# Patient Record
Sex: FEMALE | Race: WHITE
[De-identification: names, ages, dates, MRNs, and addresses within clinical notes are randomized per-mention and may not be internally consistent; named-entity substitution may affect disease eponyms.]

---

## 2017-04-21 ENCOUNTER — HOSPITAL ENCOUNTER (INPATIENT)
Dept: HOSPITAL 68 - ERH | Age: 67
LOS: 3 days | DRG: 812 | End: 2017-04-24
Attending: HOSPITALIST | Admitting: INTERNAL MEDICINE
Payer: COMMERCIAL

## 2017-04-21 VITALS — WEIGHT: 108 LBS | BODY MASS INDEX: 21.2 KG/M2 | HEIGHT: 60 IN

## 2017-04-21 VITALS — DIASTOLIC BLOOD PRESSURE: 62 MMHG | SYSTOLIC BLOOD PRESSURE: 112 MMHG

## 2017-04-21 DIAGNOSIS — Z87.891: ICD-10-CM

## 2017-04-21 DIAGNOSIS — F41.9: ICD-10-CM

## 2017-04-21 DIAGNOSIS — I48.0: ICD-10-CM

## 2017-04-21 DIAGNOSIS — Z85.118: ICD-10-CM

## 2017-04-21 DIAGNOSIS — D64.9: Primary | ICD-10-CM

## 2017-04-21 DIAGNOSIS — E87.5: ICD-10-CM

## 2017-04-21 DIAGNOSIS — I24.8: ICD-10-CM

## 2017-04-21 DIAGNOSIS — E78.5: ICD-10-CM

## 2017-04-21 DIAGNOSIS — N18.4: ICD-10-CM

## 2017-04-21 DIAGNOSIS — K92.2: ICD-10-CM

## 2017-04-21 DIAGNOSIS — I25.10: ICD-10-CM

## 2017-04-21 DIAGNOSIS — I12.9: ICD-10-CM

## 2017-04-21 DIAGNOSIS — I25.2: ICD-10-CM

## 2017-04-21 DIAGNOSIS — J44.9: ICD-10-CM

## 2017-04-21 LAB
ABSOLUTE GRANULOCYTE CT: 6.2 /CUMM (ref 1.4–6.5)
ABSOLUTE GRANULOCYTE CT: 7.3 /CUMM (ref 1.4–6.5)
BASOPHILS # BLD: 0 /CUMM (ref 0–0.2)
BASOPHILS # BLD: 0 /CUMM (ref 0–0.2)
BASOPHILS NFR BLD: 0 % (ref 0–2)
BASOPHILS NFR BLD: 0 % (ref 0–2)
EOSINOPHIL # BLD: 0.2 /CUMM (ref 0–0.7)
EOSINOPHIL # BLD: 0.3 /CUMM (ref 0–0.7)
EOSINOPHIL NFR BLD: 2.4 % (ref 0–5)
EOSINOPHIL NFR BLD: 3.9 % (ref 0–5)
ERYTHROCYTE [DISTWIDTH] IN BLOOD BY AUTOMATED COUNT: 21.5 % (ref 11.5–14.5)
ERYTHROCYTE [DISTWIDTH] IN BLOOD BY AUTOMATED COUNT: 26.4 % (ref 11.5–14.5)
GRANULOCYTES NFR BLD: 81.1 % (ref 42.2–75.2)
GRANULOCYTES NFR BLD: 86.7 % (ref 42.2–75.2)
HCT VFR BLD CALC: 22 % (ref 37–47)
HCT VFR BLD CALC: 32.5 % (ref 37–47)
LYMPHOCYTES # BLD: 0.4 /CUMM (ref 1.2–3.4)
LYMPHOCYTES # BLD: 0.4 /CUMM (ref 1.2–3.4)
MCH RBC QN AUTO: 29.8 PG (ref 27–31)
MCH RBC QN AUTO: 30.4 PG (ref 27–31)
MCHC RBC AUTO-ENTMCNC: 30.7 G/DL (ref 33–37)
MCHC RBC AUTO-ENTMCNC: 32.5 G/DL (ref 33–37)
MCV RBC AUTO: 93.4 FL (ref 81–99)
MCV RBC AUTO: 97 FL (ref 81–99)
MONOCYTES # BLD: 0.5 /CUMM (ref 0.1–0.6)
MONOCYTES # BLD: 0.7 /CUMM (ref 0.1–0.6)
PLATELET # BLD: 217 /CUMM (ref 130–400)
PLATELET # BLD: 242 /CUMM (ref 130–400)
PMV BLD AUTO: 7.7 FL (ref 7.4–10.4)
PMV BLD AUTO: 8.5 FL (ref 7.4–10.4)
RED BLOOD CELL CT: 2.27 /CUMM (ref 4.2–5.4)
RED BLOOD CELL CT: 3.48 /CUMM (ref 4.2–5.4)
WBC # BLD AUTO: 7.6 /CUMM (ref 4.8–10.8)
WBC # BLD AUTO: 8.5 /CUMM (ref 4.8–10.8)

## 2017-04-21 PROCEDURE — P9016 RBC LEUKOCYTES REDUCED: HCPCS

## 2017-04-21 PROCEDURE — 30233N1 TRANSFUSION OF NONAUTOLOGOUS RED BLOOD CELLS INTO PERIPHERAL VEIN, PERCUTANEOUS APPROACH: ICD-10-PCS | Performed by: INTERNAL MEDICINE

## 2017-04-21 NOTE — CONS- CARDIOLOGY
General Information and HPI
 
Consulting Request
Date of Consult: 04/21/17
Requested By:
YAKELIN MARC MD
 
Reason for Consult:
Coronary artery disease
Source of Information: patient, old records
History of Present Illness:
 
This is a pleasant 67 y/o female with a past medical history of CAD with prior 
PCI (2001/2014) and STEMI tx with BEN to the RCA 4/2016, Hx COPD/ILD/lung cancer
s/p gamma knife radiation, PAF not on AC due to hx of GI bleed and anemia, CKD, 
and hypertension who presents to  with a chief complaint of 
progressive shortness of breath, mostly on exertion over the last week.  She 
also developed an episode of midsternal chest pain yesterday with some radiation
to her left arm.  No obvious reproducible component. Denies any orthopnea, 
paroxysmal nocturnal dyspnea, dizziness, or palpitations. She had been having 
some lower extremity edema previously but resolves when her Norvasc was 
discontinued.  She has not noticed any obvious bleeding.  She was found to be 
severely anemic on presentation and was receiving a transfusion during my 
interview with her.  She is currently without continuing chest pain.
 
 
 
Allergies/Medications
Allergies:
Coded Allergies:
No Known Allergies (05/25/16)
 
Home Med List:
Albuterol Sulfate (Proair Respiclick) 90 MCG AER.POW.BA   2 PUFF INH Q8P PRN 
COPD  (Reported)
Alprazolam 0.5 MG TABLET   1 TAB PO BID ANXIETY  (Reported)
Amiodarone HCl 200 MG TABLET   1 TAB PO DAILY AFIB  (Reported)
Amiodarone HCl (Pacerone) 100 MG TABLET   100 MG PO EOD AFIB  (Reported)
Aspirin (Children's Aspirin) 81 MG TAB.CHEW   1 TAB PO DAILY HEART HEALTH  (
Reported)
Cholecalciferol (Vitamin D3) (Vitamin D) 1,000 UNIT TABLET   3 TAB PO DAILY 
SUPPLEMENT  (Reported)
Cholecalciferol (Vitamin D3) (Vitamin D) 1,000 UNIT TABLET   3,000 UNITS PO 
DAILY SUPPLEMENT  (Reported)
Clopidogrel Bisulfate (Plavix) 75 MG TABLET   1 TAB PO DAILY BLOOD THINNER  (
Reported)
Diphenhydramine HCl (Benadryl) 25 MG CAPSULE   1 CAP PO DAILY AS NEEDED ALLERGY 
(Reported)
Ezetimibe (Zetia) 10 MG TAB   1 TAB PO DAILY CHOL  (Reported)
Ezetimibe (Zetia) 10 MG TABLET   1 TAB PO DAILY CHOLESTEROL  (Reported)
Fluticasone/Vilanterol (Breo Ellipta 100-25 Mcg INH) 100 MCG-25 MCG/DOSE 
BLST.W.DEV   1 PUFF INH DAILY COPD  (Reported)
Levothyroxine Sodium (Levo-T) 25 MCG TABLET   25 MCG PO DAILY THYROID  (Reported
)
Megestrol Acetate (Megace) 400 MG/10 ML (40 MG/ML) ORAL.SUSP   20 ML PO DAILY 
apetite
Metoprolol Succ XL (Toprol Xl) 50 MG TAB.ER.24H   1 TAB PO DAILY AFIB  (Reported
)
Mirtazapine 15 MG TABLET   0.5 TAB PO QPM SLEEP  (Reported)
Mometasone/Formoterol (Dulera 200 Mcg/5 Mcg Inhaler) 13 GM HFA.AER.AD   2 PUF 
INH BID COPD  (Reported)
Omeprazole 40 MG CAPSULE.DR   1 CAP PO DAILY gerd  (Reported)
Ranolazine (Ranexa 500MG) 500 MG TAB.ER.12H   1 TAB PO BID ANGINA  (Reported)
Rosuvastatin Calcium (Crestor) 10 MG TABLET   1 TAB PO DAILY CHOL  (Reported)
Sennosides/Docusate Sodium (Senna-Docusate Sodium Tablet) 1 EACH TABLET   1 TAB 
PO BID PRN CONSTIPATION  (Reported)
Tiotropium Bromide (Spiriva) 18 MCG CAP.W.DEV   1 CAP INH DAILY COPD  (Reported)
Umeclidinium Bromide (Incruse Ellipta) 62.5 MCG/ACTUATION BLST.W.DEV   1 PUFF 
INH DAILY COPD  (Reported)
 
Current Medications:
 Current Medications
 
 
  Sig/Enrique Start time  Last
 
Medication Dose Route Stop Time Status Admin
 
Acetaminophen 650 MG Q6-PRN PRN 04/21 0900 AC 
 
  PO   
 
Albuterol Sulfate 2 PUF Q4 04/21 1000 DC 
 
  INH   
 
Albuterol Sulfate 2 PUF Q4P PRN 04/21 0930 AC 
 
  INH   
 
Alprazolam 0.5 MG BID 04/21 1000 AC 04/21
 
  PO 04/28 0959  1001
 
Alprazolam 0 .STK-MED ONE 04/21 0956 DC 
 
  PO   
 
Amiodarone HCl 100 MG Q48 04/21 1000 AC 04/21
 
  PO   1001
 
Atorvastatin Calcium 40 MG 1700 04/21 1700 AC 
 
  PO   
 
Budesonide/ 2 PUF BID 04/21 1000 AC 04/21
 
Formoterol Fumarate  INH   1001
 
Calcium Gluconate 0 .STK-MED ONE 04/21 0731 DC 
 
  IV   
 
Calcium Gluconate 1 GM ONCE ONE 04/21 0715 DC 04/21
 
Sodium Chloride 100 ML IV 04/21 0814  0730
 
Ezetimibe 10 MG DAILY 04/21 1000 AC 04/21
 
  PO   1001
 
Fluticasone  2 PUF BID 04/21 1000 CAN 
 
Propionate  INH   
 
Levothyroxine Sodium 0.025 MG DAILY AC 04/21 0915 AC 04/21
 
  PO   1001
 
Metoprolol Succinate 50 MG DAILY 04/21 1000 AC 04/21
 
  PO   1001
 
Mirtazapine 7.5 MG AT BEDTIME 04/21 2200 AC 
 
  PO   
 
Multivitamins 1 TAB DAILY 04/21 1000 AC 04/21
 
  PO   1001
 
Nitroglycerin 0 .STK-MED ONE 04/21 0638 DC 
 
  SL   
 
Nitroglycerin 0.4 MG ONCE ONE 04/21 0615 DC 04/21
 
  SL 04/21 0616  0637
 
Omeprazole 40 MG DAILY AC 04/21 0901 AC 04/21
 
  PO   1001
 
Ranolazine 500 MG BID 04/21 2200 AC 
 
  PO   
 
Senna 187 MG AT BEDTIME AS NEED.. 04/21 0915 AC 
 
  PO   
 
Sodium Chloride 500 ML BOLUS ONE 04/21 0700 DC 04/21
 
  IV 04/21 0759  0700
 
 
 
 
Review of Systems
Review of Systems:
 
Review of systems as per HPI. The remainder of a 10 point review of systems was 
reviewed and was otherwise negative.
 
Past History
 
Travel History
Traveled to [place] past 21 day No
 
Medical History
Neurological: NONE
EENT: NONE
Cardiovascular: CAD, hypertension, hyperlipidemia, myocardial infarction, STENTS
Respiratory: pneumonia, pulmonary fibrosis, LUNG CA ILD
Gastrointestinal: peptic ulcer disease
Hepatic: NONE
Renal: chronic kidney disease
Musculoskeletal: disk herniation
Psychiatric: NONE, anxiety
Endocrine: NONE
Blood Disorders: anemia
Cancer(s): lung cancer
 
Surgical History
Surgical History: STENTS (5) HYSTERECTOMY,LAMINECTOMY
 
Family History
Relations & Conditions If Any:
MOTHER (Leukemia).
FATHER (Heart Attack).
SISTER (ovarian cancer).
BROTHER (Diabetes).
 
 
Psychosocial History
Where Do You Live? Home
Who Do You Live With? self
Services at Home: Nursing, Physical Therapy
Primary Language: English
Smoking Status: Former Smoker
ETOH Use: denies use
 
Functional Ability
ADLs
Independent: dressing, eating, toileting, bathing. 
Ambulation: cane
IADLs
Independent: shopping, housework, finances, food prep, telephone, transportation
, medication admin. 
 
Employment History
Employment: Retired
Profession/Employer 
 
ECHO Results (as available)
Date of last Echo 05/18/16
EF% 60
 
Exam & Diagnostic Data
Vital Signs and I&O
Vital Signs
 
 
Date Time Temp Pulse Resp B/P B/P Pulse O2 O2 Flow FiO2
 
     Mean Ox Delivery Rate 
 
04/21 1101 97.6 78 16      
 
04/21 1039       Nasal 2.0L 
 
       Cannula  
 
04/21 1001 97.3 84 20 133/70     
 
04/21 1001 97.3 84 20 133/70     
 
04/21 0920 97.0 80 20 113/74  92 Nasal 2.0L 
 
       Cannula  
 
04/21 0908 97.3 84 20 133/70  93 Nasal 2.0L 
 
       Cannula  
 
04/21 0716 96.8 77 18 106/55  95 Nasal 2.0L 
 
       Cannula  
 
04/21 0649    89/52     
 
04/21 0638    108/53     
 
04/21 0559      98 Nasal 2.0L 
 
       Cannula  
 
04/21 0555 96.8 82 18 106/50  98 Nasal 2.0L 
 
       Cannula  
 
 
 Intake & Output
 
 
 04/21 1600 04/21 0800 04/21 0000 04/20 1600 04/20 0800 04/20 0000
 
Intake Total  0    
 
Output Total      
 
Balance  0    
 
       
 
Intake, Oral  0    
 
Patient 108 lb 110 lb    
 
Weight      
 
Weight  Estimated    
 
Measurement      
 
Method      
 
 
 
Physical Exam:
 
General: no apparent distress. Alert.
Eyes: No obvious scleral icterus.
HEENT: No jugular venous distention or abnormal jugular venous pulsations.
Cardiovascular: Normal intensity S1/S2.  Regular.
Respiratory: Lungs clear to auscultation bilaterally.
Abdomen: Soft, nontender with no guarding or rebound tenderness.
Musculoskeletal: No clubbing or cyanosis noted, no edema
Skin: Warm
Neurologic: No gross focal deficits noted.
 
 
 
Labs/Stephon Results:
 Laboratory Tests
 
 
 04/21
 
 0627
 
Chemistry 
 
  Sodium (137 - 145 mmol/L) 142
 
  Potassium (3.5 - 5.1 mmol/L) 5.6  H
 
  Chloride (98 - 107 mmol/L) 110  H
 
  Carbon Dioxide (22 - 30 mmol/L) 20  L
 
  Anion Gap (5 - 16) 12
 
  BUN (7 - 17 mg/dL) 39  H
 
  Creatinine (0.5 - 1.0 mg/dL) 1.7  H
 
  Estimated GFR (>60 ml/min) 30  L
 
  BUN/Creatinine Ratio (7 - 25 %) 22.9
 
  Glucose (65 - 99 mg/dL) 98
 
  Calcium (8.4 - 10.2 mg/dL) 9.3
 
  Magnesium (1.6 - 2.3 mg/dL) 1.9
 
  Total Bilirubin (0.2 - 1.3 mg/dL) 0.5
 
  AST (14 - 36 U/L) 29
 
  ALT (9 - 52 U/L) 33
 
  Alkaline Phosphatase (<127 U/L) 184  H
 
  Troponin I (< 0.11 ng/ml) < 0.01
 
  Pro-B-Natriuretic Pept (<125 pg/mL) 2840  H
 
  Total Protein (6.3 - 8.2 g/dL) 6.0  L
 
  Albumin (3.5 - 5.0 g/dL) 3.5
 
  Globulin (1.9 - 4.2 gm/dL) 2.5
 
  Albumin/Globulin Ratio (1.1 - 2.2 %) 1.4
 
  Free T4 (0.78 - 2.44 ng/dL) 1.43
 
  Total T3 (0.97 - 1.69 ng/mL) 1.13
 
  TSH &T3 &Free T4 Intrp (0.270 - 4.20 uIU/mL) 12.400  H
 
Hematology 
 
  CBC w Diff MAN DIFF ORDERED
 
  WBC (4.8 - 10.8 /CUMM) 8.5
 
  RBC (4.20 - 5.40 /CUMM) 2.27  L
 
  Hgb (12.0 - 16.0 G/DL) 6.8 *L
 
  Hct (37 - 47 %) 22.0  L
 
  MCV (81.0 - 99.0 FL) 97.0
 
  MCH (27.0 - 31.0 PG) 29.8
 
  RDW (11.5 - 14.5 %) 26.4  H
 
  Plt Count (130 - 400 /CUMM) 242
 
  MPV (7.4 - 10.4 FL) 7.7
 
  Gran % (42.2 - 75.2 %) 86.7  H
 
  Lymphocytes % (20.5 - 51.1 %) 4.7  L
 
  Monocytes % (1.7 - 9.3 %) 6.2
 
  Eosinophils % (0 - 5 %) 2.4
 
  Basophils % (0.0 - 2.0 %) 0  L
 
  Absolute Granulocytes (1.4 - 6.5 /CUMM) 7.3  H
 
  Segmented Neutrophils (42.2 - 75.2 %) 90  H
 
  Absolute Lymphocytes (1.2 - 3.4 /CUMM) 0.4  L
 
  Lymphocytes (20.5 - 51.1 %) 3  L
 
  Monocytes (1.7 - 9.3 %) 4
 
  Absolute Monocytes (0.10 - 0.60 /CUMM) 0.5
 
  Eosinophils (0 - 5.0 %) 3
 
  Absolute Eosinophils (0.0 - 0.7 /CUMM) 0.2
 
  Absolute Basophils (0.0 - 0.2 /CUMM) 0
 
  Nucleated RBCs (0.0 - 0.0 /100WBC) 1  H
 
  Platelet Estimate (ADEQUATE) ADEQUATE
 
  Polychromasia 1+
 
  Hypochromic-Microcytic 1+
 
  Poikilocytosis 1+
 
  Anisocytosis 1+
 
  Macrocytic Cells FEW
 
  Ovalocytes 1+
 
  PUBS MCHC (33.0 - 37.0 G/DL) 30.7  L
 
Other Body Source 
 
  Fld Total RBCs Counted (%) 100
 
 
 
 
Diagnostic Data
EKG Results
Tracing was personally reviewed and shows sinus rhythm at 81 bpm with poor R-
wave progression and left axis deviation
CXR Results
There is a small left pleural effusion that appears new when compared to the
most recent prior examination from 12/03/2016. Mixed interstitial and
alveolar opacities within the right lower lobe are stable. No overt
consolidative disease.
 
Assessment/Plan
Assessment/Plan
 
1. Severe symptomatic anemia requiring transfusion with concern for GI bleeding
2. CAD with prior PCI (2001/2014) and recent STEMI; tx with BEN to the RCA 4/
2016
3. Hx COPD/ILD/lung cancer s/p gamma knife radiation (felt not a surgical 
candidate)
4. PAF not on AC due to hx of GI bleed and anemia, on low dose Amiodarone
5. CKD
6. Hyperkalemia
 
I agree that her symptoms may have been ischemic in nature likely precipitated 
by demand ischemia in the setting of the new severe anemia. Agree with obtaining
serial troponins. Check a stool guaiac and obtain GI consult. Would hold the 
aspirin and Plavix for now, will need to be back on aspirin therapy in the 
future but unlikely need to restart Plavix as she is now one year status post 
drug-eluting stent. Continue her antianginal regimen and statin therapy. Monitor
the potassium. Continue patient on telemetry. She remains in sinus rhythm.
 
Benjamin Pacheco MD [facility]
 
 
 
 
 
 
Consult Acknowledgment
- Thank you for your consult request.

## 2017-04-21 NOTE — RADIOLOGY REPORT
EXAMINATION:
XR PORTABLE CHEST
 
CLINICAL INFORMATION:
Chest pain.
 
COMPARISON:
Chest radiograph 12/03/2016.
 
TECHNIQUE:
Portable AP view of the chest was obtained.
 
FINDINGS:
Mixed interstitial and airspace disease within the right lower lobe appear
largely unchanged when compared to most recent prior examination from
12/03/2016. There is a stable position of 4 linear metallic bodies within the
right lower hemithorax. There is a new small left effusion with associated
subsegmental atelectasis of the left lower lobe. No pneumothorax. Coarse
interstitial markings are visualized within the upper lobes. The cardiac
silhouette is unremarkable. Upper mediastinal contours are normal. No acute
osseous finding.
 
IMPRESSION:
There is a small left pleural effusion that appears new when compared to the
most recent prior examination from 12/03/2016. Mixed interstitial and
alveolar opacities within the right lower lobe are stable. No overt
consolidative disease.

## 2017-04-21 NOTE — EVENT NOTE
Event Note
Event Note:
I did a rectal examination at 10am with stool guiac, stool is brown and guiac 
positive.

## 2017-04-21 NOTE — HISTORY & PHYSICAL
BIRGIT NEWBERRY,CULLEN 04/21/17 0909:
General Information and HPI
MD Statement:
I have seen and personally examined CHRIS ELDRIDGE and documented this H&P.
 
The patient is a 66 year old F who presented with a patient stated chief 
complaint of chest pain[].
 
Source of Information: patient, old records
Exam Limitations: no limitations
History of Present Illness:
67 YO F with pmh of STEMI 4/2016 with drug eluding stent placement, h/o total 5 
stents, CAD, PAF, not on anticoagulation due to h/o GI bleed, HTN, HLD, Lung Ca 
s/p cyberknife sugery, COPD (not on home O2), PUD, CKD, Chronic anemia (h/o 
transfusions and epogen) presents to the ER from home after developing chest 
pain yesterday while at [employer] walking through the store. Reports centrally 
located cp that radiated to her left arm, neck, and jaw, 7/10. Reports the pain 
she had last year when she had her MI as more intense. This pain subsided after 
10 minutes of sitting in the store. She drove her self home. Through the night 
she continued to have similar episodes with shortness of breath and decided to 
seek help, called 911. Reports that her pain continued until given nitroglycerin
in ER. Since that time she denies any recurring chest pain. Denies palpitations,
lightheadness, nausea, vomiting, abdominal pain, fever or chills. She is 
chronically constipated, last BM yesterday stool well formed w/o blood. States 
that she can go several days w/o bowel movements, this makes it difficult to 
take iron supplementation. Recently started on epogen.
 
Allergies/Medications
Allergies:
Coded Allergies:
No Known Allergies (05/25/16)
 
Home Med list
Albuterol Sulfate (Proair Respiclick) 90 MCG AER.POW.BA   2 PUFF INH Q8P PRN 
COPD  (Reported)
Alprazolam 0.5 MG TABLET   1 TAB PO BID ANXIETY  (Reported)
Amiodarone HCl 200 MG TABLET   1 TAB PO DAILY AFIB  (Reported)
Amiodarone HCl (Pacerone) 100 MG TABLET   100 MG PO EOD AFIB  (Reported)
Aspirin (Children's Aspirin) 81 MG TAB.CHEW   1 TAB PO DAILY HEART HEALTH  (
Reported)
Cholecalciferol (Vitamin D3) (Vitamin D) 1,000 UNIT TABLET   3 TAB PO DAILY 
SUPPLEMENT  (Reported)
Cholecalciferol (Vitamin D3) (Vitamin D) 1,000 UNIT TABLET   3,000 UNITS PO 
DAILY SUPPLEMENT  (Reported)
Clopidogrel Bisulfate (Plavix) 75 MG TABLET   1 TAB PO DAILY BLOOD THINNER  (
Reported)
Diphenhydramine HCl (Benadryl) 25 MG CAPSULE   1 CAP PO DAILY AS NEEDED ALLERGY 
(Reported)
Ezetimibe (Zetia) 10 MG TAB   1 TAB PO DAILY CHOL  (Reported)
Ezetimibe (Zetia) 10 MG TABLET   1 TAB PO DAILY CHOLESTEROL  (Reported)
Fluticasone/Vilanterol (Breo Ellipta 100-25 Mcg INH) 100 MCG-25 MCG/DOSE 
BLST.W.DEV   1 PUFF INH DAILY COPD  (Reported)
Levothyroxine Sodium (Levo-T) 25 MCG TABLET   25 MCG PO DAILY THYROID  (Reported
)
Megestrol Acetate (Megace) 400 MG/10 ML (40 MG/ML) ORAL.SUSP   20 ML PO DAILY 
apetite
Metoprolol Succ XL (Toprol Xl) 50 MG TAB.ER.24H   1 TAB PO DAILY AFIB  (Reported
)
Mirtazapine 15 MG TABLET   0.5 TAB PO QPM SLEEP  (Reported)
Mometasone/Formoterol (Dulera 200 Mcg/5 Mcg Inhaler) 13 GM HFA.AER.AD   2 PUF 
INH BID COPD  (Reported)
Omeprazole 40 MG CAPSULE.DR   1 CAP PO DAILY gerd  (Reported)
Ranolazine (Ranexa 500MG) 500 MG TAB.ER.12H   1 TAB PO BID ANGINA  (Reported)
Rosuvastatin Calcium (Crestor) 10 MG TABLET   1 TAB PO DAILY CHOL  (Reported)
Sennosides/Docusate Sodium (Senna-Docusate Sodium Tablet) 1 EACH TABLET   1 TAB 
PO BID PRN CONSTIPATION  (Reported)
Tiotropium Bromide (Spiriva) 18 MCG CAP.W.DEV   1 CAP INH DAILY COPD  (Reported)
Umeclidinium Bromide (Incruse Ellipta) 62.5 MCG/ACTUATION BLST.W.DEV   1 PUFF 
INH DAILY COPD  (Reported)
 
Compliance With Home Meds: GOOD
 
Past History
 
Travel History
Traveled to [place] past 21 day No
 
Medical History
Neurological: NONE
EENT: NONE
Cardiovascular: CAD, hypertension, hyperlipidemia, myocardial infarction, STENTS
Respiratory: pneumonia, pulmonary fibrosis, LUNG CA ILD
Gastrointestinal: peptic ulcer disease
Hepatic: NONE
Renal: chronic kidney disease
Musculoskeletal: disk herniation
Psychiatric: NONE, anxiety
Endocrine: NONE
Blood Disorders: anemia
Cancer(s): lung cancer
History of MRSA: No
History of VRE: No
History of CDIFF: No
Pneumonia Vaccine: 10/01/16
Influenza Vaccine: 10/01/16
 
Surgical History
Surgical History: STENTS (5) HYSTERECTOMY,LAMINECTOMY
 
ECHO Results (as available)
Date of last Echo 05/18/16
EF% 60
 
Past Family/Social History
 
Family History
Relations & Conditions if any
MOTHER (Leukemia).
FATHER (Heart Attack).
SISTER (ovarian cancer).
BROTHER (Diabetes).
 
 
Psychosocial History
Where do you live? Home
Who Do You Live With? self
Services at Home: Nursing, Physical Therapy
Primary Language: English
Smoking Status: Former Smoker
ETOH Use: denies use
 
Functional Ability
ADLs
Independent: dressing, eating, toileting, bathing. 
Ambulation: cane
IADLs
Independent: shopping, housework, finances, food prep, telephone, transportation
, medication admin. 
 
Employment History
Employment Retired
Profession/Employer 
 
Review of Systems
 
Review of Systems
Constitutional:
Denies: chills, fever, malaise, weakness. 
Cardiovascular:
Reports: chest pain.  Denies: orthopena, palpitations, peripheral edema, 
syncope. 
Respiratory:
Reports: short of breath.  Denies: cough, orthopnea, sputum production, 
wheezing. 
GI:
Reports: constipation.  Denies: abdominal pain, diarrhea, distention, melena, 
nausea, bloody stool, changes in stool, vomiting. 
Genitourinary:
Reports: no symptoms. 
Musculoskeletal:
Reports: no symptoms. 
Skin:
Reports: no symptoms. 
Neurological/Psychological:
Reports: anxiety. 
Hematologic/Endocrine:
Denies: bleeding. 
All Other Systems: Reviewed and Negative
 
Exam & Diagnostic Data
Last 24 Hrs of Vital Signs/I&O
 Vital Signs
 
 
Date Time Temp Pulse Resp B/P B/P Pulse O2 O2 Flow FiO2
 
     Mean Ox Delivery Rate 
 
04/21 0908 97.3 84 20 133/70  93 Nasal 2.0L 
 
       Cannula  
 
04/21 0716 96.8 77 18 106/55  95 Nasal 2.0L 
 
       Cannula  
 
04/21 0649    89/52     
 
04/21 0638    108/53     
 
04/21 0559      98 Nasal 2.0L 
 
       Cannula  
 
04/21 0555 96.8 82 18 106/50  98 Nasal 2.0L 
 
       Cannula  
 
 
 Intake & Output
 
 
 04/21 1600 04/21 0800 04/21 0000
 
Intake Total  0 
 
Output Total   
 
Balance  0 
 
    
 
Intake, Oral  0 
 
Patient  110 lb 
 
Weight   
 
Weight  Estimated 
 
Measurement   
 
Method   
 
 
 
 
Physical Exam
General Appearance Alert, Oriented X3, Cooperative, No Acute Distress
Skin No Rashes
HEENT Atraumatic, PERRLA, EOMI, Mucous Membr. moist/pink
Neck Supple, No JVD
Cardiovascular Regular Rate, Normal S1, Normal S2
Lungs crackles at bases
Abdomen Normal Bowel Sounds, Soft, No Tenderness
Neurological Normal Speech, Strength at 5/5 X4 Ext
Extremities No Edema, Normal Pulses
Last 24 Hrs of Labs/Stephon:
 Laboratory Tests
 
04/21/17 0627:
Anion Gap 12, Estimated GFR 30  L, BUN/Creatinine Ratio 22.9, Glucose 98, 
Calcium 9.3, Magnesium 1.9, Total Bilirubin 0.5, AST 29, ALT 33, Alkaline 
Phosphatase 184  H, Troponin I < 0.01, Pro-B-Natriuretic Pept 2840  H, Total 
Protein 6.0  L, Albumin 3.5, Globulin 2.5, Albumin/Globulin Ratio 1.4, Free T4 
1.43, Total T3 1.13, TSH &T3 &Free T4 Intrp 12.400  H, CBC w Diff MAN DIFF 
ORDERED, RBC 2.27  L, MCV 97.0, MCH 29.8, RDW 26.4  H, MPV 7.7, Gran % 86.7  H, 
Lymphocytes % 4.7  L, Monocytes % 6.2, Eosinophils % 2.4, Basophils % 0  L, 
Absolute Granulocytes 7.3  H, Segmented Neutrophils 90  H, Absolute Lymphocytes 
0.4  L, Lymphocytes 3  L, Monocytes 4, Absolute Monocytes 0.5, Eosinophils 3, 
Absolute Eosinophils 0.2, Absolute Basophils 0, Nucleated RBCs 1  H, Platelet 
Estimate ADEQUATE, Polychromasia 1+, Hypochromic-Microcytic 1+, Poikilocytosis 1
+, Anisocytosis 1+, Macrocytic Cells FEW, Ovalocytes 1+, PUBS MCHC 30.7  L, Fld 
Total RBCs Counted 100
 
 
Diagnostic Data
EKG Results
SR 81, LAFB
CXR Results
There is a small left pleural effusion that appears new when compared to the
most recent prior examination from 12/03/2016. Mixed interstitial and
alveolar opacities within the right lower lobe are stable. No overt
consolidative disease.
 
Assessment/Plan
Assessment:
67 YO F with pmh of STEMI 4/2016 with drug eluding stent placement, h/o total 5 
stents, CAD, PAF, not on anticoagulation due to h/o GI bleed, HTN, HLD, Lung Ca 
s/p cyberknife sugery, COPD (not on home O2), PUD, CKD, Chronic anemia (h/o 
transfusions and epogen) presents to the ER from home after developing chest 
pain that developed yesterday. She is severely anemic on on arrival with ongoing
chest pain and requiring oxygen. Denies blood in her stools.
 
Admit to Telemetry
 
Appears to be anginal pain to to severe anemia
Recommend blood transfusion to keep HH >8/24
We will guiac her stools, for now will hold asprin and plavix
She will need a GI consultation as 4/10/17 HH 10.5/32.6, unlikely this severe 
anemia due to CKD
Discuss with cardiology regarding plavix use, 1 year post stent placement.
Serial troponins and EKGs
B-blocker and  statin have been started
PAF continue amiodarone 100mg, took last dose on 4/19/17
Hyperkalemia, she has been given calcium gluconate in ER, montior K closely. 
will need to treat if K increases further
CKD appears to be stable
COPD, requiring O2 this is lkely due to chronic anemia, monitor oxygen 
saturations closely, cxr also identifies small pleural effusion, keep O2 
saturations > 92%
DVT ppx ALPS
  
 
As Ranked By This Provider
Problem List:
 1. Anxiety
 
 2. Hyperlipidemia
 
 3. COPD (chronic obstructive pulmonary disease)
 
 4. Chest pain
 
 5. CAD (coronary artery disease)
 
 6. PAF (paroxysmal atrial fibrillation)
 
 7. CKD (chronic kidney disease) stage 4, GFR 15-29 ml/min
 
 8. Hyperkalemia
 
 9. Symptomatic anemia
 
 
Core Measures/Miscellaneous
 
Acute Coronary Syndrome
ACS Diagnosis: No
 
Cerebrovascular Accident
CVA/TIA Diagnosis: No
 
Congestive Heart Failure
CHF Diagnosis: No
 
Venous Thromboembolism
VTE Risk Factors: Age > 40, Cancer/chemo/oth therapy
No Mech VTE prophylaxis d/t: No contraindications
No VTE Pharm Prophylaxis d/t: Active bleeding
VTE Diagnosis: No
VTE Type: NONE
VTE Confirmed by (Test): NONE
 
Severe Sepsis
Severe Sepsis Present: No
 
Septic Shock
Septic Shock Present: No
 
Miscellaneous Documentation
Attending Case Discussed With:
YAKELIN MARC MD
 
Primary Care Physician:
YAKELIN MARC MD
 
Patient sees these Specialists
Gastroeneterology
Cardiology
Pulmonology
Level of Patient Care: Telemetry
 
 
BETI STACK MD 04/21/17 0921:
Attending MD Review Statement
 
Attending Statement
Attending MD Statement: examined this patient, discuss w/resident/PA/NP, agreed 
w/resident/PA/NP, reviewed EMR data (avail)
Attending Assessment/Plan:
66-year-old female with history of COPD, interstitial lung disease, history of 
lung cancer status post treatment, extensive coronary artery disease and 
paroxysmal atrial fibrillation not a candidate for anticoagulation secondary to 
GI bleed and history of chronic kidney disease presenting with exertional angina
while she was shopping yesterday.  Lab demonstrates severe anemia.  Suspect slow
GI bleed.  Patient had a similar presentation in 2014 and at that point EGD and 
push enteroscopy was negative and colonoscopy in 2012 was negative as well.  
Chest pain appears angina likely in the setting of severe anemia.  EKG and 
troponin so far negative.  We will admit the patient to telemetry, guaiac stools
and transfuse for hemoglobin goal above 8. 
 
Plan
Guaiac stools
Transfuse for hemoglobin goal above 8
Hold aspirin and Plavix if guaiac stools were positive
Cycle troponins and EKGs
Cardiology and GI consult
correct hyperkalemia
Continue other home medications
Alps for DVT prophylaxis
DNI/DNR

## 2017-04-21 NOTE — NUR
IV CALCIUM GLUCONATE INSFUSING WITHOUT DIFFICULTY AT THIS TIME. PT REMAINS
AWAKE, ALERT, ORIENTED, NO COMPLAINTS AT THIS TIME.

## 2017-04-21 NOTE — NUR
**CRITICAL TEST RESULTS**
 
[id number] CHRIS ELDRIDGE 66 F
 
TESTS AND RESULTS: HGB 6.8 ; HCT 22.0
 
Results received and read back by:   BEENA VALENCIA
   Results received date and time:   04/21/17 0642
 
The following provider was notified of the results, and read the results back:
                       MD SLADE
       
Notified date and time:  04/21/17 at 0642

## 2017-04-21 NOTE — NUR
PT BIBA FROM HOME C/O SUBSTERNAL L SIDED CP
RADIATING TO R SIDE X A FEW HOURS. DENIES SOB,
DENIES DIAPHORESIS. PT HAS HX 5 STENTS, AND AN MI
1 YEAR AGO.

## 2017-04-21 NOTE — NUR
1ST UNIT LPRBC'S INFUSING WITHOUT DIFFICULTY AT THIS TIME. PT RESTING
COMFORTABLY ON STRETCHER AT THIS TIME, NO COMPLAINTS AT PRESENT.

## 2017-04-21 NOTE — ADMISSION CERTIFICATION
Admission Certification
 
Certification Statement
- As attending physician, I certify that at the time of
- admission, based on clinical presentation, severity of
- symptoms, need for further diagnostic testing and
- therapeutic interventions, and risk of adverse outcomes
- without in-hospital treatment, in my clinical assessment,
- this patient requires an acute hospital stay for a minimum
- of two nights or longer. I have also considered psychsocial
- factors such as support system, advanced age, financial
- issues, cognitive issues, and failed out-patient treatments,
- past re-admission history, safety of patient, and lack of
- compliance as applicable.
Specific rationale supporting this admission is:
Symptomatic anemia
Angina

## 2017-04-21 NOTE — NUR
IV CALCIUM COMPLETED, TOLERATED WELL. MST EDDIE TO LAB FOR BLOOD PRODUCT
PICKUP. PT AMBULATORY TO BATHROOM, GAIT STABLE.

## 2017-04-21 NOTE — ED CARDIAC/CP/PALPITATIONS
History of Present Illness
 
General
Chief Complaint: Chest Pain
Stated Complaint: BIBA, CP
Source: patient, old records, EMS
Exam Limitations: no limitations
 
Vital Signs & Intake/Output
Vital Signs & Intake/Output
 Vital Signs
 
 
Date Time Temp Pulse Resp B/P B/P Pulse O2 O2 Flow FiO2
 
     Mean Ox Delivery Rate 
 
04/21 0716 96.8 77 18 106/55  95 Nasal 2.0L 
 
       Cannula  
 
04/21 0649    89/52     
 
04/21 0638    108/53     
 
04/21 0559      98 Nasal 2.0L 
 
       Cannula  
 
04/21 0555 96.8 82 18 106/50  98 Nasal 2.0L 
 
       Cannula  
 
 
Allergies
Coded Allergies:
No Known Allergies (05/25/16)
 
Reconcile Medications
Albuterol Sulfate (Proair Respiclick) 90 MCG AER.POW.BA   2 PUFF INH Q8P PRN 
COPD  (Reported)
Alprazolam 0.5 MG TABLET   1 TAB PO BID ANXIETY  (Reported)
Amiodarone HCl 200 MG TABLET   1 TAB PO DAILY AFIB  (Reported)
Aspirin (Children's Aspirin) 81 MG TAB.CHEW   1 TAB PO DAILY HEART HEALTH  (
Reported)
Cholecalciferol (Vitamin D3) (Vitamin D) 1,000 UNIT TABLET   3 TAB PO DAILY 
SUPPLEMENT  (Reported)
Clopidogrel Bisulfate (Plavix) 75 MG TABLET   1 TAB PO DAILY BLOOD THINNER  (
Reported)
Diphenhydramine HCl (Benadryl) 25 MG CAPSULE   1 CAP PO DAILY AS NEEDED ALLERGY 
(Reported)
Ezetimibe (Zetia) 10 MG TAB   1 TAB PO DAILY CHOL  (Reported)
Megestrol Acetate (Megace) 400 MG/10 ML (40 MG/ML) ORAL.SUSP   20 ML PO DAILY 
apetite
Metoprolol Succ XL (Toprol Xl) 50 MG TAB.ER.24H   1 TAB PO DAILY AFIB  (Reported
)
Mirtazapine 15 MG TABLET   0.5 TAB PO QPM SLEEP  (Reported)
Mometasone/Formoterol (Dulera 200 Mcg/5 Mcg Inhaler) 13 GM HFA.AER.AD   2 PUF 
INH BID COPD  (Reported)
Omeprazole 40 MG CAPSULE.DR   1 CAP PO DAILY gerd  (Reported)
Ranolazine (Ranexa 500MG) 500 MG TAB.ER.12H   1 TAB PO BID ANGINA  (Reported)
Rosuvastatin Calcium (Crestor) 10 MG TABLET   1 TAB PO DAILY CHOL  (Reported)
Sennosides/Docusate Sodium (Senna-Docusate Sodium Tablet) 1 EACH TABLET   1 TAB 
PO BID PRN CONSTIPATION  (Reported)
Tiotropium Bromide (Spiriva) 18 MCG CAP.W.DEV   1 CAP INH DAILY COPD  (Reported)
 
Core Measure Meds Pre-Hospital aspirin
Triage Note:
PT BIBA FROM HOME C/O SUBSTERNAL L SIDED CP
RADIATING TO R SIDE X A FEW HOURS. DENIES SOB,
DENIES DIAPHORESIS. PT HAS HX 5 STENTS, AND AN MI
1 YEAR AGO.
Triage Nurses Notes Reviewed? yes
Onset: 1 day
Duration: hour(s):, constant, continues in ED
Timing: recent history
Quality/Severity: moderate, pressure
Location: substernal
Radiation: shoulders, arms
Activities at Onset: activity
Prior Chest Pain/Card Workup: cardiac cath, echocardiography, heart attack
Modifying Factors:
Improves With: rest.  Worsens With: exercise, movement. 
Nitro Today/Relief: 0.4 mg x 1, provided by ED
Aspirin Today: 81 mg x 2, provided at home
Associated Symptoms: dizziness, fatigue, shortness of breath, weakness
LMP (ages 10-50): post menopausal
Pregnant: No
Patient currently breastfeeds: No
HPI:
1 day prior to admission while walking to the store patient became fatigued with
shortness of breath chest pain described as pressure mild to moderate rating to 
shoulder and arm improved with rest.  Chest pain waxing and waning throughout 
the course of the day and she woke with it with continued fatigue and called 
911.
She denies fever chills nausea vomiting diarrhea abdominal pain headache dysuria
rash bleeding.  She has a chronic productive cough that has not changed.
 
Past History
 
Travel History
Traveled to [place] past 21 day No
 
Medical History
Any Pertinent Medical History? see below for history
Neurological: NONE
EENT: NONE
Cardiovascular: CAD, hypertension, hyperlipidemia, myocardial infarction, STENTS
Respiratory: pneumonia, pulmonary fibrosis, LUNG CA ILD
Gastrointestinal: peptic ulcer disease
Hepatic: NONE
Renal: chronic kidney disease
Musculoskeletal: disk herniation
Psychiatric: NONE, anxiety
Endocrine: NONE
Blood Disorders: anemia
Cancer(s): lung cancer
History of MRSA: No
History of VRE: No
History of CDIFF: No
Pneumonia Vaccine: 10/01/16
Influenza Vaccine: 10/01/16
 
Surgical History
Surgical History: STENTS (2) HYSTERECTOMY,LAMINECTOMY
 
Psychosocial History
Who do you live with Patient/Self
Services at Home Nursing, Physical Therapy
What is your primary language English
Tobacco Use: Never used
ETOH Use: denies use
 
Family History
Family History, If Any:
MOTHER (Leukemia).
FATHER (Heart Attack).
SISTER (ovarian cancer).
BROTHER (Diabetes).
 
Hx Contributory? No
 
Review of Systems
 
Review of Systems
Constitutional:
Reports: see HPI, malaise, weakness. 
EENTM:
Reports: no symptoms. 
Respiratory:
Reports: see HPI, short of breath. 
Cardiovascular:
Reports: see HPI, chest pain, peripheral edema. 
GI:
Reports: no symptoms. 
Genitourinary:
Reports: no symptoms. 
Musculoskeletal:
Reports: no symptoms. 
Skin:
Reports: no symptoms. 
Neurological/Psychological:
Reports: no symptoms. 
Hematologic/Endocrine:
Reports: no symptoms. 
Immunologic/Allergic:
Reports: no symptoms. 
All Other Systems: Reviewed and Negative
 
Physical Exam
 
Physical Exam
General Appearance: well developed/nourished, alert, awake, anxious, moderate 
distress
Head: atraumatic, normal appearance
Eyes:
Bilateral: normal appearance, PERRL, EOMI. 
Ears, Nose, Throat: normal pharynx, normal ENT inspection
Neck: normal inspection, supple, full range of motion, no midline tenderness
Respiratory: chest non-tender, no respiratory distress, quiet respiration, 
decreased breath sounds, rhonchi
Cardiovascular: regular rate/rhythm, normal peripheral pulses, norml femoral 
pulses equa
Peripheral Pulses:
4+ carotid (R), 4+ carotid (L)
Gastrointestinal: normal bowel sounds, soft, non-tender, no organomegaly
Rectal: heme negative stool
Back: normal inspection, normal range of motion
Extremities: normal inspection, normal capillary refill, normal range of motion,
pedal edema
Neurologic/Psych: no motor/sensory deficits, awake, alert, oriented x 3, normal 
mood/affect
Reflexes:
2+: bicep (R), bicep (L). 
Skin: intact, warm/dry, pallor
Lymphatic: no anterior cervical brent
 
Core Measures
ACS in differential dx? Yes
ASA ordered for poss ACS? No-d/t bleeding/risk of
Severe Sepsis Present: No
Septic Shock Present: No
 
Progress
Differential Diagnosis: AMI, CHF/pulm edema, hyperkalemia, pneumonia
Plan of Care:
 Orders
 
 
Procedure Date/time Status
 
Regular Diet 04/21 B Active
 
LEUKOCYTE POOR (PACKED CELLS) 04/21 0716 Active
 
OXYGEN SETUP (GEN) 04/21 0654 Active
 
Saline Lock 04/21 0654 Active
 
Admit to inpatient 04/21 0654 Active
 
Vital Signs 04/21 0654 Active
 
Activity/Ambulation 04/21 0654 Active
 
Code Status 04/21 0654 Active
 
Add-on Test (ER Only) 04/21 0652 Active
 
TYPE & SCREEN (NOT X-MATCH) 04/21 0627 Active
 
TSH REFLEX 04/21 0611 Active
 
TROPONIN LEVEL 04/21 0611 Active
 
MAGNESIUM 04/21 0611 Active
 
COMPREHENSIVE METABOLIC PANEL 04/21 0611 Active
 
CBC WITHOUT DIFFERENTIAL 04/21 0611 Complete
 
B-TYPE NATRIURETIC PEP (BNP) 04/21 0611 Active
 
EKG 04/21 0545 Active
 
 
 Current Medications
 
 
  Sig/Enrique Start time  Last
 
Medication Dose  Stop Time Status Admin
 
Calcium Gluconate 1 GM ONCE ONE 04/21 0715 UNVr 
 
(Calcium Gluconate)   04/21 0814  
 
Sodium Chloride 100 ML    
 
(Normal Saline 0.9%)     
 
Sodium Chloride 500 ML BOLUS ONE 04/21 0700 AC 04/21
 
(Normal Saline 0.9%)   04/21 0759  0700
 
 
 Laboratory Tests
 
 
 
04/21/17 0627:
Anion Gap 12, Estimated GFR 30  L, BUN/Creatinine Ratio 22.9, Glucose 98, 
Calcium 9.3, Magnesium 1.9, Total Bilirubin 0.5, AST 29, ALT 33, Alkaline 
Phosphatase 184  H, Troponin I < 0.01, Pro-B-Natriuretic Pept 2840  H, Total 
Protein 6.0  L, Albumin 3.5, Globulin 2.5, Albumin/Globulin Ratio 1.4, TSH &T3 &
Free T4 Intrp Pending, CBC w Diff MAN DIFF ORDERED, RBC 2.27  L, MCV 97.0, MCH 
29.8, RDW 26.4  H, MPV 7.7, Gran % 86.7  H, Lymphocytes % 4.7  L, Monocytes % 
6.2, Eosinophils % 2.4, Basophils % 0  L, Absolute Granulocytes 7.3  H, 
Segmented Neutrophils 90  H, Absolute Lymphocytes 0.4  L, Lymphocytes 3  L, 
Monocytes 4, Absolute Monocytes 0.5, Eosinophils 3, Absolute Eosinophils 0.2, 
Absolute Basophils 0, Nucleated RBCs 1  H, Platelet Estimate ADEQUATE, 
Polychromasia 1+, Hypochromic-Microcytic 1+, Poikilocytosis 1+, Anisocytosis 1+,
Macrocytic Cells FEW, Ovalocytes 1+, PUBS MCHC 30.7  L, Fld Total RBCs Counted 
100
Diagnostic Imaging:
Viewed by Me: Radiology Read.  Discussed w/RAD: Radiology Read. 
CXR Impression: There is a small left pleural effusion that appears new when 
compared to the most recent prior examination from 12/03/2016. Mixed 
interstitial and alveolar opacities within the right lower lobe are stable. No 
overt consolidative disease.
Initial ED EKG: normal axis, normal intervals, normal p-waves, normal QRS 
complex, normal sinus rhythm, nonspecific ST T wave chg
Prior EKG: unchanged
Rhythm Strip: normal sinus rhythm
 
Departure
 
Departure
Time of Disposition: 0713
Disposition: STILL A PATIENT
Condition: Stable
Clinical Impression
Primary Impression: Symptomatic anemia
Secondary Impressions: Acute hyperkalemia, Chest pain syndrome, Chronic renal 
insufficiency
Referrals:
YAKELIN MARC MD (PCP/Family)
 
Departure Forms:
Customer Survey
General Discharge Information
 
Admission Note
Spoke With:
YAKELIN MARC MD
Documentation of Exam:
Documentation of any treatments & extenuating circumstances including Concerns 
Regarding Discharge (functional status, medication knowledge or non-compliance, 
living conditions, etc.) that warrant an admission rather than observation: 
Cardiac monitoring blood transfusion serial lab exam serial EKG medication 
adjustment cardiology evaluation continuing care discharge planning
 
 
Critical Care Note
 
Critical Care Note
Critical Care Time: 30-74 min (40)

## 2017-04-22 VITALS — SYSTOLIC BLOOD PRESSURE: 90 MMHG | DIASTOLIC BLOOD PRESSURE: 40 MMHG

## 2017-04-22 VITALS — SYSTOLIC BLOOD PRESSURE: 120 MMHG | DIASTOLIC BLOOD PRESSURE: 60 MMHG

## 2017-04-22 VITALS — DIASTOLIC BLOOD PRESSURE: 60 MMHG | SYSTOLIC BLOOD PRESSURE: 110 MMHG

## 2017-04-22 LAB
ABSOLUTE GRANULOCYTE CT: 7.8 /CUMM (ref 1.4–6.5)
BASOPHILS # BLD: 0 /CUMM (ref 0–0.2)
BASOPHILS NFR BLD: 0 % (ref 0–2)
EOSINOPHIL # BLD: 0.2 /CUMM (ref 0–0.7)
EOSINOPHIL NFR BLD: 2.2 % (ref 0–5)
ERYTHROCYTE [DISTWIDTH] IN BLOOD BY AUTOMATED COUNT: 21.3 % (ref 11.5–14.5)
GRANULOCYTES NFR BLD: 83.5 % (ref 42.2–75.2)
HCT VFR BLD CALC: 31 % (ref 37–47)
LYMPHOCYTES # BLD: 0.6 /CUMM (ref 1.2–3.4)
MCH RBC QN AUTO: 30 PG (ref 27–31)
MCHC RBC AUTO-ENTMCNC: 32.2 G/DL (ref 33–37)
MCV RBC AUTO: 93.2 FL (ref 81–99)
MONOCYTES # BLD: 0.7 /CUMM (ref 0.1–0.6)
PLATELET # BLD: 218 /CUMM (ref 130–400)
PMV BLD AUTO: 8.3 FL (ref 7.4–10.4)
RED BLOOD CELL CT: 3.32 /CUMM (ref 4.2–5.4)
WBC # BLD AUTO: 9.3 /CUMM (ref 4.8–10.8)

## 2017-04-22 NOTE — PN- HOUSESTAFF
Subjective
Follow-up For:
chest pain
Complaints: no complaints
Tele-Events Since Last Visit:
nsr, hr 84-97, no event
Subjective:
patient followed up today, she offers no complaints, vital signs have been 
stable, no overnight event
 
received two units of prbc yesterday
 
Review of Systems
Constitutional:
Reports: no symptoms. 
 
Objective
Last 24 Hrs of Vital Signs/I&O
 Vital Signs
 
 
Date Time Temp Pulse Resp B/P B/P Pulse O2 O2 Flow FiO2
 
     Mean Ox Delivery Rate 
 
 2131  88  108/50     
 
 1637 98.9 85 18 90/40  92 Nasal 2.5L 
 
       Cannula  
 
 0932  83  110/60     
 
 0932  83  110/60     
 
 0800      95 Nasal 2.0L 
 
       Cannula  
 
 0730 98.9 83 16 110/60  91 Nasal 2.5L 
 
       Cannula  
 
 0019 99.5 88 20 120/60  91 Nasal 2.5L 
 
       Cannula  
 
 0000      91 Nasal 2.5L 
 
       Cannula  
 
 
 Intake & Output
 
 
  1600  0800  0000
 
Intake Total 850 0 120
 
Output Total 700 300 2
 
Balance 150 -300 118
 
    
 
Intake, IV   0
 
Intake, Oral 850 0 120
 
Number 0 0 0
 
Bowel   
 
Movements   
 
Output, Urine 700 300 2
 
 
 
 
Physical Exam
General Appearance: Alert, Oriented X3, Cooperative, No Acute Distress
Other Physical Findings:
Skin No Rashes
HEENT Atraumatic, PERRLA, EOMI, Mucous Membr. moist/pink
Neck Supple, No JVD
Cardiovascular Regular Rate, Normal S1, Normal S2
Lungs crackles at bases
Abdomen Normal Bowel Sounds, Soft, No Tenderness
Neurological Normal Speech, Strength at 5/5 X4 Ext
Extremities No Edema, Normal Pulses
 
Current Medications:
 Current Medications
 
 
  Sig/Enrique Start time  Last
 
Medication Dose Route Stop Time Status Admin
 
Acetaminophen 650 MG Q6-PRN PRN  0900 AC 
 
  PO   
 
Albuterol Sulfate 2 PUF Q4P PRN  0930 AC 
 
  INH   
 
Alprazolam 0.5 MG BID  1000 AC 
 
  PO  0959  2130
 
Amiodarone HCl 100 MG Q48  1000 AC 
 
  PO   1001
 
Aspirin 81 MG DAILY  1521 AC 
 
  PO   1655
 
Atorvastatin Calcium 40 MG 1700  1700 AC 
 
  PO   1655
 
Budesonide/ 2 PUF BID  1000 AC 
 
Formoterol Fumarate  INH   2131
 
Diphenhydramine HCl 50 MG ONCE ONE 2030 DC 
 
  PO 2031  213
 
Diphenhydramine HCl 50 MG ONCE ONE  2315 DC 
 
  PO 2316  2303
 
Diphenhydramine HCl 50 MG ONCE ONE  2245 DC 
 
  PO  2246  
 
Ezetimibe 10 MG DAILY  1000 AC 
 
  PO   0932
 
Levothyroxine Sodium 0.025 MG DAILY AC  0915 AC 
 
  PO   0614
 
Metoprolol Succinate 50 MG DAILY  1000 AC 
 
  PO   0932
 
Mirtazapine 7.5 MG AT BEDTIME  2200 AC 
 
  PO   2131
 
Multivitamins 1 TAB DAILY  1000 AC 
 
  PO   0932
 
Omeprazole 40 MG 1/2H B/BREAKF/DINNER  1630 AC 
 
  PO   1655
 
Omeprazole 40 MG DAILY AC  0901 DC 
 
  PO   0614
 
Patient Medication  1 UNIT ONE NR  1530 NC 
 
Teaching  ED  1600  
 
Ranolazine 500 MG BID  2200 AC 
 
  PO   2131
 
Senna 187 MG AT BEDTIME AS NEED..  0915 AC 
 
  PO   
 
 
 
 
Last 24 Hrs of Lab/Stephon Results
Last 24 Hrs of Labs/Mics:
 Laboratory Tests
 
17 1552:
Anion Gap 14, Estimated GFR 35  L, BUN/Creatinine Ratio 19.3, CBC w Diff NO MAN 
DIFF REQ, RBC 3.32  L, MCV 93.2, MCH 30.0, RDW 21.3  H, MPV 8.3, Gran % 83.5  H,
Lymphocytes % 6.5  L, Monocytes % 7.8, Eosinophils % 2.2, Basophils % 0  L, 
Absolute Granulocytes 7.8  H, Absolute Lymphocytes 0.6  L, Absolute Monocytes 
0.7  H, Absolute Eosinophils 0.2, Absolute Basophils 0, PUBS MCHC 32.2  L
 
 
Assessment/Plan
Assessment:
67 YO F with pmh of STEMI 2016 with drug eluding stent placement, h/o total 5 
stents, CAD, PAF, not on anticoagulation due to h/o GI bleed, HTN, HLD, Lung Ca 
s/p cyberknife sugery, COPD (not on home O2), PUD, CKD, Chronic anemia (h/o 
transfusions and epogen) presents to the ER from home after developing chest 
pain that developed one day POA.
 
Currently being managed in the telemetry floor for the following issues:
 
#Angina, 2/2 anemia
Given her cardiac history with hb 6.8, angia is best explained due to anemia
Guiac was positive yesterday, awaiting GI consultation, will hold ASA and plavix
for now.
Per cardiology, does not need to be on dual antiplatelet agent even after this 
episode, only ASA would be sufficient.
received two units of PRBC yesterday, repeat hb was 10.6 yesterday evening
no symptoms currently
will follow cbc tomorrow as well
 
#PAF not on AC due to risk of bleeding
 
#h/o CKD
 
#hyperkalemia
potassium was 5.6 yesterday, wasn't followed so have sent BEP. awaiting results,
might need keyaxate or other agents if still high, in the setting of cardiac 
history
 
#h/o COPD/lung cancer
not on exacerbation
trc with the goal to maintain SpO2 >90%
continue O2
 
Diet-  heart healthy
DVT ppx- ALPS only
Code status- DNR/DNI
Problem List:
 1. Chest pain syndrome
 
 2. Symptomatic anemia
 
 3. Hyperkalemia
 
 4. PAF (paroxysmal atrial fibrillation)
 
 5. CAD (coronary artery disease)
 
 6. Chronic renal insufficiency
 
 7. COPD (chronic obstructive pulmonary disease)
 
 8. History of lung cancer
 
Pain Ratin
Pain Location:
-
Pain Goal: Pain 4 or less
Pain Plan:
prn as ordered
Tomorrow's Labs & Rationales:
CBC, BEP
 
CBC, BEP

## 2017-04-22 NOTE — PN- ATT ADDEND
Attending Addendum
Attending Brief Note
Covering attending note.
Patient is feeling a lot better since received 2 units of packed red blood 
cells.  Denies any chest pain or any shortness of breath.
 
 Intake & Output
 
 
 04/22 1600 04/22 0800 04/22 0000
 
Intake Total  0 120
 
Output Total  300 2
 
Balance  -300 118
 
    
 
Intake, IV   0
 
Intake, Oral  0 120
 
Number  0 0
 
Bowel   
 
Movements   
 
Output, Urine  300 2
 
 
 Vital Signs
 
 
Date Time Temp Pulse Resp B/P B/P Pulse O2 O2 Flow FiO2
 
     Mean Ox Delivery Rate 
 
04/22 0730 98.9 83 16 110/60  91 Nasal 2.5L 
 
       Cannula  
 
04/22 0019 99.5 88 20 120/60  91 Nasal 2.5L 
 
       Cannula  
 
04/22 0000      91 Nasal 2.5L 
 
       Cannula  
 
04/21 2228  84  112/60     
 
04/21 1550 98.3 80 18 112/62  90 Nasal 2.5L 
 
       Cannula  
 
04/21 1101 97.6 78 16      
 
04/21 1039       Nasal 2.0L 
 
       Cannula  
 
04/21 1001 97.3 84 20 133/70     
 
04/21 1001 97.3 84 20 133/70     
 
04/21 0920 97.0 80 20 113/74  92 Nasal 2.0L 
 
       Cannula  
 
04/21 0908 97.3 84 20 133/70  93 Nasal 2.0L 
 
       Cannula  
 
 
 Intake & Output
 
 
 04/22 1600 04/22 0800 04/22 0000
 
Intake Total  0 120
 
Output Total  300 2
 
Balance  -300 118
 
    
 
Intake, IV   0
 
Intake, Oral  0 120
 
Number  0 0
 
Bowel   
 
Movements   
 
Output, Urine  300 2
 
 
On examination
Patient is awake alert oriented 3.
Conjunctivae is pale sclera is anicteric
Mucous membrane is dry
Neck is supple
S1-S2 is normal
Lungs air entry equal bilaterally no crepitations or rhonchi
Abdomen is soft nontender bowel sounds are present.
 
 
Stool is OB positive. Laboratory Tests
 
 
 04/21 04/21
 
 1856 1345
 
Chemistry  
 
  Iron (37 - 170 ug/dL)  57
 
  TIBC (265 - 497 ug/dL)  411
 
  Ferritin (11.1 - 264 ng/mL)  41.4
 
  Troponin I (< 0.11 ng/ml) 0.02 0.02
 
Hematology  
 
  CBC w Diff NO MAN DIFF REQ 
 
  WBC (4.8 - 10.8 /CUMM) 7.6 
 
  RBC (4.20 - 5.40 /CUMM) 3.48  L 
 
  Hgb (12.0 - 16.0 G/DL) 10.6  L 
 
  Hct (37 - 47 %) 32.5  L 
 
  MCV (81.0 - 99.0 FL) 93.4 
 
  MCH (27.0 - 31.0 PG) 30.4 
 
  RDW (11.5 - 14.5 %) 21.5  H 
 
  Plt Count (130 - 400 /CUMM) 217 
 
  MPV (7.4 - 10.4 FL) 8.5 
 
  Gran % (42.2 - 75.2 %) 81.1  H 
 
  Lymphocytes % (20.5 - 51.1 %) 5.5  L 
 
  Monocytes % (1.7 - 9.3 %) 9.5  H 
 
  Eosinophils % (0 - 5 %) 3.9 
 
  Basophils % (0.0 - 2.0 %) 0  L 
 
  Absolute Granulocytes (1.4 - 6.5 /CUMM) 6.2 
 
  Absolute Lymphocytes (1.2 - 3.4 /CUMM) 0.4  L 
 
  Absolute Monocytes (0.10 - 0.60 /CUMM) 0.7  H 
 
  Absolute Eosinophils (0.0 - 0.7 /CUMM) 0.3 
 
  Absolute Basophils (0.0 - 0.2 /CUMM) 0 
 
  PUBS MCHC (33.0 - 37.0 G/DL) 32.5  L 
 
 
Stool is OB positive
 
Assessment.
1. Severe symptomatic anemia requiring transfusion with concern for GI bleeding
2. CAD with prior PCI (2001/2014) and recent STEMI; tx with BEN to the RCA 4/
2016
3. Hx COPD/ILD/lung cancer s/p gamma knife radiation (felt not a surgical 
candidate)
4. PAF not on AC due to hx of GI bleed and anemia, on low dose Amiodarone
5. CKD
6. Hyperkalemia
 
Plan is continue with current management get GI consult possibilities endoscopy.

## 2017-04-22 NOTE — PN- CARDIOLOGY
Subjective
Subjective:
The patient is awake, alert
Overall feels improved
The events of the last 24 hours as well as telemetry were reviewed.
 
Review of Systems:
The review of systems is negative for chest pains, palpitations nor 
lightheadedness.
The remainder of the 14 point review of systems is noncontributory with the 
exception of above.
 
Objective
Vital Signs and I&Os
Vital Signs
 
 
Date Time Temp Pulse Resp B/P B/P Pulse O2 O2 Flow FiO2
 
     Mean Ox Delivery Rate 
 
04/22 0932  83  110/60     
 
04/22 0932  83  110/60     
 
04/22 0730 98.9 83 16 110/60  91 Nasal 2.5L 
 
       Cannula  
 
04/22 0019 99.5 88 20 120/60  91 Nasal 2.5L 
 
       Cannula  
 
04/22 0000      91 Nasal 2.5L 
 
       Cannula  
 
04/21 2228  84  112/60     
 
04/21 1550 98.3 80 18 112/62  90 Nasal 2.5L 
 
       Cannula  
 
 
 Intake & Output
 
 
 04/22 1600 04/22 0800 04/22 0000 04/21 1600 04/21 0800 04/21 0000
 
Intake Total  0 120 400 0 
 
Output Total  300 2 300  
 
Balance  -300 118 100 0 
 
       
 
Intake, IV   0   
 
Intake, Oral  0 120 400 0 
 
Number  0 0   
 
Bowel      
 
Movements      
 
Output, Urine  300 2 300  
 
Patient    108 lb 110 lb 
 
Weight      
 
Weight     Estimated 
 
Measurement      
 
Method      
 
 
 
Physical Exam:
General: Nontoxic, no apparent distress.
HEENT: Sclera and conjunctiva within normal limits, without xanthelasmas.
Neck: Carotids 2+ without bruits.
Respiratory: Clear to auscultation, air movement is good, without accessory 
respiratory muscle use.
Heart: Regular rate and rhythm, without murmurs, without JVD.
Abdomen: Soft, nontender, no masses, normoactive bowel sounds.
Extremities: Without clubbing, cyanosis, without edema.
Neuro: Nonfocal exam, strength, 5 out of 5
Skin: Within normal limits without lesions.
Psych: Mood and affect: Normal
Current Medications:
 Current Medications
 
 
  Sig/Enrique Start time  Last
 
Medication Dose Route Stop Time Status Admin
 
Acetaminophen 650 MG Q6-PRN PRN 04/21 0900 AC 
 
  PO   
 
Albuterol Sulfate 2 PUF Q4P PRN 04/21 0930 AC 
 
  INH   
 
Alprazolam 0.5 MG BID 04/21 1000 AC 04/22
 
  PO 04/28 0959  0932
 
Amiodarone HCl 100 MG Q48 04/21 1000 AC 04/21
 
  PO   1001
 
Atorvastatin Calcium 40 MG 1700 04/21 1700 AC 04/21
 
  PO   1649
 
Budesonide/ 2 PUF BID 04/21 1000 AC 04/22
 
Formoterol Fumarate  INH   0932
 
Diphenhydramine HCl 50 MG ONCE ONE 04/21 2315 DC 04/21
 
  PO 04/21 2316  2303
 
Diphenhydramine HCl 50 MG ONCE ONE 04/21 2245 DC 
 
  PO 04/21 2246  
 
Ezetimibe 10 MG DAILY 04/21 1000 AC 04/22
 
  PO   0932
 
Levothyroxine Sodium 0.025 MG DAILY AC 04/21 0915 AC 04/22
 
  PO   0614
 
Metoprolol Succinate 50 MG DAILY 04/21 1000 AC 04/22
 
  PO   0932
 
Mirtazapine 7.5 MG AT BEDTIME 04/21 2200 AC 04/21
 
  PO   2228
 
Multivitamins 1 TAB DAILY 04/21 1000 AC 04/22
 
  PO   0932
 
Omeprazole 40 MG DAILY AC 04/21 0901 AC 04/22
 
  PO   0614
 
Ranolazine 500 MG BID 04/21 2200 AC 04/22
 
  PO   0932
 
Senna 187 MG AT BEDTIME AS NEED.. 04/21 0915 AC 
 
  PO   
 
 
 
 
Results
Last 48 Hrs of Labs/Mics:
 Laboratory Tests
 
04/21/17 1856:
Troponin I 0.02, CBC w Diff NO MAN DIFF REQ, RBC 3.48  L, MCV 93.4, MCH 30.4, 
RDW 21.5  H, MPV 8.5, Gran % 81.1  H, Lymphocytes % 5.5  L, Monocytes % 9.5  H, 
Eosinophils % 3.9, Basophils % 0  L, Absolute Granulocytes 6.2, Absolute 
Lymphocytes 0.4  L, Absolute Monocytes 0.7  H, Absolute Eosinophils 0.3, 
Absolute Basophils 0, PUBS MCHC 32.5  L
 
04/21/17 1345:
Iron 57, TIBC 411, Ferritin 41.4, Troponin I 0.02
 
04/21/17 0627:
Anion Gap 12, Estimated GFR 30  L, BUN/Creatinine Ratio 22.9, Glucose 98, 
Calcium 9.3, Magnesium 1.9, Total Bilirubin 0.5, AST 29, ALT 33, Alkaline 
Phosphatase 184  H, Troponin I < 0.01, Pro-B-Natriuretic Pept 2840  H, Total 
Protein 6.0  L, Albumin 3.5, Globulin 2.5, Albumin/Globulin Ratio 1.4, Free T4 
1.43, Total T3 1.13, TSH &T3 &Free T4 Intrp 12.400  H, CBC w Diff MAN DIFF 
ORDERED, RBC 2.27  L, MCV 97.0, MCH 29.8, RDW 26.4  H, MPV 7.7, Gran % 86.7  H, 
Lymphocytes % 4.7  L, Monocytes % 6.2, Eosinophils % 2.4, Basophils % 0  L, 
Absolute Granulocytes 7.3  H, Segmented Neutrophils 90  H, Absolute Lymphocytes 
0.4  L, Lymphocytes 3  L, Monocytes 4, Absolute Monocytes 0.5, Eosinophils 3, 
Absolute Eosinophils 0.2, Absolute Basophils 0, Nucleated RBCs 1  H, Platelet 
Estimate ADEQUATE, Polychromasia 1+, Hypochromic-Microcytic 1+, Poikilocytosis 1
+, Anisocytosis 1+, Macrocytic Cells FEW, Ovalocytes 1+, PUBS MCHC 30.7  L, Fld 
Total RBCs Counted 100
 
 
Assessment/Plan
Assessment/Plan
1. Severe symptomatic anemia requiring transfusion with concern for GI bleeding
2. CAD with prior PCI (2001/2014) and recent STEMI; tx with BEN to the RCA 4/
2016
3. Hx COPD/ILD/lung cancer s/p gamma knife radiation (felt not a surgical 
candidate)
4. PAF not on AC due to hx of GI bleed and anemia, on low dose Amiodarone
5. CKD
6. Hyperkalemia
 
Guaiac positive testing last night was noted.
 
We will continue the patient off aspirin as well as dual antiplatelet therapy.  
Given the timing of her prior stent, she will not need to resume dual 
antiplatelet therapy; however, if deemed aspirin should be restarted when 
approved by GI.
 
Continue the remainder the current medication regimen
Continue telemetry? Yes

## 2017-04-22 NOTE — CONS- GASTROENTEROLOGY
General Information and HPI
 
Consulting Request
Date of Consult: 04/22/17
Requested By:
YAKELIN MARC MD
 
Reason for Consult:
Iron deficiency anemia
Occult GI bleeding
Source of Information: patient, old records
History of Present Illness:
67-year-old female last seen in my office on February 23 with history of iron 
deficiency anemia, intestinal angiodysplasias, remote peptic ulcer disease, 
colonic adenoma, and constipation requiring MiraLAX.
 
The patient presents with chest pain, dyspnea, weakness, vague indigestion, and 
profound anemia.
 
Last colonoscopy was in 2012, for a history of adenomas, and revealed 
diverticulosis but no polyps or bleeding sites.  Enteroscopy was performed in 
May 2013 demonstrating 2 jejunal angiodysplasias.  She was last seen in the 
hospital in March 2014 with anemia and Hemoccult-positive stool.  She had an EGD
with enteroscopy which demonstrated hiatal hernia but no bleeding site. In 
December 2016 she was hospitalized and received 1 unit of packed red blood cells
, and GI was not consulted at that time.  The patient has been receiving iron 
infusions as directed by nephrology.  She has constipation as above, but no 
nausea, diarrhea, dysphagia, vomiting, and only rare heartburn.
 
She had a myocardial infarction in April.  Other medical history includes lung 
cancer, COPD, CAD with stents, on antiplatelet therapy, interstitial lung 
disease, atrial fibrillation, chronic renal disease. 
Social history: negative for alcohol use or illicit drug use; former smoker.  
Family history: Leukemia, ovarian cancer, diabetes mellitus; no GI malignancy.
 
Allergies/Medications
Allergies:
Coded Allergies:
No Known Allergies (05/25/16)
 
Home Med List:
Albuterol Sulfate (Proair Respiclick) 90 MCG AER.POW.BA   2 PUFF INH Q8P PRN 
COPD  (Reported)
Alprazolam 0.5 MG TABLET   1 TAB PO BID ANXIETY  (Reported)
Amiodarone HCl 200 MG TABLET   1 TAB PO DAILY AFIB  (Reported)
Amiodarone HCl (Pacerone) 100 MG TABLET   100 MG PO EOD AFIB  (Reported)
Aspirin (Children's Aspirin) 81 MG TAB.CHEW   1 TAB PO DAILY HEART HEALTH  (
Reported)
Cholecalciferol (Vitamin D3) (Vitamin D) 1,000 UNIT TABLET   3 TAB PO DAILY 
SUPPLEMENT  (Reported)
Cholecalciferol (Vitamin D3) (Vitamin D) 1,000 UNIT TABLET   3,000 UNITS PO 
DAILY SUPPLEMENT  (Reported)
Diphenhydramine HCl (Benadryl) 25 MG CAPSULE   1 CAP PO DAILY AS NEEDED ALLERGY 
(Reported)
Ezetimibe (Zetia) 10 MG TABLET   1 TAB PO DAILY CHOLESTEROL  (Reported)
Ezetimibe (Zetia) 10 MG TAB   1 TAB PO DAILY CHOL  (Reported)
Fluticasone/Vilanterol (Breo Ellipta 100-25 Mcg INH) 100 MCG-25 MCG/DOSE 
BLST.W.DEV   1 PUFF INH DAILY COPD  (Reported)
Levothyroxine Sodium (Levo-T) 25 MCG TABLET   25 MCG PO DAILY THYROID  (Reported
)
Megestrol Acetate (Megace) 400 MG/10 ML (40 MG/ML) ORAL.SUSP   20 ML PO DAILY 
apetite
Metoprolol Succ XL (Toprol Xl) 50 MG TAB.ER.24H   1 TAB PO DAILY AFIB  (Reported
)
Mirtazapine 15 MG TABLET   0.5 TAB PO QPM SLEEP  (Reported)
Mometasone/Formoterol (Dulera 200 Mcg/5 Mcg Inhaler) 13 GM HFA.AER.AD   2 PUF 
INH BID COPD  (Reported)
Omeprazole 40 MG CAPSULE.DR   1 TAB PO DAILY HEARTBURN  (Reported)
Ranolazine (Ranexa 500MG) 500 MG TAB.ER.12H   1 TAB PO BID ANGINA  (Reported)
Rosuvastatin Calcium (Crestor) 10 MG TABLET   1 TAB PO DAILY HEART HEALTH  (
Reported)
Sennosides/Docusate Sodium (Senna-Docusate Sodium Tablet) 1 EACH TABLET   1 TAB 
PO BID PRN CONSTIPATION  (Reported)
Tiotropium Bromide (Spiriva) 18 MCG CAP.W.DEV   1 TAB INH DAILY BREATHING 
PROBLEMS  (Reported)
Umeclidinium Bromide (Incruse Ellipta) 62.5 MCG/ACTUATION BLST.W.DEV   1 PUFF 
INH DAILY COPD  (Reported)
 
Current Medications:
 Current Medications
 
 
  Sig/Enrique Start time  Last
 
Medication Dose Route Stop Time Status Admin
 
Acetaminophen 650 MG Q6-PRN PRN 04/21 0900 AC 
 
  PO   
 
Albuterol Sulfate 2 PUF Q4P PRN 04/21 0930 AC 
 
  INH   
 
Alprazolam 0.5 MG BID 04/21 1000 AC 04/22
 
  PO 04/28 0959  0932
 
Amiodarone HCl 100 MG Q48 04/21 1000 AC 04/21
 
  PO   1001
 
Atorvastatin Calcium 40 MG 1700 04/21 1700 AC 04/21
 
  PO   1649
 
Budesonide/ 2 PUF BID 04/21 1000 AC 04/22
 
Formoterol Fumarate  INH   0932
 
Diphenhydramine HCl 50 MG ONCE ONE 04/21 2315 DC 04/21
 
  PO 04/21 2316  2303
 
Diphenhydramine HCl 50 MG ONCE ONE 04/21 2245 DC 
 
  PO 04/21 2246  
 
Ezetimibe 10 MG DAILY 04/21 1000 AC 04/22
 
  PO   0932
 
Levothyroxine Sodium 0.025 MG DAILY AC 04/21 0915 AC 04/22
 
  PO   0614
 
Metoprolol Succinate 50 MG DAILY 04/21 1000 AC 04/22
 
  PO   0932
 
Mirtazapine 7.5 MG AT BEDTIME 04/21 2200 AC 04/21
 
  PO   2228
 
Multivitamins 1 TAB DAILY 04/21 1000 AC 04/22
 
  PO   0932
 
Omeprazole 40 MG DAILY AC 04/21 0901 AC 04/22
 
  PO   0614
 
Ranolazine 500 MG BID 04/21 2200 AC 04/22
 
  PO   0932
 
Senna 187 MG AT BEDTIME AS NEED.. 04/21 0915 AC 
 
  PO   
 
 
 
 
Past History
 
Travel History
Traveled to [place] past 21 day No
 
Medical History
Neurological: NONE
EENT: NONE
Cardiovascular: CAD, hypertension, hyperlipidemia, myocardial infarction, STENTS
Respiratory: pneumonia, pulmonary fibrosis, LUNG CA ILD
Gastrointestinal: peptic ulcer disease
Hepatic: NONE
Renal: chronic kidney disease
Musculoskeletal: disk herniation
Psychiatric: NONE, anxiety
Endocrine: NONE
Blood Disorders: anemia
Cancer(s): lung cancer
 
Surgical History
Surgical History: STENTS (5) HYSTERECTOMY,LAMINECTOMY
 
Family History
Relations & Conditions If Any:
MOTHER (Leukemia).
FATHER (Heart Attack).
SISTER (ovarian cancer).
BROTHER (Diabetes).
 
 
Psychosocial History
Where Do You Live? Home
Who Do You Live With? self
Services at Home: Nursing, Physical Therapy
Primary Language: English
Smoking Status: Former Smoker
ETOH Use: denies use
 
Functional Ability
ADLs
Independent: dressing, eating, toileting, bathing. 
Ambulation: cane
IADLs
Independent: shopping, housework, finances, food prep, telephone, transportation
, medication admin. 
 
Employment History
Employment: Retired
Profession/Employer: 
 
ECHO Results (as available)
Date of last Echo 05/18/16
EF% 60
 
Review of Systems
 
Review of Systems
Constitutional:
Reports: malaise, weakness.  Denies: fever. 
EENTM:
Denies: icterus, epistaxis. 
Cardiovascular:
Reports: chest pain.  Denies: edema, syncope. 
Respiratory:
Reports: short of breath.  Denies: cough. 
GI:
Reports: see HPI. 
Genitourinary:
Denies: dysuria, hematuria. 
Musculoskeletal:
Denies: muscle stiffness, neck pain. 
Skin:
Denies: jaundice, lesions. 
Neurological/Psychological:
Denies: cognitive dysfunction, headache. 
Hematologic/Endocrine:
Denies: bruising, bleeding. 
 
Exam & Diagnostic Data
Vital Signs and I&O
Vital Signs
 
 
Date Time Temp Pulse Resp B/P B/P Pulse O2 O2 Flow FiO2
 
     Mean Ox Delivery Rate 
 
04/22 0932  83  110/60     
 
04/22 0932  83  110/60     
 
04/22 0800      95 Nasal 2.0L 
 
       Cannula  
 
04/22 0730 98.9 83 16 110/60  91 Nasal 2.5L 
 
       Cannula  
 
04/22 0019 99.5 88 20 120/60  91 Nasal 2.5L 
 
       Cannula  
 
04/22 0000      91 Nasal 2.5L 
 
       Cannula  
 
04/21 2228  84  112/60     
 
04/21 1550 98.3 80 18 112/62  90 Nasal 2.5L 
 
       Cannula  
 
 
 Intake & Output
 
 
 04/22 1600 04/22 0400 04/21 1600 04/21 0400 04/20 1600 04/20 0400
 
Intake Total 0 120 400   
 
Output Total 300 2 300   
 
Balance -300 118 100   
 
       
 
Intake, IV  0    
 
Intake, Oral 0 120 400   
 
Number 0 0    
 
Bowel      
 
Movements      
 
Output, Urine 300 2 300   
 
Patient   108 lb   
 
Weight      
 
Weight   Estimated   
 
Measurement      
 
Method      
 
 
 
Physical Exam:
Ill-appearing white female, oxygen by nasal cannula.  Skin without lesion.  No 
adenopathy.  Sclera anicteric.  No oropharyngeal lesion.  Heart regular rhythm 
without murmur.  Lungs clear bilaterally.  Abdomen is soft and nondistended with
normal bowel sounds, and no tenderness, mass or organomegaly.  Extremities 
without clubbing, cyanosis or edema.
 
Results
Pertinent Lab Results:
 Laboratory Tests
 
 
 04/21 04/21
 
 1856 1345
 
Chemistry  
 
  Iron (37 - 170 ug/dL)  57
 
  TIBC (265 - 497 ug/dL)  411
 
  Ferritin (11.1 - 264 ng/mL)  41.4
 
  Troponin I (< 0.11 ng/ml) 0.02 0.02
 
Hematology  
 
  CBC w Diff NO MAN DIFF REQ 
 
  WBC (4.8 - 10.8 /CUMM) 7.6 
 
  RBC (4.20 - 5.40 /CUMM) 3.48  L 
 
  Hgb (12.0 - 16.0 G/DL) 10.6  L 
 
  Hct (37 - 47 %) 32.5  L 
 
  MCV (81.0 - 99.0 FL) 93.4 
 
  MCH (27.0 - 31.0 PG) 30.4 
 
  RDW (11.5 - 14.5 %) 21.5  H 
 
  Plt Count (130 - 400 /CUMM) 217 
 
  MPV (7.4 - 10.4 FL) 8.5 
 
  Gran % (42.2 - 75.2 %) 81.1  H 
 
  Lymphocytes % (20.5 - 51.1 %) 5.5  L 
 
  Monocytes % (1.7 - 9.3 %) 9.5  H 
 
  Eosinophils % (0 - 5 %) 3.9 
 
  Basophils % (0.0 - 2.0 %) 0  L 
 
  Absolute Granulocytes (1.4 - 6.5 /CUMM) 6.2 
 
  Absolute Lymphocytes (1.2 - 3.4 /CUMM) 0.4  L 
 
  Absolute Monocytes (0.10 - 0.60 /CUMM) 0.7  H 
 
  Absolute Eosinophils (0.0 - 0.7 /CUMM) 0.3 
 
  Absolute Basophils (0.0 - 0.2 /CUMM) 0 
 
  PUBS MCHC (33.0 - 37.0 G/DL) 32.5  L 
 
 
 
 
 04/21
 
 0627
 
Chemistry 
 
  Sodium (137 - 145 mmol/L) 142
 
  Potassium (3.5 - 5.1 mmol/L) 5.6  H
 
  Chloride (98 - 107 mmol/L) 110  H
 
  Carbon Dioxide (22 - 30 mmol/L) 20  L
 
  Anion Gap (5 - 16) 12
 
  BUN (7 - 17 mg/dL) 39  H
 
  Creatinine (0.5 - 1.0 mg/dL) 1.7  H
 
  Estimated GFR (>60 ml/min) 30  L
 
  BUN/Creatinine Ratio (7 - 25 %) 22.9
 
  Glucose (65 - 99 mg/dL) 98
 
  Calcium (8.4 - 10.2 mg/dL) 9.3
 
  Magnesium (1.6 - 2.3 mg/dL) 1.9
 
  Total Bilirubin (0.2 - 1.3 mg/dL) 0.5
 
  AST (14 - 36 U/L) 29
 
  ALT (9 - 52 U/L) 33
 
  Alkaline Phosphatase (<127 U/L) 184  H
 
  Troponin I (< 0.11 ng/ml) < 0.01
 
  Pro-B-Natriuretic Pept (<125 pg/mL) 2840  H
 
  Total Protein (6.3 - 8.2 g/dL) 6.0  L
 
  Albumin (3.5 - 5.0 g/dL) 3.5
 
  Globulin (1.9 - 4.2 gm/dL) 2.5
 
  Albumin/Globulin Ratio (1.1 - 2.2 %) 1.4
 
  Free T4 (0.78 - 2.44 ng/dL) 1.43
 
  Total T3 (0.97 - 1.69 ng/mL) 1.13
 
  TSH &T3 &Free T4 Intrp (0.270 - 4.20 uIU/mL) 12.400  H
 
Hematology 
 
  CBC w Diff MAN DIFF ORDERED
 
  WBC (4.8 - 10.8 /CUMM) 8.5
 
  RBC (4.20 - 5.40 /CUMM) 2.27  L
 
  Hgb (12.0 - 16.0 G/DL) 6.8 *L
 
  Hct (37 - 47 %) 22.0  L
 
  MCV (81.0 - 99.0 FL) 97.0
 
  MCH (27.0 - 31.0 PG) 29.8
 
  RDW (11.5 - 14.5 %) 26.4  H
 
  Plt Count (130 - 400 /CUMM) 242
 
  MPV (7.4 - 10.4 FL) 7.7
 
  Gran % (42.2 - 75.2 %) 86.7  H
 
  Lymphocytes % (20.5 - 51.1 %) 4.7  L
 
  Monocytes % (1.7 - 9.3 %) 6.2
 
  Eosinophils % (0 - 5 %) 2.4
 
  Basophils % (0.0 - 2.0 %) 0  L
 
  Absolute Granulocytes (1.4 - 6.5 /CUMM) 7.3  H
 
  Segmented Neutrophils (42.2 - 75.2 %) 90  H
 
  Absolute Lymphocytes (1.2 - 3.4 /CUMM) 0.4  L
 
  Lymphocytes (20.5 - 51.1 %) 3  L
 
  Monocytes (1.7 - 9.3 %) 4
 
  Absolute Monocytes (0.10 - 0.60 /CUMM) 0.5
 
  Eosinophils (0 - 5.0 %) 3
 
  Absolute Eosinophils (0.0 - 0.7 /CUMM) 0.2
 
  Absolute Basophils (0.0 - 0.2 /CUMM) 0
 
  Nucleated RBCs (0.0 - 0.0 /100WBC) 1  H
 
  Platelet Estimate (ADEQUATE) ADEQUATE
 
  Polychromasia 1+
 
  Hypochromic-Microcytic 1+
 
  Poikilocytosis 1+
 
  Anisocytosis 1+
 
  Macrocytic Cells FEW
 
  Ovalocytes 1+
 
  PUBS MCHC (33.0 - 37.0 G/DL) 30.7  L
 
Other Body Source 
 
  Fld Total RBCs Counted (%) 100
 
 
 
 
Assessment/Plan
Assessment/Recommendations:
Patient with chronic anemia secondary to iron deficiency (previous GI blood loss
deemed secondary to intestinal angiodysplasias), and CKD.  The patient has been 
on triple antiplatelet therapy for CAD with stents.  She is not anticoagulated. 
She has been receiving iron infusions and erythropoietin, per nephrology.  She 
now presents with severe symptomatic anemia (chest pain, dyspnea) and Hemoccult-
positive stool.  She has had some vague indigestion, and chronic constipation, 
but no nausea, vomiting, melena or bright red blood per rectum.  Her hemoglobin 
has increased more than expected, status post 2 units per PRBC transfusion, but 
has not been rechecked today.
 
Recommendations
* Continue regular diet
* Check CBC today and tomorrow
* Resume ASA given cardiac risks
* Empiric PPI twice a day
* At this point, no plans for urgent endoscopy
 
 
Consult Acknowledgment
- Thank you for your consult request.

## 2017-04-23 VITALS — DIASTOLIC BLOOD PRESSURE: 70 MMHG | SYSTOLIC BLOOD PRESSURE: 106 MMHG

## 2017-04-23 VITALS — DIASTOLIC BLOOD PRESSURE: 60 MMHG | SYSTOLIC BLOOD PRESSURE: 104 MMHG

## 2017-04-23 LAB
ABSOLUTE GRANULOCYTE CT: 8.3 /CUMM (ref 1.4–6.5)
BASOPHILS # BLD: 0 /CUMM (ref 0–0.2)
BASOPHILS NFR BLD: 0.1 % (ref 0–2)
EOSINOPHIL # BLD: 0.3 /CUMM (ref 0–0.7)
EOSINOPHIL NFR BLD: 2.8 % (ref 0–5)
ERYTHROCYTE [DISTWIDTH] IN BLOOD BY AUTOMATED COUNT: 20.8 % (ref 11.5–14.5)
GRANULOCYTES NFR BLD: 82.4 % (ref 42.2–75.2)
HCT VFR BLD CALC: 30.3 % (ref 37–47)
LYMPHOCYTES # BLD: 0.5 /CUMM (ref 1.2–3.4)
MCH RBC QN AUTO: 30.3 PG (ref 27–31)
MCHC RBC AUTO-ENTMCNC: 32.5 G/DL (ref 33–37)
MCV RBC AUTO: 93.2 FL (ref 81–99)
MONOCYTES # BLD: 1 /CUMM (ref 0.1–0.6)
PLATELET # BLD: 216 /CUMM (ref 130–400)
PMV BLD AUTO: 8 FL (ref 7.4–10.4)
RED BLOOD CELL CT: 3.25 /CUMM (ref 4.2–5.4)
WBC # BLD AUTO: 10 /CUMM (ref 4.8–10.8)

## 2017-04-23 NOTE — PN- GASTROENTEROLOGY
Assessment/Plan
Assessment/Recommendations:
Patient with chronic anemia secondary to iron deficiency (previous GI blood loss
deemed secondary to intestinal angiodysplasias), and CKD.  The patient has been 
on triple antiplatelet therapy for CAD with stents.  She is not anticoagulated. 
She has been receiving iron infusions and erythropoietin, per nephrology.  
 
The patient now presents with severe symptomatic anemia (chest pain, dyspnea) 
and Hemoccult-positive stool without overt bleeding.  Hemoglobin/hematocrit are 
stable
 
Recommendations
* Continue regular diet
* Check CBC tomorrow
* Continue ASA, PPI
* No plans for inpatient endoscopy
 
 
Subjective
Subjective:
No chest pain, nausea, vomiting, indigestion, abdominal pain.  No bowel 
movements, blood per rectum or melena.
 
Objective
Vital Signs and I&Os
Vital Signs
 
 
Date Time Temp Pulse Resp B/P B/P Pulse O2 O2 Flow FiO2
 
     Mean Ox Delivery Rate 
 
04/23 0957  91  104/60     
 
04/23 0956  91  104/60     
 
04/23 0956  91  104/60     
 
04/23 0807 97.5 91 14 104/60  94 Nasal 2.5L 
 
       Cannula  
 
04/23 0000      98 Nasal 2.5L 
 
       Cannula  
 
04/22 2131  88  108/50     
 
04/22 1637 98.9 85 18 90/40  92 Nasal 2.5L 
 
       Cannula  
 
 
 Intake & Output
 
 
 04/23 1600 04/23 0400 04/22 1600 04/22 0400 04/21 1600 04/21 0400
 
Intake Total 200 480 850 120 400 
 
Output Total   1000 2 300 
 
Balance 200 480 -150 118 100 
 
       
 
Intake, IV    0  
 
Intake, Oral 200 480 850 120 400 
 
Number   0 0  
 
Bowel      
 
Movements      
 
Output, Urine   1000 2 300 
 
Patient     108 lb 
 
Weight      
 
Weight     Estimated 
 
Measurement      
 
Method      
 
 
 
Physical Exam:
Sclera anicteric.  No adenopathy.  Abdomen is soft and nondistended with normal 
bowel sounds, and no tenderness, mass or organomegaly.
Current Medications:
 Current Medications
 
 
  Sig/Enrique Start time  Last
 
Medication Dose Route Stop Time Status Admin
 
Acetaminophen 650 MG Q6-PRN PRN 04/21 0900 AC 
 
  PO   
 
Albuterol Sulfate 2 PUF Q4P PRN 04/21 0930 AC 
 
  INH   
 
Alprazolam 0.5 MG BID 04/21 1000 AC 04/23
 
  PO 04/28 0959  0955
 
Amiodarone HCl 100 MG Q48 04/21 1000 AC 04/23
 
  PO   0957
 
Aspirin 81 MG DAILY 04/22 1521 AC 04/23
 
  PO   0957
 
Atorvastatin Calcium 40 MG 1700 04/21 1700 AC 04/22
 
  PO   1655
 
Budesonide/ 2 PUF BID 04/21 1000 AC 04/23
 
Formoterol Fumarate  INH   0959
 
Diphenhydramine HCl 50 MG .STK-MED ONE 04/22 2059 DC 
 
  PO 04/22 2100  
 
Diphenhydramine HCl 50 MG ONCE ONE 04/22 2030 DC 04/22
 
  PO 04/22 2031  2130
 
Ezetimibe 10 MG DAILY 04/21 1000 AC 04/23
 
  PO   0956
 
Levothyroxine Sodium 0.025 MG DAILY AC 04/21 0915 AC 04/23
 
  PO   0725
 
Metoprolol Succinate 50 MG DAILY 04/21 1000 AC 04/23
 
  PO   0956
 
Mirtazapine 7.5 MG AT BEDTIME 04/21 2200 AC 04/22
 
  PO   2131
 
Multivitamins 1 TAB DAILY 04/21 1000 AC 04/23
 
  PO   0956
 
Omeprazole 40 MG BID 04/23 1000 AC 04/23
 
  PO   0957
 
Omeprazole 40 MG 1/2H B/BREAKF/DINNER 04/22 1630 DC 04/22
 
  PO   1655
 
Omeprazole 40 MG DAILY AC 04/21 0901 DC 04/22
 
  PO   0614
 
Patient Medication  1 UNIT ONE NR 04/22 1530 ND 
 
Teaching  ED 04/22 1600  
 
Ranolazine 500 MG BID 04/21 2200 AC 04/23
 
  PO   0956
 
Senna 187 MG AT BEDTIME AS NEED.. 04/21 0915 AC 
 
  PO   
 
 
 
 
Results
Pertinent Lab Results:
 Laboratory Tests
 
 
 04/23 04/22
 
 0707 1552
 
Chemistry  
 
  Sodium (137 - 145 mmol/L)  142
 
  Potassium (3.5 - 5.1 mmol/L)  4.5
 
  Chloride (98 - 107 mmol/L)  109  H
 
  Carbon Dioxide (22 - 30 mmol/L)  20  L
 
  Anion Gap (5 - 16)  14
 
  BUN (7 - 17 mg/dL)  29  H
 
  Creatinine (0.5 - 1.0 mg/dL)  1.5  H
 
  Estimated GFR (>60 ml/min)  35  L
 
  BUN/Creatinine Ratio (7 - 25 %)  19.3
 
Hematology  
 
  CBC w Diff NO MAN DIFF REQ NO MAN DIFF REQ
 
  WBC (4.8 - 10.8 /CUMM) 10.0 9.3
 
  RBC (4.20 - 5.40 /CUMM) 3.25  L 3.32  L
 
  Hgb (12.0 - 16.0 G/DL) 9.8  L 10.0  L
 
  Hct (37 - 47 %) 30.3  L 31.0  L
 
  MCV (81.0 - 99.0 FL) 93.2 93.2
 
  MCH (27.0 - 31.0 PG) 30.3 30.0
 
  RDW (11.5 - 14.5 %) 20.8  H 21.3  H
 
  Plt Count (130 - 400 /CUMM) 216 218
 
  MPV (7.4 - 10.4 FL) 8.0 8.3
 
  Gran % (42.2 - 75.2 %) 82.4  H 83.5  H
 
  Lymphocytes % (20.5 - 51.1 %) 4.8  L 6.5  L
 
  Monocytes % (1.7 - 9.3 %) 9.9  H 7.8
 
  Eosinophils % (0 - 5 %) 2.8 2.2
 
  Basophils % (0.0 - 2.0 %) 0.1 0  L
 
  Absolute Granulocytes (1.4 - 6.5 /CUMM) 8.3  H 7.8  H
 
  Absolute Lymphocytes (1.2 - 3.4 /CUMM) 0.5  L 0.6  L
 
  Absolute Monocytes (0.10 - 0.60 /CUMM) 1.0  H 0.7  H
 
  Absolute Eosinophils (0.0 - 0.7 /CUMM) 0.3 0.2
 
  Absolute Basophils (0.0 - 0.2 /CUMM) 0 0
 
  PUBS MCHC (33.0 - 37.0 G/DL) 32.5  L 32.2  L
 
 
 
 
 04/21 04/21
 
 1856 1345
 
Chemistry  
 
  Iron (37 - 170 ug/dL)  57
 
  TIBC (265 - 497 ug/dL)  411
 
  Ferritin (11.1 - 264 ng/mL)  41.4
 
  Troponin I (< 0.11 ng/ml) 0.02 0.02
 
Hematology  
 
  CBC w Diff NO MAN DIFF REQ 
 
  WBC (4.8 - 10.8 /CUMM) 7.6 
 
  RBC (4.20 - 5.40 /CUMM) 3.48  L 
 
  Hgb (12.0 - 16.0 G/DL) 10.6  L 
 
  Hct (37 - 47 %) 32.5  L 
 
  MCV (81.0 - 99.0 FL) 93.4 
 
  MCH (27.0 - 31.0 PG) 30.4 
 
  RDW (11.5 - 14.5 %) 21.5  H 
 
  Plt Count (130 - 400 /CUMM) 217 
 
  MPV (7.4 - 10.4 FL) 8.5 
 
  Gran % (42.2 - 75.2 %) 81.1  H 
 
  Lymphocytes % (20.5 - 51.1 %) 5.5  L 
 
  Monocytes % (1.7 - 9.3 %) 9.5  H 
 
  Eosinophils % (0 - 5 %) 3.9 
 
  Basophils % (0.0 - 2.0 %) 0  L 
 
  Absolute Granulocytes (1.4 - 6.5 /CUMM) 6.2 
 
  Absolute Lymphocytes (1.2 - 3.4 /CUMM) 0.4  L 
 
  Absolute Monocytes (0.10 - 0.60 /CUMM) 0.7  H 
 
  Absolute Eosinophils (0.0 - 0.7 /CUMM) 0.3 
 
  Absolute Basophils (0.0 - 0.2 /CUMM) 0 
 
  PUBS MCHC (33.0 - 37.0 G/DL) 32.5  L 
 
 
 
 
 04/21
 
 0627
 
Chemistry 
 
  Sodium (137 - 145 mmol/L) 142
 
  Potassium (3.5 - 5.1 mmol/L) 5.6  H
 
  Chloride (98 - 107 mmol/L) 110  H
 
  Carbon Dioxide (22 - 30 mmol/L) 20  L
 
  Anion Gap (5 - 16) 12
 
  BUN (7 - 17 mg/dL) 39  H
 
  Creatinine (0.5 - 1.0 mg/dL) 1.7  H
 
  Estimated GFR (>60 ml/min) 30  L
 
  BUN/Creatinine Ratio (7 - 25 %) 22.9
 
  Glucose (65 - 99 mg/dL) 98
 
  Calcium (8.4 - 10.2 mg/dL) 9.3
 
  Magnesium (1.6 - 2.3 mg/dL) 1.9
 
  Total Bilirubin (0.2 - 1.3 mg/dL) 0.5
 
  AST (14 - 36 U/L) 29
 
  ALT (9 - 52 U/L) 33
 
  Alkaline Phosphatase (<127 U/L) 184  H
 
  Troponin I (< 0.11 ng/ml) < 0.01
 
  Pro-B-Natriuretic Pept (<125 pg/mL) 2840  H
 
  Total Protein (6.3 - 8.2 g/dL) 6.0  L
 
  Albumin (3.5 - 5.0 g/dL) 3.5
 
  Globulin (1.9 - 4.2 gm/dL) 2.5
 
  Albumin/Globulin Ratio (1.1 - 2.2 %) 1.4
 
  Free T4 (0.78 - 2.44 ng/dL) 1.43
 
  Total T3 (0.97 - 1.69 ng/mL) 1.13
 
  TSH &T3 &Free T4 Intrp (0.270 - 4.20 uIU/mL) 12.400  H
 
Hematology 
 
  CBC w Diff MAN DIFF ORDERED
 
  WBC (4.8 - 10.8 /CUMM) 8.5
 
  RBC (4.20 - 5.40 /CUMM) 2.27  L
 
  Hgb (12.0 - 16.0 G/DL) 6.8 *L
 
  Hct (37 - 47 %) 22.0  L
 
  MCV (81.0 - 99.0 FL) 97.0
 
  MCH (27.0 - 31.0 PG) 29.8
 
  RDW (11.5 - 14.5 %) 26.4  H
 
  Plt Count (130 - 400 /CUMM) 242
 
  MPV (7.4 - 10.4 FL) 7.7
 
  Gran % (42.2 - 75.2 %) 86.7  H
 
  Lymphocytes % (20.5 - 51.1 %) 4.7  L
 
  Monocytes % (1.7 - 9.3 %) 6.2
 
  Eosinophils % (0 - 5 %) 2.4
 
  Basophils % (0.0 - 2.0 %) 0  L
 
  Absolute Granulocytes (1.4 - 6.5 /CUMM) 7.3  H
 
  Segmented Neutrophils (42.2 - 75.2 %) 90  H
 
  Absolute Lymphocytes (1.2 - 3.4 /CUMM) 0.4  L
 
  Lymphocytes (20.5 - 51.1 %) 3  L
 
  Monocytes (1.7 - 9.3 %) 4
 
  Absolute Monocytes (0.10 - 0.60 /CUMM) 0.5
 
  Eosinophils (0 - 5.0 %) 3
 
  Absolute Eosinophils (0.0 - 0.7 /CUMM) 0.2
 
  Absolute Basophils (0.0 - 0.2 /CUMM) 0
 
  Nucleated RBCs (0.0 - 0.0 /100WBC) 1  H
 
  Platelet Estimate (ADEQUATE) ADEQUATE
 
  Polychromasia 1+
 
  Hypochromic-Microcytic 1+
 
  Poikilocytosis 1+
 
  Anisocytosis 1+
 
  Macrocytic Cells FEW
 
  Ovalocytes 1+
 
  PUBS MCHC (33.0 - 37.0 G/DL) 30.7  L
 
Other Body Source 
 
  Fld Total RBCs Counted (%) 100

## 2017-04-23 NOTE — PN- ATT ADDEND
Attending Addendum
Attending Brief Note
Covering attending note.
Patient is fairly stable no new complaints no abdominal pain no discomfort or 
chest pain.
 
 Vital Signs
 
 
Date Time Temp Pulse Resp B/P B/P Pulse O2 O2 Flow FiO2
 
     Mean Ox Delivery Rate 
 
04/23 0807 97.5 91 14 104/60  94 Nasal 2.5L 
 
       Cannula  
 
04/23 0000      98 Nasal 2.5L 
 
       Cannula  
 
04/22 2131  88  108/50     
 
04/22 1637 98.9 85 18 90/40  92 Nasal 2.5L 
 
       Cannula  
 
04/22 0932  83  110/60     
 
04/22 0932  83  110/60     
 
 
 Intake & Output
 
 
 04/23 1600 04/23 0800 04/23 0000
 
Intake Total  200 480
 
Output Total   
 
Balance  200 480
 
    
 
Intake, Oral  200 480
 
 
Examination
Patient awake alert oriented 3.
Conjunctivae is pale sclera is anicteric
Neck is supple S1-S2 is normal
Lungs shows diminished air entry bilaterally with expiratory wheezing with fine 
basal crackles.
Abdomen is soft nontender bowel sounds are present.
No focal neurological deficit.
 Laboratory Tests
 
 
 04/23 04/22
 
 0707 1552
 
Chemistry  
 
  Sodium (137 - 145 mmol/L)  142
 
  Potassium (3.5 - 5.1 mmol/L)  4.5
 
  Chloride (98 - 107 mmol/L)  109  H
 
  Carbon Dioxide (22 - 30 mmol/L)  20  L
 
  Anion Gap (5 - 16)  14
 
  BUN (7 - 17 mg/dL)  29  H
 
  Creatinine (0.5 - 1.0 mg/dL)  1.5  H
 
  Estimated GFR (>60 ml/min)  35  L
 
  BUN/Creatinine Ratio (7 - 25 %)  19.3
 
Hematology  
 
  CBC w Diff Pending NO MAN DIFF REQ
 
  WBC (4.8 - 10.8 /CUMM) Pending 9.3
 
  RBC (4.20 - 5.40 /CUMM) Pending 3.32  L
 
  Hgb (12.0 - 16.0 G/DL) Pending 10.0  L
 
  Hct (37 - 47 %) Pending 31.0  L
 
  MCV (81.0 - 99.0 FL) Pending 93.2
 
  MCH (27.0 - 31.0 PG) Pending 30.0
 
  RDW (11.5 - 14.5 %) Pending 21.3  H
 
  Plt Count (130 - 400 /CUMM) Pending 218
 
  MPV (7.4 - 10.4 FL) Pending 8.3
 
  Gran % (42.2 - 75.2 %)  83.5  H
 
  Lymphocytes % (20.5 - 51.1 %)  6.5  L
 
  Monocytes % (1.7 - 9.3 %)  7.8
 
  Eosinophils % (0 - 5 %)  2.2
 
  Basophils % (0.0 - 2.0 %)  0  L
 
  Absolute Granulocytes (1.4 - 6.5 /CUMM)  7.8  H
 
  Absolute Lymphocytes (1.2 - 3.4 /CUMM)  0.6  L
 
  Absolute Monocytes (0.10 - 0.60 /CUMM)  0.7  H
 
  Absolute Eosinophils (0.0 - 0.7 /CUMM)  0.2
 
  Absolute Basophils (0.0 - 0.2 /CUMM)  0
 
  PUBS MCHC (33.0 - 37.0 G/DL) Pending 32.2  L
 
 
 
Assessment
Assessment
Anemia secondary to upper GI bleed also possibly secondary to intestinal 
angiodysplasia and chronic anemia secondary to see daily.
Patient was seen by GI
Advised patient to continue aspirin given her cardiac risk also empiric PPI 
twice a day is there is no plans for the patient to get endoscopy.

## 2017-04-23 NOTE — PN- HOUSESTAFF
Subjective
Follow-up For:
Acute symptomatic anemia on chronic anemia
Demand ischaemia
CKD
Hamoccult postive stools
 
Tele-Events Since Last Visit:
NSR 74-85bpm
Subjective:
I saw and examined the patient today 
She is very frustated about not having a definitive diagnosis of her condition, 
She want to know where she is bleeding. She didnt had a bowel movement after she
came to the hospital. So we will provide a aggressive bowel regimen today.
She still had tiredness which didnt improve according to her since admission
 
She denies any chest pain, shortness of breath, abdominal pain. Still on 2L of 
oxygen. 
 
Review of Systems
Constitutional:
Reports: see HPI. 
Comments:
ROS negative except the above.
 
Objective
Last 24 Hrs of Vital Signs/I&O
 Vital Signs
 
 
Date Time Temp Pulse Resp B/P B/P Pulse O2 O2 Flow FiO2
 
     Mean Ox Delivery Rate 
 
 0000      98 Nasal 2.5L 
 
       Cannula  
 
 2131  88  108/50     
 
 1637 98.9 85 18 90/40  92 Nasal 2.5L 
 
       Cannula  
 
 0932  83  110/60     
 
 0932  83  110/60     
 
 0800      95 Nasal 2.0L 
 
       Cannula  
 
 
 Intake & Output
 
 
  0800  0000  1600
 
Intake Total 200 480 850
 
Output Total   700
 
Balance 200 480 150
 
    
 
Intake, Oral 200 480 850
 
Number   0
 
Bowel   
 
Movements   
 
Output, Urine   700
 
 
 
 
Physical Exam
General Appearance: Alert, Oriented X3, Cooperative
Skin: No Rashes, No Breakdown
HEENT: Atraumatic, PERRLA, EOMI
Neck: Supple
Cardiovascular: Normal S1, Normal S2
Lungs: mild bibasilar crackles present
Abdomen: Normal Bowel Sounds, Soft, No Tenderness
Neurological: Normal Speech, Normal Tone, Sensation Intact
Extremities: No Clubbing, No Cyanosis, No Edema
Vascular: Normal Pulses, Pulses Symmetrical
Current Medications:
 Current Medications
 
 
  Sig/Enrique Start time  Last
 
Medication Dose Route Stop Time Status Admin
 
Acetaminophen 650 MG Q6-PRN PRN  0900 AC 
 
  PO   
 
Albuterol Sulfate 2 PUF Q4P PRN  0930 AC 
 
  INH   
 
Alprazolam 0.5 MG BID  1000 AC 
 
  PO  0959  0955
 
Amiodarone HCl 100 MG Q48  1000 AC 
 
  PO   0957
 
Aspirin 81 MG DAILY  1521 AC 
 
  PO   0957
 
Atorvastatin Calcium 40 MG 1700  1700 AC 
 
  PO   1655
 
Bisacodyl 10 MG ONCE ONE  1315 DC 
 
  [place]  1316  
 
Bisacodyl 5 MG DAILY  1308 AC 
 
  PO   
 
Budesonide/ 2 PUF BID  1000 AC 
 
Formoterol Fumarate  INH   0959
 
Diphenhydramine HCl 50 MG .STK-MED ONE  205 DC 
 
  PO  2100  
 
Diphenhydramine HCl 50 MG ONCE ONE  2030 DC 
 
  PO  2031  2130
 
Ezetimibe 10 MG DAILY  1000 AC 
 
  PO   0956
 
Levothyroxine Sodium 0.025 MG DAILY AC  0915 AC 
 
  PO   0725
 
Magnesium Hydroxide 30 ML ONE ONE  1315 DC 
 
  PO  1316  
 
Metoprolol Succinate 50 MG DAILY  1000 AC 
 
  PO   0956
 
Mirtazapine 7.5 MG AT BEDTIME  2200 AC 
 
  PO   2131
 
Multivitamins 1 TAB DAILY  1000 AC 
 
  PO   0956
 
Omeprazole 40 MG BID  1000 AC 
 
  PO   0957
 
Omeprazole 40 MG 1/2H B/BREAKF/DINNER  1630 DC 
 
  PO   1655
 
Omeprazole 40 MG DAILY AC  0901 DC 
 
  PO   0614
 
Patient Medication  1 UNIT ONE NR  1530 DC 
 
Teaching  ED  1600  
 
Ranolazine 500 MG BID  2200 AC 
 
  PO   0956
 
Senna 187 MG AT BEDTIME AS NEED..  0915 AC 
 
  PO   
 
 
 
 
Last 24 Hrs of Lab/Stephon Results
Last 24 Hrs of Labs/Mics:
 Laboratory Tests
 
17 0707:
CBC w Diff NO MAN DIFF REQ, RBC 3.25  L, MCV 93.2, MCH 30.3, RDW 20.8  H, MPV 
8.0, Gran % 82.4  H, Lymphocytes % 4.8  L, Monocytes % 9.9  H, Eosinophils % 2.8
, Basophils % 0.1, Absolute Granulocytes 8.3  H, Absolute Lymphocytes 0.5  L, 
Absolute Monocytes 1.0  H, Absolute Eosinophils 0.3, Absolute Basophils 0, PUBS 
MCHC 32.5  L
 
17 1552:
Anion Gap 14, Estimated GFR 35  L, BUN/Creatinine Ratio 19.3, CBC w Diff NO MAN 
DIFF REQ, RBC 3.32  L, MCV 93.2, MCH 30.0, RDW 21.3  H, MPV 8.3, Gran % 83.5  H,
Lymphocytes % 6.5  L, Monocytes % 7.8, Eosinophils % 2.2, Basophils % 0  L, 
Absolute Granulocytes 7.8  H, Absolute Lymphocytes 0.6  L, Absolute Monocytes 
0.7  H, Absolute Eosinophils 0.2, Absolute Basophils 0, PUBS MCHC 32.2  L
 
 
Lines/Diet/Fluids
Lines: peripheral lines
 
Assessment/Plan
Assessment:
65 YO F with pmh of STEMI 2016 with drug eluding stent placement, h/o total 5 
stents, CAD, PAF, not on anticoagulation due to h/o GI bleed, HTN, HLD, Lung Ca 
s/p cyberknife sugery, COPD (not on home O2), PUD, CKD, Chronic anemia (h/o 
transfusions and epogen) presents to the ER from home after developing chest 
pain that developed one day POA.
 
Currently being managed in the telemetry floor for the following issues:
 
Angina, 2/2 anemia
* Demand ischaemia
* after GI evaluation - restarted on aspirin 81mg as there is no role for 
procedure given no ike blood.
* Per cardiology, does not need to be on dual antiplatelet agent even after this
episode, only ASA would be sufficient.
* H&H today 9.8/30.3
* will follow cbc tomorrow as well
 
PAF not on AC due to risk of bleeding
 
h/o CKD
 
hyperkalemia
* potassium was 5.6 yesterday, wasn't followed so have sent BEP. awaiting 
results, might need keyaxate or other agents if still high, in the setting of 
cardiac history
 
h/o COPD/lung cancer
* not on exacerbation
* trc with the goal to maintain SpO2 >90%
* continue O2
 
constipation
* she didnt have a bowel movement so far after admission
* provided with senna and dulcolax
 
Diet-  heart healthy
DVT ppx- ALPS only
Code status- DNR/DNI
Problem List:
 1. Unstable Angina
 
 2. Acute on CKD
 
 3. Tachypnea
 
 4. COPD (chronic obstructive pulmonary disease)
 
 5. Chest pain
 
 6. Anemia
 
 7. CAD (coronary artery disease)
 
 8. Chronic constipation
 
Pain Ratin
Pain Location:
chest pain
Pain Goal: Pain 4 or less
Pain Plan:
tyelnol prn
Tomorrow's Labs & Rationales:
cbc to monitor H&H
 
bep to monitor electrolytes and renal function

## 2017-04-23 NOTE — NUR
ALERT AND ORIENTED X 3. VITAL SIGNS STABLE. DENIES CHEST PAIN. + PULSES
SKIN C/D/I. NO DISCOMFORT NOTED. PATIENT RESTING AT THIS TIME
WILL CONTINUE TO MONITOR

## 2017-04-24 VITALS — SYSTOLIC BLOOD PRESSURE: 110 MMHG | DIASTOLIC BLOOD PRESSURE: 50 MMHG

## 2017-04-24 VITALS — DIASTOLIC BLOOD PRESSURE: 52 MMHG | SYSTOLIC BLOOD PRESSURE: 104 MMHG

## 2017-04-24 VITALS — SYSTOLIC BLOOD PRESSURE: 102 MMHG | DIASTOLIC BLOOD PRESSURE: 57 MMHG

## 2017-04-24 LAB
ABSOLUTE GRANULOCYTE CT: 7 /CUMM (ref 1.4–6.5)
BASOPHILS # BLD: 0 /CUMM (ref 0–0.2)
BASOPHILS NFR BLD: 0 % (ref 0–2)
EOSINOPHIL # BLD: 0.4 /CUMM (ref 0–0.7)
EOSINOPHIL NFR BLD: 4.8 % (ref 0–5)
ERYTHROCYTE [DISTWIDTH] IN BLOOD BY AUTOMATED COUNT: 20.3 % (ref 11.5–14.5)
GRANULOCYTES NFR BLD: 80.3 % (ref 42.2–75.2)
HCT VFR BLD CALC: 30.9 % (ref 37–47)
LYMPHOCYTES # BLD: 0.5 /CUMM (ref 1.2–3.4)
MCH RBC QN AUTO: 30.3 PG (ref 27–31)
MCHC RBC AUTO-ENTMCNC: 32.5 G/DL (ref 33–37)
MCV RBC AUTO: 93 FL (ref 81–99)
MONOCYTES # BLD: 0.8 /CUMM (ref 0.1–0.6)
PLATELET # BLD: 242 /CUMM (ref 130–400)
PMV BLD AUTO: 8 FL (ref 7.4–10.4)
RED BLOOD CELL CT: 3.32 /CUMM (ref 4.2–5.4)
WBC # BLD AUTO: 8.7 /CUMM (ref 4.8–10.8)

## 2017-04-24 NOTE — PN- HOUSESTAFF
Subjective
Follow-up For:
Acute symptomatic anemia on chronic anemia
Demand ischaemia
CKD
Hamoccult postive stools
Tele-Events Since Last Visit:
sinus rhythm 78-89bpm
Subjective:
I saw and examined the patient today morning
She is doing much better today, reports no pain, shortness of breath. 
 
She was asked to go for colonoscopy as an outpatient.
 
Review of Systems
Constitutional:
Reports: see HPI. 
Comments:
ROS negative except the above.
 
Objective
Last 24 Hrs of Vital Signs/I&O
 Vital Signs
 
 
Date Time Temp Pulse Resp B/P B/P Pulse O2 O2 Flow FiO2
 
     Mean Ox Delivery Rate 
 
 0008 99.1 89 20 102/57  95   
 
 0000      95 Nasal 1.0L 
 
       Cannula  
 
 2127  85  118/60     
 
 1601 97.1 92 16 106/70  93 Nasal 1.0L 
 
       Cannula  
 
 0957  91  104/60     
 
 0956  91  104/60     
 
 0956  91  104/60     
 
 0807 97.5 91 14 104/60  94 Nasal 2.5L 
 
       Cannula  
 
 0800      93 Nasal 1.0L 
 
       Cannula  
 
 
 Intake & Output
 
 
  0800  0000  1600
 
Intake Total 120 200 720
 
Output Total   800
 
Balance 120 200 -80
 
    
 
Intake, Oral 120 200 720
 
Number   0
 
Bowel   
 
Movements   
 
Output, Urine   800
 
 
 
 
Physical Exam
General Appearance: Alert, Oriented X3, Cooperative
Skin: No Rashes, No Breakdown
HEENT: Atraumatic, PERRLA, EOMI
Neck: Supple
Cardiovascular: Normal S1, Normal S2, systolic murmur
Lungs: Clear to Auscultation, Normal Air Movement
Abdomen: Normal Bowel Sounds, Soft, No Tenderness
Neurological: Normal Speech, Strength at 5/5 X4 Ext, Normal Tone
Extremities: No Clubbing, No Cyanosis, No Edema
Current Medications:
 Current Medications
 
 
  Sig/Enrique Start time  Last
 
Medication Dose Route Stop Time Status Admin
 
Acetaminophen 650 MG Q6-PRN PRN  0900 DCD 
 
  PO   
 
Albuterol Sulfate 2 PUF Q4P PRN  0930 DCD 
 
  INH   
 
Alprazolam 0.5 MG BID  1000 DCD 
 
  PO  0959  1052
 
Amiodarone HCl 100 MG Q48  1000 DCD 
 
  PO   0957
 
Aspirin 81 MG DAILY  1521 DCD 
 
  PO   1049
 
Atorvastatin Calcium 40 MG 1700  1700 DCD 
 
  PO   1622
 
Bisacodyl 5 MG DAILY  1308 DCD 
 
  PO   1050
 
Budesonide/ 2 PUF BID  1000 DCD 
 
Formoterol Fumarate  INH   1048
 
Diphenhydramine HCl 50 MG ONCE ONE  2315 DC 
 
  PO  2316  2309
 
Diphenhydramine HCl 50 MG .STK-MED ONE  2310 DC 
 
  PO  2311  
 
Ezetimibe 10 MG DAILY  1000 DCD 
 
  PO   1050
 
Levothyroxine Sodium 0.025 MG DAILY AC  0915 DCD 
 
  PO   0608
 
Metoprolol Succinate 50 MG DAILY  1000 DCD 
 
  PO   1050
 
Mirtazapine 7.5 MG AT BEDTIME  2200 DCD 
 
  PO   2127
 
Multivitamins 1 TAB DAILY  1000 DCD 
 
  PO   1050
 
Omeprazole 40 MG BID  1000 DCD 
 
  PO   1050
 
Ranolazine 500 MG BID  2200 DCD 
 
  PO   1050
 
Senna 187 MG AT BEDTIME AS NEED..  0915 DCD 
 
  PO   2127
 
Senna/Docusate Sodium 2 TAB DAILY PRN  1630 DCD 
 
  PO   1638
 
 
 
 
Last 24 Hrs of Lab/Stephon Results
Last 24 Hrs of Labs/Mics:
 Laboratory Tests
 
17 0650:
Anion Gap 10, Estimated GFR 35  L, BUN/Creatinine Ratio 14.7, CBC w Diff NO MAN 
DIFF REQ, RBC 3.32  L, MCV 93.0, MCH 30.3, RDW 20.3  H, MPV 8.0, Gran % 80.3  H,
Lymphocytes % 5.6  L, Monocytes % 9.3, Eosinophils % 4.8, Basophils % 0  L, 
Absolute Granulocytes 7.0  H, Absolute Lymphocytes 0.5  L, Absolute Monocytes 
0.8  H, Absolute Eosinophils 0.4, Absolute Basophils 0, PUBS MCHC 32.5  L
 
 
Lines/Diet/Fluids
Lines: peripheral lines
 
Assessment/Plan
Assessment:
65 YO F with pmh of STEMI 2016 with drug eluding stent placement, h/o total 5 
stents, CAD, PAF, not on anticoagulation due to h/o GI bleed, HTN, HLD, Lung Ca 
s/p cyberknife sugery, COPD (not on home O2), PUD, CKD, Chronic anemia (h/o 
transfusions and epogen) presents to the ER from home after developing chest 
pain that developed one day POA.
 
Currently being managed in the telemetry floor for the following issues:
 
Angina, 2/2 anemia
* Demand ischaemia
* after GI evaluation - restarted on aspirin 81mg as there is no role for 
procedure given no ike blood.
* Per cardiology, does not need to be on dual antiplatelet agent even after this
episode, only ASA would be sufficient.
* H&H today 9.8/30.3
* Needs to follow up with GI as outpatient for colonoscopy.
 
Possibel etiologies for bleed could be - anticoagulation, AS with AVM's (heydes 
syndrome), diverticulosis from chronic constipation.
 
PAF not on AC due to risk of bleeding
 
h/o CKD
 
hyperkalemia
* potassium was 5.6 yesterday, wasn't followed so have sent BEP. awaiting 
results, might need keyaxate or other agents if still high, in the setting of 
cardiac history
 
h/o COPD/lung cancer
* not on exacerbation
* trc with the goal to maintain SpO2 >90%
* continue O2
 
constipation
* she didnt have a bowel movement so far after admission
* provided with senna and dulcolax
 
 
 
Diet-  heart healthy
DVT ppx- ALPS only
Code status- DNR/DNI
Problem List:
 1. Unstable Angina
 
 2. Chronic constipation
 
 3. Chest pain
 
 4. Anemia
 
 5. CAD (coronary artery disease)
 
 6. Malnutrition
 
Pain Ratin
Pain Location:
n/a
Pain Goal: Pain 4 or less
Pain Plan:
tylenol prn
Tomorrow's Labs & Rationales:
none

## 2017-04-24 NOTE — PATIENT DISCHARGE INSTRUCTIONS
Discharge Instructions
 
General Discharge Information
You were seen/treated for:
GI bleed causing low H&H
Demand ischaemia
Watch for these problems:
sudden shortness of  breath, chest pain.
 
Any blood loss from urine/bowels.
Special Instructions:
Please follow up with your PCP () in a week
 
Please follow up with  (gastroenterology ) regariding outpatient 
colonoscopy
 
 
Diet
Continue normal diet: Yes
Recommended Diet: Heart Healthy
 
Activity
Full Activity/No Limits: Yes
Activity Self Limited: Yes
 
Acute Coronary Syndrome
 
Inclusion Criteria
At DC or during hospital stay patient has or had the following:
ACS DIAGNOSIS No
 
Discharge Core Measures
Meds if any: Prescribed or Continued at Discharge
Meds if any: NOT Prescribed or Continued at Discharge
 
Congestive Heart Failure
 
Inclusion Criteria
At DC or during hospital stay patient has or had the following:
CHF DIAGNOSIS No
 
Discharge Core Measures
Meds if any: Prescribed or Continued at Discharge
Meds if any: NOT Prescribed or Continued at Discharge
 
Cerebrovascular accident
 
Inclusion Criteria
At DC or during hospital stay patient has or had the following:
CVA/TIA Diagnosis No
 
Discharge Core Measures
Meds if any: Prescribed or Continued at Discharge
Meds if any: NOT Prescribed or Continued at Discharge
 
Venous thromboembolism
 
Inclusion Criteria
VTE Diagnosis No
VTE Type NONE
VTE Confirmed by (Test) NONE
 
Discharge Core Measures
- Per Current guidelines, there needs to be overlap
- treatment for the first 5 days of Warfarin therapy.
- If discharged on Warfarin prior to 5 days of
- overlap therapy, the patient will need to be
- assessed for post discharge needs including
- *Post discharge parental anticoagulation
- *Warfarin and/or parental anticoagulation education
- *Follow up date to check INR post discharge
At least 5 days overlap therapy as Inpatient No
Meds if any: Prescribed or Continued at Discharge
Note: Overlap Therapy is Warfarin and Anticoagulant
Meds if any: NOT Prescribed or Continued at Discharge

## 2017-04-24 NOTE — NUR
Physical Therapy: Consult received and chart reviewed. Pt is currently
ambulating around her room Independently. Steady gait noted- pt's dynamic
standing balance is good- pt closing her blinds and picking items off of
floor. Acute skilled PT is not indicated at this time. Will not follow. Thank
you.

## 2017-04-24 NOTE — PN- ATT ADDEND
Attending Addendum
Attending Brief Note
Patient feels general weakness
 
General Appearance: Alert, No Acute Distress
Skin: Grossly normal
HEENT: PEERLA
Neck: Supple, No JVD
Cardiovascular: Regular Rate, Normal S1, Normal S2, No Murmurs
Lungs: Clear to Auscultation, Normal Air Movement
Abdomen: Normal Bowel Sounds, Soft, No Tenderness
Neurological: Normal Speech, Strength at 5/5 X4 Ext, Cranial Nerves 3-12 NL, 
Reflexes 2+
Extremities: No Clubbing, No Cyanosis, No Edema
Vascular: Normal Pulses
 
Assessment
Status post 2 units PRBC with stable hemoglobin.  No scope per GI unless acute 
bleeding.  Troponins negative and kidney function stable.  Cardiology has agreed
to discontinue Plavix and patient will be discharged on aspirin.
 
Plan
PT evaluation
Taper oxygen
Continue current meds
Repeat CBCs in 2 week
 
 Current Medications
 
 
  Sig/Enrique Start time  Last
 
Medication Dose Route Stop Time Status Admin
 
Acetaminophen 650 MG Q6-PRN PRN 04/21 0900 AC 
 
  PO   
 
Albuterol Sulfate 2 PUF Q4P PRN 04/21 0930 AC 
 
  INH   
 
Alprazolam 0.5 MG ONCE ONE 04/23 1630 DC 04/23
 
  PO 04/23 1631  1634
 
Alprazolam 0.5 MG BID 04/21 1000 AC 04/23
 
  PO 04/28 0959  2309
 
Amiodarone HCl 100 MG Q48 04/21 1000 AC 04/23
 
  PO   0957
 
Aspirin 81 MG DAILY 04/22 1521 AC 04/23
 
  PO   0957
 
Atorvastatin Calcium 40 MG 1700 04/21 1700 AC 04/23
 
  PO   1622
 
Bisacodyl 10 MG ONCE ONE 04/23 1315 DC 
 
  [place] 04/23 1316  
 
Bisacodyl 5 MG DAILY 04/23 1308 AC 04/23
 
  PO   1621
 
Budesonide/ 2 PUF BID 04/21 1000 AC 04/23
 
Formoterol Fumarate  INH   2130
 
Diphenhydramine HCl 50 MG ONCE ONE 04/23 2315 DC 04/23
 
  PO 04/23 2316  2309
 
Diphenhydramine HCl 50 MG .STK-MED ONE 04/23 2310 DC 
 
  PO 04/23 2311  
 
Ezetimibe 10 MG DAILY 04/21 1000 AC 04/23
 
  PO   0956
 
Levothyroxine Sodium 0.025 MG DAILY AC 04/21 0915 AC 04/24
 
  PO   0608
 
Magnesium Hydroxide 30 ML ONE ONE 04/23 1315 DC 
 
  PO 04/23 1316  
 
Metoprolol Succinate 50 MG DAILY 04/21 1000 AC 04/23
 
  PO   0956
 
Mirtazapine 7.5 MG AT BEDTIME 04/21 2200 AC 04/23
 
  PO   2127
 
Multivitamins 1 TAB DAILY 04/21 1000 AC 04/23
 
  PO   0956
 
Omeprazole 40 MG BID 04/23 1000 AC 04/23
 
  PO   2131
 
Ranolazine 500 MG BID 04/21 2200 AC 04/23
 
  PO   2127
 
Senna 187 MG AT BEDTIME AS NEED.. 04/21 0915 AC 04/23
 
  PO   2127
 
Senna/Docusate Sodium 2 TAB DAILY PRN 04/23 1630 AC 04/23
 
  PO   1638
 
 
 Laboratory Tests
 
 
 04/24
 
 0650
 
Chemistry 
 
  Sodium (137 - 145 mmol/L) 141
 
  Potassium (3.5 - 5.1 mmol/L) 5.0
 
  Chloride (98 - 107 mmol/L) 110  H
 
  Carbon Dioxide (22 - 30 mmol/L) 21  L
 
  Anion Gap (5 - 16) 10
 
  BUN (7 - 17 mg/dL) 22  H
 
  Creatinine (0.5 - 1.0 mg/dL) 1.5  H
 
  Estimated GFR (>60 ml/min) 35  L
 
  BUN/Creatinine Ratio (7 - 25 %) 14.7
 
Hematology 
 
  CBC w Diff NO MAN DIFF REQ
 
  WBC (4.8 - 10.8 /CUMM) 8.7
 
  RBC (4.20 - 5.40 /CUMM) 3.32  L
 
  Hgb (12.0 - 16.0 G/DL) 10.0  L
 
  Hct (37 - 47 %) 30.9  L
 
  MCV (81.0 - 99.0 FL) 93.0
 
  MCH (27.0 - 31.0 PG) 30.3
 
  RDW (11.5 - 14.5 %) 20.3  H
 
  Plt Count (130 - 400 /CUMM) 242
 
  MPV (7.4 - 10.4 FL) 8.0
 
  Gran % (42.2 - 75.2 %) 80.3  H
 
  Lymphocytes % (20.5 - 51.1 %) 5.6  L
 
  Monocytes % (1.7 - 9.3 %) 9.3
 
  Eosinophils % (0 - 5 %) 4.8
 
  Basophils % (0.0 - 2.0 %) 0  L
 
  Absolute Granulocytes (1.4 - 6.5 /CUMM) 7.0  H
 
  Absolute Lymphocytes (1.2 - 3.4 /CUMM) 0.5  L
 
  Absolute Monocytes (0.10 - 0.60 /CUMM) 0.8  H
 
  Absolute Eosinophils (0.0 - 0.7 /CUMM) 0.4
 
  Absolute Basophils (0.0 - 0.2 /CUMM) 0
 
  PUBS MCHC (33.0 - 37.0 G/DL) 32.5  L
 
 
Vital Signs
 
 
Date Time Temp Pulse Resp B/P B/P Pulse O2 O2 Flow FiO2
 
     Mean Ox Delivery Rate 
 
04/24 0800       Nasal 1.0L 
 
       Cannula  
 
04/24 0008 99.1 89 20 102/57  95   
 
04/24 0000      95 Nasal 1.0L 
 
       Cannula  
 
04/23 2127  85  118/60     
 
04/23 1601 97.1 92 16 106/70  93 Nasal 1.0L 
 
       Cannula  
 
04/23 0957  91  104/60     
 
04/23 0956  91  104/60 04/23 0956  91  104/60

## 2017-05-03 NOTE — DISCHARGE SUMMARY
Visit Information
 
Visit Dates
Admission Date:
04/21/17
 
Discharge Date:
04/24/17
 
 
Hospital Course
 
Course
Attending Physician:
BETI STACK MD
 
Primary Care Physician:
MONICO NEWBERRY,YAKELIN LUBIN
 
Consulting Request:
   Consulting Specialty: Gastroenterology
   Consulting Physician:
Dr. Constantine Victoria MD
Hospital Course:
67 YO F with past medical history of STEMI with drug eluding stent placement, 
with history of total 5 stents, CAD, PAF, not on anticoagulation due to history 
of GI bleed, HTN, HLD, Lung Ca s/p cyberknife sugery, COPD (not on home O2), PUD
, CKD, Chronic anemia (h/o transfusions and epogen) presented to the ER from 
home after developing chest pain that developed one day prior to the admission.
 
1. symptomatic anemia
Patient was noted to be severely anemic on admission with a guaiac positive 
stools. She also had positive troponins which was likely in the setting of 
demand ischaemia. anemia improved with 2 units of PRBC.  Patient was seen by 
cardiology who recommed discontinuing Plavix and continuing aspirin.  GI cleared
using aspirin upon discharge.
 
2. GI bleed
Patient was seen by GI who considered her bleeding likely secondary to angina 
dysplasia.  Patient has had similar bleeding in the past.  Since she did not 
have active bleeding emergent colonoscopy was not pursued. Patient will follow-
up with GI as outpatient.
 
 
Allergies:
Coded Allergies:
No Known Allergies (05/25/16)
 
 
Disposition Summary
 
Disposition
Principal Diagnosis:
SYMPTOMATIC ANEMIA
Additional Diagnosis:
GI BLEED
PAROXYSMAL ATRIAL FIBRILLATION 
HYPERKALEMIA
Discharge Disposition: home or self care
 
Discharge Instructions
 
General Discharge Information
Code Status: Full Code
Patient's Diet:
heart and healthy diet
Patient's Activity:
as tolerated
Follow-Up Instructions/Appts:
follow-up with cardiology and GI as outpatient
 
Medications at Discharge
Discharge Medications:
Stop taking the following medications:
Clopidogrel Bisulfate (Plavix) 75 MG TABLET ORAL DAILY 
 
Continue taking these medications:
Omeprazole (Omeprazole) 40 MG CAPSULE.
    1 Tablet ORAL DAILY
    Comments:
       Last Taken: 4/24/17
             Time: 11 am
 
Ranolazine (Ranexa 500MG) 500 MG TAB.ER.12H
    1 Tablet ORAL TWICE DAILY
    Comments:
       LAST DOSE GIVEN ON 05/27/16 AT 9:15AM
 
Tiotropium Bromide (Spiriva) 18 MCG CAP.W.DEV
    1 Tablet Inhale through mouth DAILY
    Comments:
       Not given in hospital
 
Rosuvastatin Calcium (Crestor) 10 MG TABLET
    1 Tablet ORAL DAILY
    Comments:
       Last Taken: 4/23/17
             Time: 4 pm
 
Ezetimibe (Zetia) 10 MG TAB
    1 Tablet ORAL DAILY
    Comments:
       LAST DOSE GIVEN ON 05/27/16 AT 9:15AM
 
Aspirin (Children's Aspirin) 81 MG TAB.CHEW
    1 Tablet ORAL DAILY
    Comments:
       Last Taken: 4/24/17
             Time: 11 am
 
Alprazolam (Alprazolam) 0.5 MG TABLET
    1 Tablet ORAL TWICE DAILY
    Comments:
       Last Taken: 4/24/17
             Time: 11 am
 
Mometasone/Formoterol (Dulera 200 Mcg/5 Mcg Inhaler) 13 GM HFA.AER.AD
    2 Puff Inhale through mouth TWICE DAILY
    Comments:
       NOT GIVEN IN HOSPITAL
 
Amiodarone HCl (Amiodarone HCl) 200 MG TABLET
    1 Tablet ORAL DAILY
    Comments:
       Not Given In Hospital
 
Diphenhydramine HCl (Benadryl) 25 MG CAPSULE
    1 Capsule ORAL DAILY AS NEEDED
    Comments:
       Last Taken: 4/23/17
             Time: 10 am
 
Metoprolol Succ XL (Toprol Xl) 50 MG TAB.ER.24H
    1 Tablet ORAL DAILY
    Comments:
       Last Taken: 4/24/17
             Time: 11 am
 
Cholecalciferol (Vitamin D3) (Vitamin D) 1,000 UNIT TABLET
    3 Tablet ORAL DAILY
    Comments:
       Not Given In Hospital
 
Albuterol Sulfate (Proair Respiclick) 90 MCG AER.POW.BA
    2 PUFF Inhale through mouth EVERY 8 HOURS AS NEEDED as needed for COPD
    Comments:
       Not given in hospital
 
Sennosides/Docusate Sodium (Senna-Docusate Sodium Tablet) 1 EACH TABLET
    1 Tablet ORAL TWICE DAILY as needed for CONSTIPATION
    Comments:
       Last Taken: 4/23/17
             Time: 9:30 am
 
Mirtazapine (Mirtazapine) 15 MG TABLET
    0.5 Tablet ORAL Every night
    Qty = 15
    Comments:
       Last Taken: 4/23/17
             Time: 9:30 pm
 
Megestrol Acetate (Megace) 400 MG/10 ML (40 MG/ML) ORAL.SUSP
    20 Milliliters ORAL DAILY
    Qty = 300
    Comments:
       Not Given in hospital
 
Amiodarone HCl (Pacerone) 100 MG TABLET
    100 Milligram ORAL Every other day
    Comments:
       Last Taken: 4/23/17
             Time: 10 am
 
Fluticasone/Vilanterol (Breo Ellipta 100-25 Mcg INH) 100 MCG-25 MCG/DOSE 
BLST.W.DEV
    1 PUFF Inhale through mouth DAILY
    Comments:
       Last Taken: 4/24/17
             Time: 11 am
 
Umeclidinium Bromide (Incruse Ellipta) 62.5 MCG/ACTUATION BLST.W.DEV
    1 PUFF Inhale through mouth DAILY
    Comments:
       Not given in hospital
 
Levothyroxine Sodium (Levo-T) 25 MCG TABLET
    25 Microgram ORAL DAILY
    Comments:
       Last Taken: 4/24/17
             Time: 6 am
 
Cholecalciferol (Vitamin D3) (Vitamin D) 1,000 UNIT TABLET
    3,000 Units ORAL DAILY
    Comments:
       Not Given In Hospital
 
Ezetimibe (Zetia) 10 MG TABLET
    1 Tablet ORAL DAILY
    Comments:
       Last Taken: 4/24/17
             Time: 11 am
 
 
Copies To:
ROSE NEWBERRY,MAYA VICTORIA MD,PERRY RODARTE
 
Attending MD Review Statement
Documenting Attending:
BETI STACK MD

## 2018-06-07 LAB
ERYTHROCYTE [DISTWIDTH] IN BLOOD BY AUTOMATED COUNT: 19.7 % (ref 11.5–14.5)
HCT VFR BLD CALC: 24.6 % (ref 37–47)
MCH RBC QN AUTO: 25.6 PG (ref 27–31)
MCHC RBC AUTO-ENTMCNC: 31.2 G/DL (ref 33–37)
MCV RBC AUTO: 82.3 FL (ref 81–99)
PLATELET # BLD: 321 /CUMM (ref 130–400)
PMV BLD AUTO: 8.3 FL (ref 7.4–10.4)
RED BLOOD CELL CT: 3 /CUMM (ref 4.2–5.4)
WBC # BLD AUTO: 10.1 /CUMM (ref 4.8–10.8)

## 2018-06-10 ENCOUNTER — HOSPITAL ENCOUNTER (INPATIENT)
Dept: HOSPITAL 68 - ERH | Age: 68
LOS: 4 days | Discharge: NURSING, REHAB OR LONG-TERM CARE | DRG: 191 | End: 2018-06-14
Attending: INTERNAL MEDICINE | Admitting: INTERNAL MEDICINE
Payer: COMMERCIAL

## 2018-06-10 VITALS — HEIGHT: 60 IN | BODY MASS INDEX: 25.4 KG/M2 | WEIGHT: 129.38 LBS

## 2018-06-10 VITALS — SYSTOLIC BLOOD PRESSURE: 134 MMHG | DIASTOLIC BLOOD PRESSURE: 64 MMHG

## 2018-06-10 VITALS — SYSTOLIC BLOOD PRESSURE: 104 MMHG | DIASTOLIC BLOOD PRESSURE: 58 MMHG

## 2018-06-10 DIAGNOSIS — E87.5: ICD-10-CM

## 2018-06-10 DIAGNOSIS — F41.9: ICD-10-CM

## 2018-06-10 DIAGNOSIS — Z95.5: ICD-10-CM

## 2018-06-10 DIAGNOSIS — J44.1: Primary | ICD-10-CM

## 2018-06-10 DIAGNOSIS — Z92.3: ICD-10-CM

## 2018-06-10 DIAGNOSIS — J84.10: ICD-10-CM

## 2018-06-10 DIAGNOSIS — D62: ICD-10-CM

## 2018-06-10 DIAGNOSIS — N18.9: ICD-10-CM

## 2018-06-10 DIAGNOSIS — C34.12: ICD-10-CM

## 2018-06-10 DIAGNOSIS — Z99.81: ICD-10-CM

## 2018-06-10 DIAGNOSIS — I25.10: ICD-10-CM

## 2018-06-10 DIAGNOSIS — J84.9: ICD-10-CM

## 2018-06-10 DIAGNOSIS — I25.2: ICD-10-CM

## 2018-06-10 DIAGNOSIS — E78.5: ICD-10-CM

## 2018-06-10 DIAGNOSIS — Z66: ICD-10-CM

## 2018-06-10 DIAGNOSIS — I12.9: ICD-10-CM

## 2018-06-10 LAB
APTT BLD: 29 SEC (ref 25–37)
ERYTHROCYTE [DISTWIDTH] IN BLOOD BY AUTOMATED COUNT: 19.7 % (ref 11.5–14.5)
HCT VFR BLD CALC: 25.1 % (ref 37–47)
MCH RBC QN AUTO: 25.6 PG (ref 27–31)
MCHC RBC AUTO-ENTMCNC: 31.3 G/DL (ref 33–37)
MCV RBC AUTO: 81.7 FL (ref 81–99)
PLATELET # BLD: 353 /CUMM (ref 130–400)
PMV BLD AUTO: 8.6 FL (ref 7.4–10.4)
PROTHROMBIN TIME: 12 SEC (ref 9.4–12.5)
RED BLOOD CELL CT: 3.08 /CUMM (ref 4.2–5.4)
WBC # BLD AUTO: 11.2 /CUMM (ref 4.8–10.8)

## 2018-06-10 PROCEDURE — P9016 RBC LEUKOCYTES REDUCED: HCPCS

## 2018-06-10 NOTE — PN- ATT ADDEND
Attending Addendum
Attending Brief Note
Patient seen and examined.  Plan of care discussed with the medical team and the
patient.  Available lab work and radiology test reports were reviewed.  In 
summary this is a 68-year-old female history of coronary artery disease, 
hypertension, lung cancer, pulmonary fibrosis, anemia, chronic kidney disease 
was chronically on home O2 presents with 3 days history of worsening difficulty 
breathing and increasing cough without any sputum production.  Patient denies 
any recent fever or chills.  In the ED she also had right-sided jaw pain.  She 
intermittently has been having chest pressure which apparently has been ascribed
to her angina.  She follows with Dr. Corcoran for pulmonary, Dr. Villela for 
cardiology and Dr. Barnhart for primary care.  She also follows with Dr. Portillo. 
Apparently she was recently diagnosed with  a left lingular pericardiac lung 
tumor and she is planning to undergo a lung biopsy this coming week.
   
Allergies
Coded Allergies:
No Known Allergies (05/25/16)
 
Reconcile Medications
Albuterol Sulfate (Proair Respiclick) 90 MCG AER.POW.BA   2 PUFF INH Q8P PRN 
COPD  (Reported)
Alprazolam 0.5 MG TABLET   1 TAB PO BID ANXIETY  (Reported)
Amiodarone HCl (Pacerone) 100 MG TABLET   100 MG PO EOD AFIB  (Reported)
Aspirin (Children's Aspirin) 81 MG TAB.CHEW   1 TAB PO DAILY HEART HEALTH  (
Reported)
Cholecalciferol (Vitamin D3) (Vitamin D3) 1,000 UNIT CAPSULE   1 CAP PO DAILY 
SUPPLEMENT  (Reported)
diphenhydrAMINE HCl (Benadryl) 25 MG CAPSULE   1 CAP PO QHS PRN SLEEP  (Reported
)
Epoetin Hermelindo (Epogen) (Unknown Strength) VIAL   (Unknown Dose) SC [id number] 
SUPPLEMENT  (Reported)
     Q 3 WEEKS, LAST TAKEN MONDAY 6/4/18)
Ezetimibe (Zetia) 10 MG TABLET   1 TAB PO DAILY CHOLESTEROL  (Reported)
Fluticasone/Vilanterol (Breo Ellipta 100-25 Mcg INH) 100 MCG-25 MCG/DOSE 
BLST.W.DEV   1 PUFF INH DAILY COPD  (Reported)
Furosemide 20 MG TABLET   20 MG PO EOD HEART  (Reported)
Levothyroxine Sodium 50 MCG TABLET   1 TAB PO DAILY THYROID  (Reported)
Metoprolol Succ XL (Toprol Xl) 50 MG TAB.ER.24H   1 TAB PO DAILY AFIB  (Reported
)
Mirtazapine 15 MG TABLET   1 TAB PO QPM PRN SLEEP  (Reported)
Omeprazole 40 MG CAPSULE.DR   1 TAB PO DAILY HEARTBURN  (Reported)
Ranolazine (Ranexa) 500 MG TAB.ER.12H   1,000 MG PO BID ANGINA  (Reported)
Rosuvastatin Calcium (Crestor) 10 MG TABLET   1 TAB PO DAILY HEART HEALTH  (
Reported)
Sennosides (Senna) 8.6 MG TABLET   8.6 MG PO PRN BOWELS  (Reported)
Umeclidinium Bromide (Incruse Ellipta) 62.5 MCG/ACTUATION BLST.W.DEV   1 PUFF 
INH DAILY COPD  (Reported)
 
Medical History
Neurological: NONE
EENT: NONE
Cardiovascular: CAD, hypertension, hyperlipidemia, myocardial infarction, STENTS
Respiratory: pneumonia, pulmonary fibrosis, LUNG CA ILD
Gastrointestinal: peptic ulcer disease
Hepatic: NONE
Renal: chronic kidney disease
Musculoskeletal: disk herniation
Psychiatric: NONE, anxiety
Endocrine: NONE
Blood Disorders: anemia
Cancer(s): lung cancer
History of MRSA: No
History of VRE: No
History of CDIFF: No
Influenza Vaccine: 10/01/16
 
Surgical History
Surgical History: STENTS (5) HYSTERECTOMY,LAMINECTOMY
 
Psychosocial History
Who do you live with Patient/Self
Services at Home Nursing, Physical Therapy
What is your primary language English
 
Family History
Family History, If Any:
MOTHER (Leukemia).
FATHER (Heart Attack).
SISTER (ovarian cancer).
BROTHER (Diabetes).
 
Vital Signs
 
 
Date Time Temp Pulse Resp B/P B/P Pulse O2 O2 Flow FiO2
 
     Mean Ox Delivery Rate 
 
06/10 1424      91 Nasal 5.0L 
 
       Cannula  
 
06/10 1336      86 Nasal 5.0L 
 
       Cannula  
 
06/10 1308 97.9 75 22 180/70  86 Nasal 5.0L 
 
       Cannula  
 
 
Exam:
General: Patient awake alert oriented who is slightly tachypnea But without any 
distress 
CVS: S1 plus S2 without any murmur or gallops 
Chest: Overall decreased air entry bilaterally, Few scattered crepitation with 
expiratory prolongation but without  wheeze.  There is no respiratory distress. 
Abdomen: Soft non-tender, bowel sound present, no guarding or rebound 
CNS: Awake alert oriented without any focal neuro deficit and follows commands  
appropriately 
Extremities: No edema; no clubbing or cyanosis noted 
 
 Laboratory Tests
 
 
 06/10 06/10
 
 1445 1340
 
Chemistry  
 
  Sodium (137 - 145 mmol/L)  143
 
  Potassium (3.5 - 5.1 mmol/L)  5.7  H
 
  Chloride (98 - 107 mmol/L)  107
 
  Carbon Dioxide (22 - 30 mmol/L)  22
 
  Anion Gap (5 - 16)  14
 
  BUN (7 - 17 mg/dL)  26  H
 
  Creatinine (0.5 - 1.0 mg/dL)  1.9  H
 
  Estimated GFR (>60 ml/min)  26  L
 
  BUN/Creatinine Ratio (7 - 25 %)  13.7
 
  Glucose (65 - 99 mg/dL)  101  H
 
  Lactic Acid (0.7 - 2.1 mmol/L)  1.7
 
  Calcium (8.4 - 10.2 mg/dL)  9.6
 
  Magnesium (1.6 - 2.3 mg/dL)  1.8
 
  Total Bilirubin (0.2 - 1.3 mg/dL)  0.5
 
  AST (14 - 36 U/L)  36
 
  ALT (9 - 52 U/L)  23
 
  Alkaline Phosphatase (<127 U/L)  143  H
 
  Troponin I (< 0.11 ng/ml)  < 0.01
 
  Pro-B-Natriuretic Pept (<125 pg/mL)  4570  H
 
  Total Protein (6.3 - 8.2 g/dL)  7.2
 
  Albumin (3.5 - 5.0 g/dL)  4.0
 
  Globulin (1.9 - 4.2 gm/dL)  3.2
 
  Albumin/Globulin Ratio (1.1 - 2.2 %)  1.3
 
Coagulation  
 
  PT (9.4 - 12.5 SEC)  12.0
 
  INR (0.90 - 1.19)  1.10
 
  APTT (25 - 37 SEC)  29
 
Hematology  
 
  CBC w Diff  MAN DIFF ORDERED
 
  WBC (4.8 - 10.8 /CUMM)  11.2  H
 
  RBC (4.20 - 5.40 /CUMM)  3.08  L
 
  Hgb (12.0 - 16.0 G/DL)  7.9  L
 
  Hct (37 - 47 %)  25.1  L
 
  MCV (81.0 - 99.0 FL)  81.7
 
  MCH (27.0 - 31.0 PG)  25.6  L
 
  MCHC (33.0 - 37.0 G/DL)  31.3  L
 
  RDW (11.5 - 14.5 %)  19.7  H
 
  Plt Count (130 - 400 /CUMM)  353
 
  MPV (7.4 - 10.4 FL)  8.6
 
  Segmented Neutrophils (42.2 - 75.2 %)  87  H
 
  Lymphocytes (20.5 - 51.1 %)  2  L
 
  Monocytes (1.7 - 9.3 %)  9
 
  Eosinophils (0 - 5.0 %)  2
 
  Nucleated RBCs (0.0 - 0.0 /100WBC)  1  H
 
  Polychromasia  1+
 
  Hypochromic-Microcytic  2+
 
  Poikilocytosis  1+
 
  Anisocytosis  2+
 
Urines  
 
  Urine Color (YEL,AMB,STR) YEL 
 
  Urine Clarity (CLEAR) CLEAR 
 
  Urine pH (5.0 - 8.0) 7.0 
 
  Ur Specific Gravity (1.001 - 1.035) 1.010 
 
  Urine Protein (NEG,<30 MG/DL) NEG 
 
  Urine Ketones (NEG) NEG 
 
  Urine Nitrite (NEG) NEG 
 
  Urine Bilirubin (NEG) NEG 
 
  Urine Urobilinogen (0.1  -  1.0 EU/dl) 0.2 
 
  Ur Leukocyte Esterase (NEG) NEG 
 
  Ur Microscopic EXAM NOT REQUIRED 
 
  Urine Hemoglobin (NEG) NEG 
 
  Urine Glucose (N MG/DL) NEG 
 
 
CT chest
Diffuse centrilobular and paraseptal emphysema with some chronic interstitial
lung disease. Interval increase in interstitial disease which may be related
to interstitial edema, atypical or viral pneumonitis, or be drug-related.
Interval enlargement of lingular mass.
 
cxr
Bibasilar airspace opacities, right greater than left with possible small
bilateral pleural effusions. These opacities are nonspecific and may
represent atelectasis or pneumonia.
 
Mild engorgement of the pulmonary vasculature suggesting volume overload.
 
Prominent airspace opacities in the right middle and right upper lobe. These
may represent sequela of mild volume overload or possibly developing foci of
multifocal pneumonia.
 
 
Assessment and problem list
* Difficulty breathing - definition include gradual worsening of her underlying 
pulmonary fibrosis, COPD exacerbation or mild CHF.  No sign of active pneumonia 
is noted on CT scan
* History of COPD
* History of growing fibrosis
* History of CAD status post stent placement- the recurrent angina and jaw pain 
today 
* Chronic renal failure with worsening of creatinine to 2.5
* Lung mass and history of lung cancer status post CyberKnife- waiting for 
biopsy of the left sided tumor
* Chronic anemia likely related to her any disease
Plan
* Admit to telemetry
* Rule out MI protocol with troponin; repeat EKG; cardiology consult, continue 
Ranexa
* TRC nebulizer treatment and continue oxygen, obtain pulmonary  consult Dr. Corcoran tomorrow
* Patient was given a dose of Lasix in ED- at this point I will continue to 
observe patient, since she is not actively wheezing I would avoid giving 
steroids at this point
* Patient also fever she can be started on broad-spectrum antibiotics
* Patient is requesting Xanax 0.5 mg and Benadryl 50 mg at night for sleep

## 2018-06-10 NOTE — ADMISSION CERTIFICATION
Admission Certification
 
Certification Statement
- As attending physician, I certify that at the time of
- admission, based on clinical presentation, severity of
- symptoms, need for further diagnostic testing and
- therapeutic interventions, and risk of adverse outcomes
- without in-hospital treatment, in my clinical assessment,
- this patient requires an acute hospital stay for a minimum
- of two nights or longer. I have also considered psychsocial
- factors such as support system, advanced age, financial
- issues, cognitive issues, and failed out-patient treatments,
- past re-admission history, safety of patient, and lack of
- compliance as applicable.
Specific rationale supporting this admission is:
Difficulty breathing, possible COPD exacerbation, worsening renal failure, and 
recurrent chest and jaw pain

## 2018-06-10 NOTE — RADIOLOGY REPORT
EXAMINATION:
XR PORTABLE CHEST
 
CLINICAL INFORMATION:
Shortness of breath and hypoxia.
 
COMPARISON:
Chest x-ray 12/20/2017. CT chest 5/3/2018.
 
TECHNIQUE:
Portable frontal view of the chest was obtained.
 
FINDINGS:
Mild bibasilar airspace opacities, right greater than left. There is blunting
of both costophrenic angles.
 
Heart and mediastinal silhouette is within normal limits. Mild engorgement of
the pulmonary vasculature.
 
Hazy opacities are also noted in the right middle and right upper lobe.
 
No acute osseous finding.
 
IMPRESSION:
Bibasilar airspace opacities, right greater than left with possible small
bilateral pleural effusions. These opacities are nonspecific and may
represent atelectasis or pneumonia.
 
Mild engorgement of the pulmonary vasculature suggesting volume overload.
 
Prominent airspace opacities in the right middle and right upper lobe. These
may represent sequela of mild volume overload or possibly developing foci of
multifocal pneumonia.

## 2018-06-10 NOTE — HISTORY & PHYSICAL
Schwaber MD,Eric 06/10/18 3572:
General Information and HPI
History of Present Illness:
Ms. Landers is a 68-year-old female with past medical history of coronary 
artery disease status post MI with multiple stents followed by Dr. Castrejon, 
hypertension, hyperlipidemia, squamous cell lung cancer status post gamma knife 
followed by Dr. Corcoran, interstitial lung disease on 3 L oxygen, chronic kidney 
disease followed by Dr. Chacon, paroxysmal atrial fibrillation not on 
anticoagulation due to a GI bleed and anxiety who presents with shortness of 
breath.  The patient says that for the past 3-4 days she has had progressive 
shortness of breath and weakness that is especially worse on exertion.  She has 
measured her saturation and has been in the 60s.  She additionally complains of 
some chest pain that radiates the shoulder and jaw.  Because of the severity of 
her symptoms, she activated her life alert and was brought to the emergency 
room.  She additionally reports occasional chills, nonproductive cough, 
constipation, and right leg pain.  She currently does not have an oncologist and
she had gamma knife several years ago but never had chemotherapy for her lung 
cancer.  She was scheduled to have a left lung biopsy for a new mass on Tuesday.
 She denies any fever, sweats, abdominal pain, nausea, vomiting, diarrhea, 
dysuria, rash, melena, hematochezia, previous blood clots.  She is a former 
smoker denies alcohol or recreational drug use.
 
Allergies/Medications
Allergies:
Coded Allergies:
No Known Allergies (05/25/16)
 
Home Med list
Albuterol Sulfate (Proair Respiclick) 90 MCG AER.POW.BA   2 PUFF INH Q8P PRN 
COPD  (Reported)
Alprazolam 0.5 MG TABLET   1 TAB PO BID ANXIETY  (Reported)
Amiodarone HCl (Pacerone) 100 MG TABLET   100 MG PO EOD AFIB  (Reported)
Aspirin (Children's Aspirin) 81 MG TAB.CHEW   1 TAB PO DAILY HEART HEALTH  (
Reported)
Cholecalciferol (Vitamin D3) (Vitamin D3) 1,000 UNIT CAPSULE   1 CAP PO DAILY 
SUPPLEMENT  (Reported)
diphenhydrAMINE HCl (Benadryl) 25 MG CAPSULE   1 CAP PO QHS PRN SLEEP  (Reported
)
Epoetin Hermelindo (Epogen) (Unknown Strength) VIAL   (Unknown Dose) SC [id number] 
SUPPLEMENT  (Reported)
     Q 3 WEEKS, LAST TAKEN MONDAY 6/4/18)
Ezetimibe (Zetia) 10 MG TABLET   1 TAB PO DAILY CHOLESTEROL  (Reported)
Fluticasone/Vilanterol (Breo Ellipta 100-25 Mcg INH) 100 MCG-25 MCG/DOSE 
BLST.W.DEV   1 PUFF INH DAILY COPD  (Reported)
Furosemide 20 MG TABLET   20 MG PO EOD HEART  (Reported)
Levothyroxine Sodium 50 MCG TABLET   1 TAB PO DAILY THYROID  (Reported)
Metoprolol Succ XL (Toprol Xl) 50 MG TAB.ER.24H   1 TAB PO DAILY AFIB  (Reported
)
Mirtazapine 15 MG TABLET   1 TAB PO QPM PRN SLEEP  (Reported)
Omeprazole 40 MG CAPSULE.DR   1 TAB PO DAILY HEARTBURN  (Reported)
Ranolazine (Ranexa) 500 MG TAB.ER.12H   1,000 MG PO BID ANGINA  (Reported)
Rosuvastatin Calcium (Crestor) 10 MG TABLET   1 TAB PO DAILY HEART HEALTH  (
Reported)
Sennosides (Senna) 8.6 MG TABLET   8.6 MG PO PRN BOWELS  (Reported)
Umeclidinium Bromide (Incruse Ellipta) 62.5 MCG/ACTUATION BLST.W.DEV   1 PUFF 
INH DAILY COPD  (Reported)
 
 
Past History
 
Travel History
Traveled to [place] past 21 day No
 
Medical History
Neurological: NONE
EENT: NONE
Cardiovascular: CAD, hypertension, hyperlipidemia, myocardial infarction, STENTS
Respiratory: pneumonia, pulmonary fibrosis, LUNG CA ILD
Gastrointestinal: peptic ulcer disease
Hepatic: NONE
Renal: chronic kidney disease
Musculoskeletal: disk herniation
Psychiatric: NONE, anxiety
Endocrine: NONE
Blood Disorders: anemia
Cancer(s): lung cancer
History of MRSA: No
History of VRE: No
History of CDIFF: No
Influenza Vaccine: 10/01/16
 
Surgical History
Surgical History: STENTS (5) HYSTERECTOMY,LAMINECTOMY
 
Past Family/Social History
 
Family History
Relations & Conditions if any
MOTHER (Leukemia).
FATHER (Heart Attack).
SISTER (ovarian cancer).
BROTHER (Diabetes).
 
 
Psychosocial History
Who Do You Live With? self
Services at Home: Nursing, Physical Therapy
Primary Language: English
ETOH Use: denies use
Illicit Drug Use: denies illicit drug use
 
Functional Ability
ADLs
Independent: dressing, eating, toileting, bathing. 
Ambulation: cane
IADLs
Independent: shopping, housework, finances, food prep, telephone, transportation
, medication admin. 
 
Review of Systems
 
Review of Systems
Constitutional:
Reports: no symptoms. 
EENTM:
Reports: no symptoms. 
Cardiovascular:
Reports: see HPI. 
Respiratory:
Reports: see HPI. 
GI:
Reports: no symptoms. 
Genitourinary:
Reports: no symptoms. 
Musculoskeletal:
Reports: no symptoms. 
Skin:
Reports: no symptoms. 
Neurological/Psychological:
Reports: no symptoms. 
Hematologic/Endocrine:
Reports: no symptoms. 
Immunologic/Allergic:
Reports: no symptoms. 
All Other Systems: Reviewed and Negative
 
Exam & Diagnostic Data
Last 24 Hrs of Vital Signs/I&O
 Vital Signs
 
 
Date Time Temp Pulse Resp B/P B/P Pulse O2 O2 Flow FiO2
 
     Mean Ox Delivery Rate 
 
06/10 1424      91 Nasal 5.0L 
 
       Cannula  
 
06/10 1336      86 Nasal 5.0L 
 
       Cannula  
 
06/10 1308 97.9 75 22 180/70  86 Nasal 5.0L 
 
       Cannula  
 
 
 Intake & Output
 
 
 06/10 1600 06/10 0800 06/10 0000
 
Intake Total   
 
Output Total 250  
 
Balance -250  
 
    
 
Output, Urine 250  
 
Patient 58.513 kg  
 
Weight   
 
Weight Reported by Patient  
 
Measurement   
 
Method   
 
 
 
 
Physical Exam
General Appearance Alert, Oriented X3, Cooperative, No Acute Distress
Cardiovascular Regular Rate, Normal S1, Normal S2
Lungs crackles
Abdomen Normal Bowel Sounds, Soft, No Tenderness
Extremities No Edema, Normal Pulses, No Tenderness/Swelling
Last 24 Hrs of Labs/Stephon:
 Laboratory Tests
 
06/10/18 1445:
Urine Color YEL, Urine Clarity CLEAR, Urine pH 7.0, Ur Specific Gravity 1.010, 
Urine Protein NEG, Urine Ketones NEG, Urine Nitrite NEG, Urine Bilirubin NEG, 
Urine Urobilinogen 0.2, Ur Leukocyte Esterase NEG, Ur Microscopic EXAM NOT 
REQUIRED, Urine Hemoglobin NEG, Urine Glucose NEG
 
06/10/18 1340:
Anion Gap 14, Estimated GFR 26  L, BUN/Creatinine Ratio 13.7, Glucose 101  H, 
Lactic Acid 1.7, Calcium 9.6, Magnesium 1.8, Total Bilirubin 0.5, AST 36, ALT 23
, Alkaline Phosphatase 143  H, Troponin I < 0.01, Pro-B-Natriuretic Pept 4570  H
, Total Protein 7.2, Albumin 4.0, Globulin 3.2, Albumin/Globulin Ratio 1.3, PT 
12.0, INR 1.10, APTT 29, CBC w Diff MAN DIFF ORDERED, RBC 3.08  L, MCV 81.7, MCH
25.6  L, MCHC 31.3  L, RDW 19.7  H, MPV 8.6, Segmented Neutrophils 87  H, 
Lymphocytes 2  L, Monocytes 9, Eosinophils 2, Nucleated RBCs 1  H, Polychromasia
1+, Hypochromic-Microcytic 2+, Poikilocytosis 1+, Anisocytosis 2+
 
 
Assessment/Plan
Assessment:
Ms. Landers is a 68-year-old female with past medical history of coronary 
artery disease status post MI with multiple stents followed by Dr. Castrejon, 
hypertension, hyperlipidemia, squamous cell lung cancer status post gamma knife 
followed by Dr. Corcoran, interstitial lung disease on 3 L oxygen, chronic kidney 
disease followed by Dr. Chacon, paroxysmal atrial fibrillation not on 
anticoagulation due to a GI bleed and anxiety who presents with shortness of 
breath.  
 
On presentation, vital signs were T 97.9, HR 75, RR 22, /70, saturating 86
% on 5 L nasal cannula.  Laboratories were significant for white blood cell 
count of 1.2, hemoglobin 7.9, MCV 81.7, potassium 5.7, BUN 26, creatinine 1.9 (
baseline 2.2), lactic acid 1.7, calcium 9.6, negative LFTs, BNP 4570.  
Urinalysis was negative.  Chest x-ray shows bibasilar airspace opacities right 
greater than left with small bilateral pleural effusions and mild engorgement of
the pulmonary vasculature suggesting volume overload.  CT chest without contrast
shows diffuse central lobar and paraseptal emphysema with some tonic 
interstitial lung disease, interval increase in interstitial disease, and 
interval enlargement of the lingular mass.
 
She will be admitted to telemetry and treated for the following problems:
1.  Dyspnea with extensive pulmonary disease history
2.  Chest pain syndrome
3.  Hyperkalemia
4.  Normocytic anemia
 
#Dyspnea with extensive pulmonary disease history: Patient has an extensive long
history including lung cancer and interstitial lung disease now presenting with 
acutely worsening dyspnea.  Differential would include CHF versus amiodarone 
induced toxicity versus pulmonary embolism versus pneumonia.  CT imaging is 
nonspecific and suggests multiple possible etiologies.
-EKG and troponins 3
-Cardiology consult
-Daily weights, strict I's and O's
-Pulmonology consult
-Continue ranolazine
-Consider further diuresis
-Ultrasound right lower extremity
 
#Hyperkalemia: Patient has hyperkalemia that is mild without peak T waves.
-Kayexalate was given in the emergency room
-Continue to monitor
 
#Normocytic anemia: Patient has a chronic anemia secondary to her chronic kidney
disease for which he receives Epogen.  She also has history of GI bleed.  She is
not complaining of any hematochezia or melena recently.
-Continue Epogen
-Continue to monitor
 
#Chronic medical problems:
Continue other home medications
 
DVT prophylaxis with heparin
CHF diet
DNR/okay to intubate
 
As Ranked By This Provider
Problem List:
 1. Dyspnea
 
 
Core Measures/Misc (9/17)
 
Acute Coronary Syndrome
ACS Diagnosis: No
 
Congestive Heart Failure
Congestive Heart Failure Diagnosis Yes
 
Cerebrovascular Accident
CVA/TIA Diagnosis: No
 
VTE (View Protocol)
VTE Risk Factors Age>40
No Mechanical VTE Prophylaxis d/t N/A MechProphylax Ordered
No VTE Pharm Prophylaxis d/t NA PharmProphylax ordered
 
Sepsis (View protocol)
Sepsis Present: No
If YES complete Sepsis Event Note If YES complete Sepsis Event Note
 
 
Edouard Ferguson 06/10/18 1804:
Core Measures/Misc (9/17)
 
Sepsis (View protocol)
If YES complete Sepsis Event Note If YES complete Sepsis Event Note
 
Resident Review Statement
Resident Statement: examined this patient, discussed with intern, agreed with 
intern, discussed with family, reviewed EMR data (avail), discussed with nursing
, discussed with case mgmt, reviewed images, amended to note
Other Findings:
68-year-old woman with past medical history of coronary artery disease status 
post PCI (2001, 2014) as well as STEMI to the RCA 4/20/2016, history of COPD/
interstitial lung disease/lung cancer, paroxysmal atrial fibrillation not on any
anticoagulation due to history of GI bleed, chronic anemia, chronic kidney 
disease due to hypertensive nephrosclerosis presented to [facility] ED 
with chief complaint of progressive shortness of breath, hypoxemia to 70s on 
pulse oximeter, chest pain with radiation to jaw, with no obvious reproducible 
component, 2 day history of orthopnea (sleeping in a recliner) but no PND or 
lower extremity leg swelling.  She did endorse to two-week history of right leg 
intermittent sharp pain, and some swelling.
She received CAT scan in the ED, which shows diffuse centrilobular and 
paraseptal emphysema with some chronic interstitial lung disease.  The 
interstitial disease shows interval increase, which could be secondary to 
interstitial edema (exam largely negative for signs of fluid retention), 
atypical or viral pneumonitis (no recent sick contacts or upper respiratory 
symptoms) or drug related (patient has been on amiodarone for a long period of 
time) and exam reveals a Velcro like coarse breath sounds in the right mid and 
lower lung fields.
Her EKG is quite similar to the baseline from 2017, but sinus rhythm and left 
anterior fascicular block.
The patient will be admitted to telemetry, serial troponins and EKG to rule out 
ACS.  Her aspirin is on hold due to anticipated lung biopsy on Tuesday.  Her 
previous echo from May 2018 has shown normal LVEF but anteroseptal hypokinesis.
Continue metoprolol, statin and Ranexa for chronic angina.
For her chronic obstructive lung disease, she should get TRC nebs and inhalers 
as prescribed.  Consult with Dr. Corcoran in the morning.
She is low-moderate risk by Nathan.  To be taken into consideration, that she is 
beta blocked and may inappropriately have normal heart rate.  Please obtain 
ultrasound of bilateral lower extremities.  If her hypoxemia and shortness of 
breath continues to progress-she may need a VQ scan.
Her ILD is progressing, especially while on amiodarone, and may need to be 
addressed by cardiology.  Especially, when her progressing ILD cannot be 
accounted for by interstitial edema (euvolemic on exam).  Pneumonitis-viral or 
atypical may perhaps be contributing to the CT findings, check rapid flu. 
For her anemia, this is likely secondary to CKD and she gets Epogen.  Please 
check Hemoccult.  Type and cross, she has a history of coronary artery disease 
and a goal level of greater than 8 hemoglobin should be maintained.  Check CBC 
in the morning.
Hyperkalemia.  Likely secondary to CKD, received Kayexalate in the ED.  Will 
improve with beta agonist administration with TRC.
 
DO NOT RESUSCITATE, but okay to intubate.
Heart healthy diet.
Heparin subcu for DVT prophylaxis.

## 2018-06-10 NOTE — ED DYSPNEA/ASTHMA COMPLAINT
History of Present Illness
 
General
Chief Complaint: General Adult
Stated Complaint: BIBA NEAR SYNCOPE, SOB
Source: patient, old records, EMS
Exam Limitations: no limitations
 
Vital Signs & Intake/Output
Vital Signs & Intake/Output
 Vital Signs
 
 
Date Time Temp Pulse Resp B/P B/P Pulse O2 O2 Flow FiO2
 
     Mean Ox Delivery Rate 
 
06/10 1735 97.4  28      
 
06/10 1731  88 24 140/68  92 Nasal 5.0L 
 
       Cannula  
 
06/10 1424      91 Nasal 5.0L 
 
       Cannula  
 
06/10 1336      86 Nasal 5.0L 
 
       Cannula  
 
06/10 1308 97.9 75 22 180/70  86 Nasal 5.0L 
 
       Cannula  
 
 
 
Allergies
Coded Allergies:
No Known Allergies (16)
 
Reconcile Medications
Albuterol Sulfate (Proair Respiclick) 90 MCG AER.POW.BA   2 PUFF INH Q8P PRN 
COPD  (Reported)
Alprazolam 0.5 MG TABLET   1 TAB PO BID ANXIETY  (Reported)
Amiodarone HCl (Pacerone) 100 MG TABLET   100 MG PO EOD AFIB  (Reported)
Aspirin (Children's Aspirin) 81 MG TAB.CHEW   1 TAB PO DAILY HEART HEALTH  (
Reported)
Cholecalciferol (Vitamin D3) (Vitamin D3) 1,000 UNIT CAPSULE   1 CAP PO DAILY 
SUPPLEMENT  (Reported)
diphenhydrAMINE HCl (Benadryl) 25 MG CAPSULE   1 CAP PO QHS PRN SLEEP  (Reported
)
Epoetin Hermelindo (Epogen) (Unknown Strength) VIAL   (Unknown Dose) SC [id number] 
SUPPLEMENT  (Reported)
     Q 3 WEEKS, LAST TAKEN 18)
Ezetimibe (Zetia) 10 MG TABLET   1 TAB PO DAILY CHOLESTEROL  (Reported)
Fluticasone/Vilanterol (Breo Ellipta 100-25 Mcg INH) 100 MCG-25 MCG/DOSE 
BLST.W.DEV   1 PUFF INH DAILY COPD  (Reported)
Furosemide 20 MG TABLET   20 MG PO EOD HEART  (Reported)
Levothyroxine Sodium 50 MCG TABLET   1 TAB PO DAILY THYROID  (Reported)
Metoprolol Succ XL (Toprol Xl) 50 MG TAB.ER.24H   1 TAB PO DAILY AFIB  (Reported
)
Mirtazapine 15 MG TABLET   1 TAB PO QPM PRN SLEEP  (Reported)
Omeprazole 40 MG CAPSULE.DR   1 TAB PO DAILY HEARTBURN  (Reported)
Ranolazine (Ranexa) 500 MG TAB.ER.12H   1,000 MG PO BID ANGINA  (Reported)
Rosuvastatin Calcium (Crestor) 10 MG TABLET   1 TAB PO DAILY HEART HEALTH  (
Reported)
Sennosides (Senna) 8.6 MG TABLET   8.6 MG PO PRN BOWELS  (Reported)
Umeclidinium Bromide (Incruse Ellipta) 62.5 MCG/ACTUATION BLST.W.DEV   1 PUFF 
INH DAILY COPD  (Reported)
 
Triage Nurses Notes Reviewed? yes
Onset: Abrupt
Duration: day(s): (3-4), changing over time, continues in ED, getting worse
Timing: recent history
Severity: moderate, severe
Activities at Onset: none
Prior Episodes/Possible Cause: frequent episodes
Modifying Factors:
Worsens With: movement. 
Associated Symptoms: chest pain, weakness
LMP (ages 10-50): unknown
Pregnant: No
Patient currently breastfeeds: No
HPI:
68-year-old female history of coronary artery disease, hypertension, lung cancer
, pulmonary fibrosis, anemia, chronic kidney disease.  Evaluation of shortness 
of breath and hypoxia.  Patient reports that over the past 3 days she is 
gradually worsening chest pain and shortness of breath on exertion.  She states 
that even walking short distances such as to the bathroom causes pain in the 
center of her chest that radiates into her jaw.  She describes this as pressure.
 She states she has tried taking her RANEXA which she usually uses for angina 
without any improvement.  She also noticed that her oxygen saturation has been 
in the 80s and 70s even on 3 L nasal cannula which is what she usually uses.  
She states that today she felt like she was going to pass out when trying to 
exert herself.  She reports associated weakness and lightheadedness.  She 
reports associated nausea but no vomiting.  No fever no coughing.  She has used 
inhalers without any improvement.  She denies any lower extremity edema, fever, 
back pain changes in vision, melena, bright red blood per rectum, abdominal 
pain. 
(Leonardo Dhaliwal)
 
Past History
 
Medical History
Any Pertinent Medical History? see below for history
Neurological: NONE
EENT: NONE
Cardiovascular: CAD, hypertension, hyperlipidemia, myocardial infarction, STENTS
Respiratory: pneumonia, pulmonary fibrosis, LUNG CA ILD
Gastrointestinal: peptic ulcer disease
Hepatic: NONE
Renal: chronic kidney disease
Musculoskeletal: disk herniation
Psychiatric: NONE, anxiety
Endocrine: NONE
Blood Disorders: anemia
Cancer(s): lung cancer
History of MRSA: No
History of VRE: No
History of CDIFF: No
Influenza Vaccine: 10/01/16
 
Surgical History
Surgical History: STENTS (5) HYSTERECTOMY,LAMINECTOMY
 
Psychosocial History
Who do you live with Patient/Self
Services at Home Nursing, Physical Therapy
What is your primary language English
 
Family History
Family History, If Any:
MOTHER (Leukemia).
FATHER (Heart Attack).
SISTER (ovarian cancer).
BROTHER (Diabetes).
 
Hx Contributory? No
(Leonardo Dhaliwal)
 
Review of Systems
 
Review of Systems
Constitutional:
Reports: weakness. 
EENTM:
Reports: no symptoms. 
Respiratory:
Reports: see HPI, cough, short of breath. 
Cardiovascular:
Reports: see HPI, chest pain. 
GI:
Reports: no symptoms. 
Genitourinary:
Reports: no symptoms. 
Musculoskeletal:
Reports: no symptoms. 
Skin:
Reports: no symptoms. 
Neurological/Psychological:
Reports: no symptoms. 
Hematologic/Endocrine:
Reports: no symptoms. 
Immunologic/Allergic:
Reports: no symptoms. 
All Other Systems: Reviewed and Negative
(Leonardo Dhaliwal)
 
Physical Exam
 
Physical Exam
General Appearance: well developed/nourished, no apparent distress, alert, awake
Head: atraumatic, normal appearance
Eyes:
Bilateral: normal appearance, PERRL, EOMI. 
Ears, Nose, Throat: hearing grossly normal
Neck: normal inspection, supple, full range of motion
Respiratory: chest non-tender, no respiratory distress, quiet respiration, 
decreased breath sounds
Cardiovascular: regular rate/rhythm, normal peripheral pulses
Peripheral Pulses:
2+ radial (R), 2+ radial (L)
Gastrointestinal: normal bowel sounds, soft, non-tender, no organomegaly
Extremities: normal inspection, normal range of motion, no edema
Neurologic/Psych: no motor/sensory deficits, awake, alert, oriented x 3
Skin: intact, warm/dry, pallor
Lymphatic: no anterior cervical brent
 
Core Measures
ACS in differential dx? Yes
CVA/TIA Diagnosis No
Sepsis Present: No
Sepsis Focused Exam Completed? No
(Leonardo Dhaliwal)
 
Progress
Differential Diagnosis: asthma, AMI, bronchitis, CHF, COPD, pericarditis, 
pulmonary embolism, pneumonia, rib fracture, unstable angina, interstitial lung 
disease, pulmonary fibrosis
Plan of Care:
 Orders
 
 
Procedure Date/time Status
 
 Active
 
Heart Healthy Diet  B Active
 
TROPONIN LEVEL 600 Active
 
CBC WITHOUT DIFFERENTIAL 600 Active
 
BASIC ELECTROLYTES PLUS BUN& Active
 
Renal Dialysis Diet 06/10 D Complete
 
TROPONIN LEVEL 06/10 2000 Active
 
EKG 06/10 2000 Active
 
Pathway - chart 06/10 1735 Active
 
Code Status 06/10 1735 Active
 
LACTIC ACID 06/10 1615 Active
 
Patient Data 06/10 1613 Active
 
ED Holding Orders 06/10 1608 Active
 
Admit to inpatient 06/10 1608 Active
 
Vital Signs 06/10 1608 Active
 
Code Status 06/10 1608 Complete
 
Intake & Output 06/10 1458 Active
 
Telemetry/Cardiac Monitor 06/10 1315 Active
 
URINALYSIS 06/10 1315 Complete
 
TROPONIN LEVEL 06/10 1315 Complete
 
PARTIAL THROMBOPLASTIN TIME 06/10 1315 Complete
 
PROTHROMBIN TIME 06/10 1315 Complete
 
MAGNESIUM 06/10 1315 Complete
 
LACTIC ACID 06/10 1315 Complete
 
COMPREHENSIVE METABOLIC PANEL 06/10 1315 Complete
 
CBC WITHOUT DIFFERENTIAL 06/10 1315 Complete
 
B-TYPE NATRIURETIC PEP (BNP) 06/10 1315 Complete
 
EKG 06/10 1315 Active
 
US-EXT BILAT VENOUS DOPPLER 06/10  UNK Active
 
TRC EVALUATION (GEN) 06/10  UNK Active
 
Pathway - chart 06/10  UNK Active
 
House Staff 06/10  UNK Active
 
Weight 06/10  UNK Active
 
VTE Mechanical Prophylaxis 06/10  UNK Active
 
Intake & Output 06/10  UNK Active
 
Activity/Ambulation 06/10  UNK Active
 
CASE MANAGEMENT CONSULT 06/10  UNK Active
 
 
 Current Medications
 
 
  Sig/Enrique Start time  Last
 
Medication Dose  Stop Time Status Admin
 
Atorvastatin Calcium 40 MG 1700  1700 UNVr 
 
(Lipitor)     
 
Cholecalciferol 3,000 IU DAILY  0900 UNVr 
 
(Vitamin D)     
 
Ezetimibe 10 MG DAILY  0900 UNVr 
 
(Zetia)     
 
Levothyroxine Sodium 0.05 MG DAILY  09 UNVr 
 
(Synthroid)     
 
Metoprolol Succinate 50 MG DAILY  09 UNVr 
 
(Toprol Xl)     
 
Omeprazole 40 MG DAILY AC  0700 UNVr 
 
(Prilosec)     
 
Heparin Sodium  5,000 UNIT Q8 06/10 2200 UNVr 
 
(Porcine)     
 
Alprazolam 0.5 MG BID 06/10 2100 UNVr 
 
(Xanax)   2059  
 
Budesonide/ 2 PUF BID 06/10 2100 UNVr 
 
Formoterol Fumarate     
 
(Symbicort)     
 
Ranolazine 1,000 MG BID 06/10 2100 UNVr 
 
(Ranexa)     
 
Albuterol Sulfate 2 PUF Q4 06/10 1800 UNVr 
 
(Ventolin)     
 
Amiodarone HCl 100 MG .[EOD] 06/10 1745 UNVr 
 
(Cordarone)     
 
Diphenhydramine HCl 25 MG .[QHS] PRN 06/10 1745 UNVr 
 
(Benadryl)     
 
Furosemide 20 MG .[EOD] 06/10 1745 UNVr 
 
(Lasix)     
 
Mirtazapine 15 MG QPM PRN 06/10 1745 UNVr 
 
(Remeron)     
 
Senna/Docusate Sodium 1 TAB DAILY PRN 06/10 1745 UNVr 
 
(Senokot S)     
 
Tiotropium Bromide 1 PUF DAILY 06/10 1739 UNVr 
 
(Spiriva)     
 
 
 Laboratory Tests
 
 
 
06/10/18 1445:
Urine Color YEL, Urine Clarity CLEAR, Urine pH 7.0, Ur Specific Gravity 1.010, 
Urine Protein NEG, Urine Ketones NEG, Urine Nitrite NEG, Urine Bilirubin NEG, 
Urine Urobilinogen 0.2, Ur Leukocyte Esterase NEG, Ur Microscopic EXAM NOT 
REQUIRED, Urine Hemoglobin NEG, Urine Glucose NEG
 
06/10/18 1340:
Anion Gap 14, Estimated GFR 26  L, BUN/Creatinine Ratio 13.7, Glucose 101  H, 
Lactic Acid 1.7, Calcium 9.6, Magnesium 1.8, Total Bilirubin 0.5, AST 36, ALT 23
, Alkaline Phosphatase 143  H, Troponin I < 0.01, Pro-B-Natriuretic Pept 4570  H
, Total Protein 7.2, Albumin 4.0, Globulin 3.2, Albumin/Globulin Ratio 1.3, PT 
12.0, INR 1.10, APTT 29, CBC w Diff MAN DIFF ORDERED, RBC 3.08  L, MCV 81.7, MCH
25.6  L, MCHC 31.3  L, RDW 19.7  H, MPV 8.6, Segmented Neutrophils 87  H, 
Lymphocytes 2  L, Monocytes 9, Eosinophils 2, Nucleated RBCs 1  H, Polychromasia
1+, Hypochromic-Microcytic 2+, Poikilocytosis 1+, Anisocytosis 2+
 
 
Patient seen and evaluated.  She is here with shortness of breath, chest pain 
and weakness.  Symptoms are worse on exertion and improves at rest.  Currently 
when she is just at rest she denies any symptoms however just with talking she 
desaturates.  She has increased oxygen demand requiring 6 L nasal cannula to 
maintain oxygen saturation in the low 90s.  She has no lower extremity edema or 
JVD.  Labs EKG chest x-ray ordered.  Patient was given a DuoNeb.
 
Blood work shows a mildly elevated white blood cell count 11.2.  Her anemia is 
stable for her at 7.9.  She denies melena or bright red blood per rectum.  
Potassium is 5.7.  Lasix and Kayexalate ordered.  No EKG changes.  Chest x-ray 
shows pulmonary edema versus infiltrate.  A chest CT was obtained for further 
evaluation.  Patient will require admission to the hospital.
 
Chest CT shows evidence of emphysema interstitial lung disease possibly atypical
pneumonitis versus pulmonary edema.  Patient will be admitted to telemetry for 
further evaluation and treatment case discussed with Dr. Garcia he agrees.
Diagnostic Imaging:
Viewed by Me: Radiology Read.  Discussed w/RAD: Radiology Read. 
Radiology Impression: PATIENT: CHRIS ELDRIDGE  MEDICAL RECORD NO: [id number] 
PRESENT AGE: 68  PATIENT ACCOUNT NO: [id number] : 48  LOCATION: [facility] 
ORDERING PHYSICIAN: Leonardo ARMSTRONG     SERVICE DATE: 06/10/ EXAM TYPE: CAT
- CT CHEST WO IV CONTRAST EXAMINATION: CT CHEST WITHOUT CONTRAST CLINICAL 
INFORMATION: Shortness of breath. Hypoxia. COMPARISON: May 3, 2018 and 2018 TECHNIQUE: Multidetector volumetric CT imaging of the chest was done. 
Axial MIP volume rendering provided. Sagittal and coronal reformatted images 
were obtained. DLP: 211.66 mGy-cm FINDINGS: LUNGS: There are significant changes
of paraseptal and centrilobular emphysema again seen bilaterally. There are also
noted to be regions of reticulation and interstitial fibrosis in all lobes. 
There is an increase in the interstitial prominence suggestive of superimposed 
process on the chronic interstitial disease which is likely related to either 
pulmonary vascular congestion or viral/atypical pneumonitis. This could also be 
drug-induced such as from chemotherapy. Region of consolidation right lower lobe
in region of surgery and markers does not show appreciable change. There is a 
small right pleural effusion. There is an increase in size of the lingula mass 
with now measures approximately 2.5 x 2.0 cm in size compared to 2.0 cm maximum 
dimension on study of May 3, 2018. MEDIASTINUM: Thyroid gland appears 
unremarkable. The heart is mildly enlarged. Coronary artery calcifications are 
present. No thoracic aortic aneurysm. There is mild calcified plaque seen within
the thoracic aorta.  No pericardial effusion. There is a 1.1 cm short axis AP 
window lymph node. There are 2 adjacent 1 cm short axis precarinal lymph nodes. 
There is suspicion for an enlarged right hilar lymph node but which is difficult
to evaluate without IV contrast. These lymph nodes are slightly more prominent 
than on prior examination. PLEURA: There is a small right pleural effusion. 
AXILLA: No lymphadenopathy. UPPER ABDOMEN: No adrenal gland masses. There appear
to be bilateral renal cysts. OSSEOUS STRUCTURES: No suspicious bony lesions. 
IMPRESSION: Diffuse centrilobular and paraseptal emphysema with some chronic 
interstitial lung disease. Interval increase in interstitial disease which may 
be related to interstitial edema, atypical or viral pneumonitis, or be drug-
related. Interval enlargement of lingular mass. DICTATED BY: Dayo White MD  
DATE/TIME DICTATED:06/10/18 / 1532 :RAD.WEBBER  DATE/TIME 
TRANSCRIBED:06/10/18 / 1532 CONFIDENTIAL, DO NOT COPY WITHOUT APPROPRIATE 
AUTHORIZATION.
CXR Impression: PATIENT: CHRIS ELDRIDGE  MEDICAL RECORD NO: [id number] PRESENT AGE:
68  PATIENT ACCOUNT NO: [id number] : 48  LOCATION: [facility] ORDERING PHYSICIAN:
Leonardo ARMSTRONG     SERVICE DATE: 06/10/ EXAM TYPE: RAD - XRY-PORTABLE 
CHEST XRAY EXAMINATION: XR PORTABLE CHEST CLINICAL INFORMATION: Shortness of 
breath and hypoxia. COMPARISON: Chest x-ray 2017. CT chest 5/3/2018. 
TECHNIQUE: Portable frontal view of the chest was obtained. FINDINGS: Mild 
bibasilar airspace opacities, right greater than left. There is blunting of both
costophrenic angles. Heart and mediastinal silhouette is within normal limits. 
Mild engorgement of the pulmonary vasculature. Hazy opacities are also noted in 
the right middle and right upper lobe. No acute osseous finding. IMPRESSION: 
Bibasilar airspace opacities, right greater than left with possible small 
bilateral pleural effusions. These opacities are nonspecific and may represent 
atelectasis or pneumonia. Mild engorgement of the pulmonary vasculature 
suggesting volume overload. Prominent airspace opacities in the right middle and
right upper lobe. These may represent sequela of mild volume overload or 
possibly developing foci of multifocal pneumonia. DICTATED BY: Rahat Perez MD
 DATE/TIME DICTATED:06/10/18 / 1407 :RAD.WEBBER  DATE/TIME 
TRANSCRIBED:06/10/18 / 1407 CONFIDENTIAL, DO NOT COPY WITHOUT APPROPRIATE 
AUTHORIZATION.
Initial ED EKG: normal sinus rhythm, LAFB
Prior EKG: unchanged
(Leonardo Dhaliwal)
 
Departure
 
Departure
Disposition: STILL A PATIENT
Condition: Stable
Clinical Impression
Primary Impression: Interstitial lung disease
Secondary Impressions: Hyperkalemia
Referrals:
Jacey NEWBERRY,Jaciel LUBIN (PCP/Family)
 
Departure Forms:
Customer Survey
General Discharge Information
(Leonardo Dhaliwal)
 
Admission Note
Spoke With:
Jordan NEWBERRY,Kelsey
Documentation of Exam:
Documentation of any treatments & extenuating circumstances including Concerns 
Regarding Discharge (functional status, medication knowledge or non-compliance, 
living conditions, etc.) that warrant an admission rather than observation: [
TELE ADMISSION, KAYEXALATE, TRANSFUSION, PT EVALUATION]
 
 
PA/NP Co-Sign Statement
Statement:
ED Attending supervision documentation-
 
[X] I saw and evaluated the patient. I have also reviewed all the pertinent lab 
results and diagnostic results. I agree with the findings and the plan of care 
as documented in the PA's/NP's documentation. 
 
[X] I have reviewed the ED Record and agree with the PA's/NP's documentation.
 
[] Additions or exceptions (if any) to the PAs/NP's note and plan are 
summarized below:
[SEE ABOVE NOTE]
 
(Radha NEWBERRY,Edward LANCE)
 
Critical Care Note
 
Critical Care Note
Critical Care Time:  min
(Black PA,Leonardo)

## 2018-06-10 NOTE — CT SCAN REPORT
EXAMINATION:
CT CHEST WITHOUT CONTRAST
 
CLINICAL INFORMATION:
Shortness of breath. Hypoxia.
 
COMPARISON:
May 3, 2018 and January 22, 2018
 
TECHNIQUE:
Multidetector volumetric CT imaging of the chest was done. Axial MIP volume
rendering provided. Sagittal and coronal reformatted images were obtained.
 
DLP:
211.66 mGy-cm
 
FINDINGS:
 
LUNGS: There are significant changes of paraseptal and centrilobular
emphysema again seen bilaterally. There are also noted to be regions of
reticulation and interstitial fibrosis in all lobes. There is an increase in
the interstitial prominence suggestive of superimposed process on the chronic
interstitial disease which is likely related to either pulmonary vascular
congestion or viral/atypical pneumonitis. This could also be drug-induced
such as from chemotherapy.
 
Region of consolidation right lower lobe in region of surgery and markers
does not show appreciable change. There is a small right pleural effusion.
 
There is an increase in size of the lingula mass with now measures
approximately 2.5 x 2.0 cm in size compared to 2.0 cm maximum dimension on
study of May 3, 2018.
 
MEDIASTINUM: Thyroid gland appears unremarkable. The heart is mildly
enlarged. Coronary artery calcifications are present. No thoracic aortic
aneurysm. There is mild calcified plaque seen within the thoracic aorta.  No
pericardial effusion. There is a 1.1 cm short axis AP window lymph node.
There are 2 adjacent 1 cm short axis precarinal lymph nodes. There is
suspicion for an enlarged right hilar lymph node but which is difficult to
evaluate without IV contrast. These lymph nodes are slightly more prominent
than on prior examination.
 
PLEURA: There is a small right pleural effusion.
 
AXILLA: No lymphadenopathy.
 
UPPER ABDOMEN: No adrenal gland masses. There appear to be bilateral renal
cysts.
 
OSSEOUS STRUCTURES: No suspicious bony lesions.
 
IMPRESSION:
Diffuse centrilobular and paraseptal emphysema with some chronic interstitial
lung disease. Interval increase in interstitial disease which may be related
to interstitial edema, atypical or viral pneumonitis, or be drug-related.
 
Interval enlargement of lingular mass.

## 2018-06-11 VITALS — SYSTOLIC BLOOD PRESSURE: 94 MMHG | DIASTOLIC BLOOD PRESSURE: 50 MMHG

## 2018-06-11 VITALS — SYSTOLIC BLOOD PRESSURE: 116 MMHG | DIASTOLIC BLOOD PRESSURE: 56 MMHG

## 2018-06-11 VITALS — DIASTOLIC BLOOD PRESSURE: 66 MMHG | SYSTOLIC BLOOD PRESSURE: 116 MMHG

## 2018-06-11 LAB
ABSOLUTE GRANULOCYTE CT: 5.6 /CUMM (ref 1.4–6.5)
ABSOLUTE GRANULOCYTE CT: 6.1 /CUMM (ref 1.4–6.5)
BASOPHILS # BLD: 0 /CUMM (ref 0–0.2)
BASOPHILS # BLD: 0 /CUMM (ref 0–0.2)
BASOPHILS NFR BLD: 0 % (ref 0–2)
BASOPHILS NFR BLD: 0.1 % (ref 0–2)
EOSINOPHIL # BLD: 0.2 /CUMM (ref 0–0.7)
EOSINOPHIL # BLD: 0.3 /CUMM (ref 0–0.7)
EOSINOPHIL NFR BLD: 3.1 % (ref 0–5)
EOSINOPHIL NFR BLD: 3.2 % (ref 0–5)
ERYTHROCYTE [DISTWIDTH] IN BLOOD BY AUTOMATED COUNT: 18 % (ref 11.5–14.5)
ERYTHROCYTE [DISTWIDTH] IN BLOOD BY AUTOMATED COUNT: 19.7 % (ref 11.5–14.5)
GRANULOCYTES NFR BLD: 71.1 % (ref 42.2–75.2)
GRANULOCYTES NFR BLD: 74.9 % (ref 42.2–75.2)
HCT VFR BLD CALC: 21.5 % (ref 37–47)
HCT VFR BLD CALC: 25.6 % (ref 37–47)
LYMPHOCYTES # BLD: 0.8 /CUMM (ref 1.2–3.4)
LYMPHOCYTES # BLD: 1.3 /CUMM (ref 1.2–3.4)
MCH RBC QN AUTO: 25.8 PG (ref 27–31)
MCH RBC QN AUTO: 26.1 PG (ref 27–31)
MCHC RBC AUTO-ENTMCNC: 31.9 G/DL (ref 33–37)
MCHC RBC AUTO-ENTMCNC: 32.2 G/DL (ref 33–37)
MCV RBC AUTO: 80.9 FL (ref 81–99)
MCV RBC AUTO: 81.3 FL (ref 81–99)
MONOCYTES # BLD: 0.7 /CUMM (ref 0.1–0.6)
MONOCYTES # BLD: 1 /CUMM (ref 0.1–0.6)
PLATELET # BLD: 315 /CUMM (ref 130–400)
PLATELET # BLD: 316 /CUMM (ref 130–400)
PMV BLD AUTO: 8.8 FL (ref 7.4–10.4)
PMV BLD AUTO: 8.9 FL (ref 7.4–10.4)
RED BLOOD CELL CT: 2.65 /CUMM (ref 4.2–5.4)
RED BLOOD CELL CT: 3.15 /CUMM (ref 4.2–5.4)
WBC # BLD AUTO: 7.9 /CUMM (ref 4.8–10.8)
WBC # BLD AUTO: 8.2 /CUMM (ref 4.8–10.8)

## 2018-06-11 PROCEDURE — 30233N1 TRANSFUSION OF NONAUTOLOGOUS RED BLOOD CELLS INTO PERIPHERAL VEIN, PERCUTANEOUS APPROACH: ICD-10-PCS | Performed by: INTERNAL MEDICINE

## 2018-06-11 NOTE — ULTRASOUND REPORT
EXAMINATION:
US DUPLEX LOWER EXTREMITY VEINS, BILATERAL
 
CLINICAL INFORMATION:
Hypoxia. Leg pain. Swelling. History of malignancy. Bilateral lower extremity
edema. Bilateral lower extremity swelling
 
COMPARISON:
None.
 
TECHNIQUE:
Real-time grayscale compression evaluation of the deep venous system. The
compression exam is supplemented by color mapping and spectral analysis. Calf
augmentation was used.
 
Bilateral lower extremities
 
FINDINGS:
The deep venous system was visualized and compressible. The Doppler exam is
normal.
 
IMPRESSION:
No deep venous thrombosis demonstrated.

## 2018-06-11 NOTE — CONS- CARDIOLOGY
General Information and HPI
 
Consulting Request
Date of Consult: 06/11/18
Requested By:
Brittany Villarreal MD
 
Reason for Consult:
Shortness of breath
Source of Information: patient, old records
History of Present Illness:
 
 
This is a pleasant 67 y/o female with a past medical history of CAD with prior 
PCI (2001/2014) and STEMI tx with BEN to the RCA 4/2016, Hx COPD/ILD/lung cancer
s/p gamma knife radiation on home O2, PAF not on AC due to hx of GI bleed and 
anemia, CKD, angina, and hypertension who presents to [facility] with a 
chief complaint of increased shortness of breath for the past 4 days also 
associated with weakness and increased hypoxia. She did report some intermittent
chest discomfort with radiation which has since resolved. Denies any obvious 
bleeding episode.  Denies palpitations, slurring of speech, focal weakness, or 
subjective fever.  Denies any increased lower extremity edema or paroxysmal 
nocturnal dyspnea.  She does receive Epogen as an outpatient for anemia.
 
 
 
 
Allergies/Medications
Allergies:
Coded Allergies:
No Known Allergies (05/25/16)
 
Home Med List:
Albuterol Sulfate (Proair Respiclick) 90 MCG AER.POW.BA   2 PUFF INH Q8P PRN 
COPD  (Reported)
Alprazolam 0.5 MG TABLET   1 TAB PO BID ANXIETY  (Reported)
Amiodarone HCl (Pacerone) 100 MG TABLET   100 MG PO EOD AFIB  (Reported)
Aspirin (Children's Aspirin) 81 MG TAB.CHEW   1 TAB PO DAILY HEART HEALTH  (
Reported)
Cholecalciferol (Vitamin D3) (Vitamin D3) 1,000 UNIT CAPSULE   1 CAP PO DAILY 
SUPPLEMENT  (Reported)
diphenhydrAMINE HCl (Benadryl) 25 MG CAPSULE   1 CAP PO QHS PRN SLEEP  (Reported
)
Epoetin Herm[place] (Epogen) (Unknown Strength) VIAL   (Unknown Dose) SC [id number] 
SUPPLEMENT  (Reported)
     Q 3 WEEKS, LAST TAKEN MONDAY 6/4/18)
Ezetimibe (Zetia) 10 MG TABLET   1 TAB PO DAILY CHOLESTEROL  (Reported)
Fluticasone/Vilanterol (Breo Ellipta 100-25 Mcg INH) 100 MCG-25 MCG/DOSE 
BLST.W.DEV   1 PUFF INH DAILY COPD  (Reported)
Furosemide 20 MG TABLET   20 MG PO EOD HEART  (Reported)
Levothyroxine Sodium 50 MCG TABLET   1 TAB PO DAILY THYROID  (Reported)
Metoprolol Succ XL (Toprol Xl) 50 MG TAB.ER.24H   1 TAB PO DAILY AFIB  (Reported
)
Mirtazapine 15 MG TABLET   1 TAB PO QPM PRN SLEEP  (Reported)
Omeprazole 40 MG CAPSULE.DR   1 TAB PO DAILY HEARTBURN  (Reported)
Ranolazine (Ranexa) 500 MG TAB.ER.12H   1,000 MG PO BID ANGINA  (Reported)
Rosuvastatin Calcium (Crestor) 10 MG TABLET   1 TAB PO DAILY HEART HEALTH  (
Reported)
Sennosides (Senna) 8.6 MG TABLET   8.6 MG PO PRN BOWELS  (Reported)
Umeclidinium Bromide (Incruse Ellipta) 62.5 MCG/ACTUATION BLST.W.DEV   1 PUFF 
INH DAILY COPD  (Reported)
 
Current Medications:
 Current Medications
 
 
  Sig/Enrique Start time  Last
 
Medication Dose Route Stop Time Status Admin
 
Acetaminophen 650 MG Q4P PRN 06/11 0830 AC 06/11
 
  PO   0840
 
Acetaminophen 650 MG ONCE ONE 06/11 0145 DC 06/11
 
  PO 06/11 0146  0140
 
Albuterol Sulfate 3 ML BID 06/11 0900 AC 06/11
 
  INH   0850
 
Albuterol Sulfate 2 PUF Q4 06/10 1800 AC 06/11
 
  INH   0641
 
Albuterol Sulfate 3 ML ONCE ONE 06/10 1400 DC 06/10
 
  INH 06/10 1401  1400
 
Alprazolam 0.5 MG BID 06/10 2100 AC 06/11
 
  PO 06/17 2059  0840
 
Amiodarone HCl 100 MG Q48H 06/10 1745 AC 06/10
 
  PO   2154
 
Atorvastatin Calcium 40 MG 1700 06/11 1700 AC 
 
  PO   
 
Budesonide/ 2 PUF BID 06/10 2100 AC 06/10
 
Formoterol Fumarate  INH   2158
 
Cholecalciferol 3,000 IU DAILY 06/11 0900 AC 06/11
 
  PO   0945
 
Diphenhydramine HCl 25 MG AT BEDTIME PRN 06/10 2100 DC 06/10
 
  PO   2154
 
Ezetimibe 10 MG DAILY 06/11 0900 AC 06/11
 
  PO   0945
 
Furosemide 20 MG Q48H 06/10 1745 AC 
 
  PO   
 
Furosemide 0 .STK-MED ONE 06/10 1443 DC 
 
  IV   
 
Furosemide 40 MG ONCE ONE 06/10 1430 DC 06/10
 
  IV 06/10 1431  1455
 
Heparin Sodium  5,000 UNIT Q8 06/10 2200 DC 06/11
 
(Porcine)  SC   0641
 
Hydroxyzine HCl 50 MG AT BEDTIME AS NEED.. 06/11 0945 AC 
 
  PO   
 
Ipratropium Bromide 2.5 ML ONCE ONE 06/10 1400 DC 06/10
 
  INH 06/10 1401  1400
 
Levothyroxine Sodium 0.05 MG DAILY AC 06/11 0700 AC 06/11
 
  PO   0630
 
Melatonin 5 MG AT BEDTIME 06/11 2100 CAN 
 
  PO   
 
Metoprolol Succinate 50 MG DAILY 06/11 0900 AC 
 
  PO   
 
Mirtazapine 15 MG QPM PRN 06/10 1745 AC 
 
  PO   
 
Omeprazole 40 MG DAILY AC 06/11 0700 AC 06/11
 
  PO   0629
 
Ramelteon 8 MG AT BEDTIME 06/11 2100 AC 
 
  PO   
 
Ranolazine 1,000 MG BID 06/10 2100 AC 06/11
 
  PO   0945
 
Senna/Docusate Sodium 1 TAB DAILY PRN 06/10 1745 AC 
 
  PO   
 
Sodium Polystyrene  0 .STK-MED ONE 06/10 1615 DC 
 
Sulfonate  .ROUTE   
 
Sodium Polystyrene  60 ML ONCE ONE 06/10 1600 DC 06/10
 
Sulfonate  PO 06/10 1601  1611
 
Tiotropium [place] 1 PUF DAILY 06/10 1739 AC 06/11
 
  INH   0944
 
 
 
 
Review of Systems
Review of Systems:
 
Review of systems as per HPI. The remainder of a 10 point review of systems was 
reviewed and was otherwise negative.
 
Past History
 
Travel History
Traveled to [place] past 21 day No
 
Medical History
Neurological: NONE
EENT: NONE
Cardiovascular: CAD, hypertension, hyperlipidemia, myocardial infarction, STENTS
Respiratory: pneumonia, pulmonary fibrosis, LUNG CA ILD
Gastrointestinal: peptic ulcer disease
Hepatic: NONE
Renal: chronic kidney disease
Musculoskeletal: disk herniation
Psychiatric: NONE, anxiety
Endocrine: NONE
Blood Disorders: anemia
Cancer(s): lung cancer
GYN/Reproductive: NONE
 
Surgical History
Surgical History: STENTS (5) HYSTERECTOMY,LAMINECTOMY
 
Family History
Relations & Conditions If Any:
MOTHER (Leukemia).
FATHER (Heart Attack).
SISTER (ovarian cancer).
BROTHER (Diabetes).
 
 
Psychosocial History
Where Do You Live? Assisted Living
Who Do You Live With? self
Services at Home: Nursing, Physical Therapy
Primary Language: English
Smoking Status: Former Smoker
ETOH Use: denies use
Illicit Drug Use: denies illicit drug use
 
Functional Ability
ADLs
Independent: dressing, eating, toileting, bathing. 
Ambulation: cane
IADLs
Independent: shopping, housework, finances, food prep, telephone, transportation
, medication admin. 
 
Exam & Diagnostic Data
Vital Signs and I&O
Vital Signs
 
 
Date Time Temp Pulse Resp B/P B/P Pulse O2 O2 Flow FiO2
 
     Mean Ox Delivery Rate 
 
06/11 0945  73  114/51     
 
06/11 0918      92 Nasal 55% 
 
       Cannula  
 
06/11 0702 98.4 68 18 116/66  99 Non  
 
       ReBreather  
 
06/11 0112      92 Nasal 55% 
 
       Cannula  
 
06/11 0000       Nasal 55% 
 
       Cannula  
 
06/10 2230 99.9 78 18 104/58  92 Part  
 
       ReBreather  
 
06/10 2155  81  134/64     
 
06/10 2154  81  134/64     
 
06/10 2130       Nasal 5.0L 
 
       Cannula  
 
06/10 1829 98.3 81 18 134/64  92 Nasal 5.0L 
 
       Cannula  
 
06/10 1810       Nasal 3.0L 
 
       Cannula  
 
06/10 1735 97.4  28      
 
06/10 1731  88 24 140/68  92 Nasal 5.0L 
 
       Cannula  
 
06/10 1424      91 Nasal 5.0L 
 
       Cannula  
 
06/10 1336      86 Nasal 5.0L 
 
       Cannula  
 
06/10 1308 97.9 75 22 180/70  86 Nasal 5.0L 
 
       Cannula  
 
 
 Intake & Output
 
 
 06/11 1600 06/11 0800 06/11 0000 06/10 1600 06/10 0800 06/10 0000
 
Intake Total  200 300   
 
Output Total  350 450 250  
 
Balance  -150 -150 -250  
 
       
 
Intake, Oral  200 300   
 
Output, Urine  350 450 250  
 
Patient   124 lb 129 lb  
 
Weight      
 
Weight   Bed scale Reported by Patient  
 
Measurement      
 
Method      
 
 
 
Physical Exam:
 
General: no apparent distress. Alert.  On nasal cannula
Eyes: No obvious scleral icterus.
HEENT: No jugular venous distention or abnormal jugular venous pulsations.
Cardiovascular: Normal intensity S1/S2.  Regular
Respiratory: Decreased air entry bilaterally
Abdomen: Soft, nontender with no guarding or rebound tenderness.
Musculoskeletal: No clubbing or cyanosis noted; no edema
Skin: warm
Neurologic: No gross focal deficits noted.
 
Labs/Stephon Results:
 Laboratory Tests
 
 
 06/11 06/10 06/10
 
 0654 1955 1615
 
Chemistry   
 
  Sodium (137 - 145 mmol/L) 141  
 
  Potassium (3.5 - 5.1 mmol/L) 4.4  
 
  Chloride (98 - 107 mmol/L) 104  
 
  Carbon Dioxide (22 - 30 mmol/L) 23  
 
  Anion Gap (5 - 16) 14  
 
  BUN (7 - 17 mg/dL) 31  H  
 
  Creatinine (0.5 - 1.0 mg/dL) 2.0  H  
 
  Estimated GFR (>60 ml/min) 25  L  
 
  BUN/Creatinine Ratio (7 - 25 %) 15.5  
 
  Lactic Acid   Cancelled
 
  Troponin I (< 0.11 ng/ml) 0.01 < 0.01 
 
Hematology   
 
  CBC w Diff NO MAN DIFF REQ  
 
  WBC (4.8 - 10.8 /CUMM) 8.2  
 
  RBC (4.20 - 5.40 /CUMM) 2.65  L  
 
  Hgb (12.0 - 16.0 G/DL) 6.9 *L  
 
  Hct (37 - 47 %) 21.5  L  
 
  MCV (81.0 - 99.0 FL) 80.9  L  
 
  MCH (27.0 - 31.0 PG) 25.8  L  
 
  MCHC (33.0 - 37.0 G/DL) 31.9  L  
 
  RDW (11.5 - 14.5 %) 19.7  H  
 
  Plt Count (130 - 400 /CUMM) 315  
 
  MPV (7.4 - 10.4 FL) 8.9  
 
  Gran % (42.2 - 75.2 %) 74.9  
 
  Lymphocytes % (20.5 - 51.1 %) 9.8  L  
 
  Monocytes % (1.7 - 9.3 %) 12.0  H  
 
  Eosinophils % (0 - 5 %) 3.2  
 
  Basophils % (0.0 - 2.0 %) 0.1  
 
  Absolute Granulocytes (1.4 - 6.5 /CUMM) 6.1  
 
  Absolute Lymphocytes (1.2 - 3.4 /CUMM) 0.8  L  
 
  Absolute Monocytes (0.10 - 0.60 /CUMM) 1.0  H  
 
  Absolute Eosinophils (0.0 - 0.7 /CUMM) 0.3  
 
  Absolute Basophils (0.0 - 0.2 /CUMM) 0  
 
 
 
 
 06/10 06/10
 
 1445 1340
 
Chemistry  
 
  Sodium (137 - 145 mmol/L)  143
 
  Potassium (3.5 - 5.1 mmol/L)  5.7  H
 
  Chloride (98 - 107 mmol/L)  107
 
  Carbon Dioxide (22 - 30 mmol/L)  22
 
  Anion Gap (5 - 16)  14
 
  BUN (7 - 17 mg/dL)  26  H
 
  Creatinine (0.5 - 1.0 mg/dL)  1.9  H
 
  Estimated GFR (>60 ml/min)  26  L
 
  BUN/Creatinine Ratio (7 - 25 %)  13.7
 
  Glucose (65 - 99 mg/dL)  101  H
 
  Lactic Acid (0.7 - 2.1 mmol/L)  1.7
 
  Calcium (8.4 - 10.2 mg/dL)  9.6
 
  Magnesium (1.6 - 2.3 mg/dL)  1.8
 
  Total Bilirubin (0.2 - 1.3 mg/dL)  0.5
 
  AST (14 - 36 U/L)  36
 
  ALT (9 - 52 U/L)  23
 
  Alkaline Phosphatase (<127 U/L)  143  H
 
  Troponin I (< 0.11 ng/ml)  < 0.01
 
  Pro-B-Natriuretic Pept (<125 pg/mL)  4570  H
 
  Total Protein (6.3 - 8.2 g/dL)  7.2
 
  Albumin (3.5 - 5.0 g/dL)  4.0
 
  Globulin (1.9 - 4.2 gm/dL)  3.2
 
  Albumin/Globulin Ratio (1.1 - 2.2 %)  1.3
 
Coagulation  
 
  PT (9.4 - 12.5 SEC)  12.0
 
  INR (0.90 - 1.19)  1.10
 
  APTT (25 - 37 SEC)  29
 
Hematology  
 
  CBC w Diff  MAN DIFF ORDERED
 
  WBC (4.8 - 10.8 /CUMM)  11.2  H
 
  RBC (4.20 - 5.40 /CUMM)  3.08  L
 
  Hgb (12.0 - 16.0 G/DL)  7.9  L
 
  Hct (37 - 47 %)  25.1  L
 
  MCV (81.0 - 99.0 FL)  81.7
 
  MCH (27.0 - 31.0 PG)  25.6  L
 
  MCHC (33.0 - 37.0 G/DL)  31.3  L
 
  RDW (11.5 - 14.5 %)  19.7  H
 
  Plt Count (130 - 400 /CUMM)  353
 
  MPV (7.4 - 10.4 FL)  8.6
 
  Segmented Neutrophils (42.2 - 75.2 %)  87  H
 
  Lymphocytes (20.5 - 51.1 %)  2  L
 
  Monocytes (1.7 - 9.3 %)  9
 
  Eosinophils (0 - 5.0 %)  2
 
  Nucleated RBCs (0.0 - 0.0 /100WBC)  1  H
 
  Polychromasia  1+
 
  Hypochromic-Microcytic  2+
 
  Poikilocytosis  1+
 
  Anisocytosis  2+
 
Urines  
 
  Urine Color (YEL,AMB,STR) YEL 
 
  Urine Clarity (CLEAR) CLEAR 
 
  Urine pH (5.0 - 8.0) 7.0 
 
  Ur Specific Gravity (1.001 - 1.035) 1.010 
 
  Urine Protein (NEG,<30 MG/DL) NEG 
 
  Urine Ketones (NEG) NEG 
 
  Urine Nitrite (NEG) NEG 
 
  Urine Bilirubin (NEG) NEG 
 
  Urine Urobilinogen (0.1  -  1.0 EU/dl) 0.2 
 
  Ur Leukocyte Esterase (NEG) NEG 
 
  Ur Microscopic EXAM NOT REQUIRED 
 
  Urine Hemoglobin (NEG) NEG 
 
  Urine Glucose (N MG/DL) NEG 
 
 
 
 
Diagnostic Data
EKG Results
 
Tracing was personally reviewed and shows sinus rhythm at 60 bpm with left 
anterior fascicular block and nonspecific ST/T abnormalities
CXR Results
 
Bibasilar airspace opacities, right greater than left with possible small
bilateral pleural effusions. These opacities are nonspecific and may
represent atelectasis or pneumonia.
 
Mild engorgement of the pulmonary vasculature suggesting volume overload.
 
Prominent airspace opacities in the right middle and right upper lobe. These
may represent sequela of mild volume overload or possibly developing foci of
multifocal pneumonia.
Other Results
 
CT scan:
Diffuse centrilobular and paraseptal emphysema with some chronic interstitial
lung disease. Interval increase in interstitial disease which may be related
to interstitial edema, atypical or viral pneumonitis, or be drug-related.
 
Interval enlargement of lingular mass.
 
 
Telemetry tracings were personally reviewed and shows sinus rhythm
 
Assessment/Plan
Assessment/Plan
 
1. Shortness of breath/weakness, likely multifactorial
2. CAD with prior PCI (2001/2014) and prior STEMI; tx with BEN to the RCA 4/2016
3. Hx COPD/ILD/lung cancer s/p gamma knife radiation
4. hx PAF not on AC due to hx of GI bleed and anemia, In SR on low dose 
Amiodarone
5. CKD
6. Hyperkalemia, improved
7. Acute on chronic anemia
8. History of angina on Ranexa
 
Patient's shortness of breath/angina is likely multifactorial but her hemoglobin
is 6.9 today which is very likely contributing to some of her symptoms; agree 
with transfusion and while she currently appears euvolemic her volume status 
should be monitored closely status post transfusion.  Check stool guaiac. Serial
troponins are negative.
 
Would recommend obtaining a repeat echocardiogram as she has not had a recent 
study.
 
Aspirin is currently on hold due to symptomatic anemia. Continue beta blocker, 
Ranexa, lipid regimen.
 
Follow-up pulmonary recommendations. Continue on the low-dose amiodarone if no 
significant objection from pulmonary as this appears to be maintaining her in 
sinus rhythm.
 
 
Benjamin Pacheco MD [facility]
 
 
Consult Acknowledgment
- Thank you for your consult request.

## 2018-06-11 NOTE — PN- HOUSESTAFF
**See Addendum**
Subjective
Follow-up For:
Dyspnea, chest pain
Tele-Events Since Last Visit:
Sinus rhythm, 6080
Subjective:
Patient had increasing oxygen requirement to 55% on high flow last night.  She 
says that she was not feeling terribly short of breath but they put her on her. 
She does not like it that would like to go back to nasal cannula.  She has no 
shortness of breath or chest pain at this time.  She is also concerned about 
getting her lung biopsy tomorrow.  Otherwise, she has some nausea but no other 
complaints
 
Review of Systems
Constitutional:
Reports: no symptoms. 
EENTM:
Reports: no symptoms. 
Cardiovascular:
Reports: see HPI. 
Respiratory:
Reports: see HPI. 
Gastrointestinal:
Reports: see HPI. 
Genitourinary:
Reports: no symptoms. 
Musculoskeletal:
Reports: no symptoms. 
Skin:
Reports: no symptoms. 
Neurological/Psychological:
Reports: no symptoms. 
Hematologic/Endocrine:
Reports: no symptoms. 
Immunologic/Allergic:
Reports: no symptoms. 
 
Objective
Last 24 Hrs of Vital Signs/I&O
 Vital Signs
 
 
Date Time Temp Pulse Resp B/P B/P Pulse O2 O2 Flow FiO2
 
     Mean Ox Delivery Rate 
 
 0702 98.4 68 18 116/66  99 Non  
 
       ReBreather  
 
 0112      92 Nasal 55% 
 
       Cannula  
 
 0000       Nasal 55% 
 
       Cannula  
 
06/10 2230 99.9 78 18 104/58  92 Part  
 
       ReBreather  
 
06/10 2155  81  134/64     
 
06/10 2154  81  134/64     
 
06/10 2130       Nasal 5.0L 
 
       Cannula  
 
06/10 1829 98.3 81 18 134/64  92 Nasal 5.0L 
 
       Cannula  
 
06/10 1810       Nasal 3.0L 
 
       Cannula  
 
06/10 1735 97.4  28      
 
06/10 1731  88 24 140/68  92 Nasal 5.0L 
 
       Cannula  
 
06/10 1424      91 Nasal 5.0L 
 
       Cannula  
 
06/10 1336      86 Nasal 5.0L 
 
       Cannula  
 
06/10 1308 97.9 75 22 180/70  86 Nasal 5.0L 
 
       Cannula  
 
 
 Intake & Output
 
 
  0800  0000 06/10 1600
 
Intake Total 200 300 
 
Output Total 350 450 250
 
Balance -150 -150 -250
 
    
 
Intake, Oral 200 300 
 
Output, Urine 350 450 250
 
Patient  56.359 kg 58.513 kg
 
Weight   
 
Weight  Bed scale Reported by Patient
 
Measurement   
 
Method   
 
 
 
 
Physical Exam
General Appearance: Alert, Oriented X3, Cooperative, No Acute Distress
Cardiovascular: Regular Rate, Normal S1, Normal S2
Lungs: crackles
Abdomen: Normal Bowel Sounds, Soft, No Tenderness
Extremities: No Edema, Normal Pulses, No Tenderness/Swelling
Current Medications:
 Current Medications
 
 
  Sig/Enrique Start time  Last
 
Medication Dose Route Stop Time Status Admin
 
Acetaminophen 650 MG ONCE ONE  0145 DC 
 
  PO  0146  0140
 
Albuterol Sulfate 3 ML BID  0900 AC 
 
  INH   
 
Albuterol Sulfate 2 PUF Q4 06/10 1800 AC 
 
  INH   0641
 
Albuterol Sulfate 3 ML ONCE ONE 06/10 1400 DC 06/10
 
  INH 06/10 1401  1400
 
Alprazolam 0.5 MG BID 06/10 2100 AC 06/10
 
  PO  2059  2203
 
Amiodarone HCl 100 MG Q48H 06/10 1745 AC 06/10
 
  PO   2154
 
Atorvastatin Calcium 40 MG 1700  1700 AC 
 
  PO   
 
Budesonide/ 2 PUF BID 06/10 2100 AC 06/10
 
Formoterol Fumarate  INH   2158
 
Cholecalciferol 3,000 IU DAILY 900 AC 
 
  PO   
 
Diphenhydramine HCl 25 MG AT BEDTIME PRN 06/10 2100 AC 06/10
 
  PO   2154
 
Ezetimibe 10 MG DAILY  09 AC 
 
  PO   
 
Furosemide 20 MG Q48H 06/10 174 AC 
 
  PO   
 
Furosemide 0 .STK-MED ONE 06/10 1443 DC 
 
  IV   
 
Furosemide 40 MG ONCE ONE 06/10 1430 DC 06/10
 
  IV 06/10 1431  1455
 
Heparin Sodium  5,000 UNIT Q8 06/10 2200 AC 
 
(Porcine)  SC   0641
 
Ipratropium Bromide 2.5 ML ONCE ONE 06/10 1400 DC 06/10
 
  INH 06/10 1401  1400
 
Levothyroxine Sodium 0.05 MG DAILY AC  0700 AC 
 
  PO   0630
 
Metoprolol Succinate 50 MG DAILY  0900 AC 
 
  PO   
 
Mirtazapine 15 MG QPM PRN 06/10 1745 AC 
 
  PO   
 
Omeprazole 40 MG DAILY AC  0700 AC 
 
  PO   0629
 
Ranolazine 1,000 MG BID 06/10 2100 AC 06/10
 
  PO   2155
 
Senna/Docusate Sodium 1 TAB DAILY PRN 06/10 1745 AC 
 
  PO   
 
Sodium Polystyrene  0 .STK-MED ONE 06/10 1615 DC 
 
Sulfonate  .ROUTE   
 
Sodium Polystyrene  60 ML ONCE ONE 06/10 1600 DC 06/10
 
Sulfonate  PO 06/10 1601  1611
 
Tiotropium [place] 1 PUF DAILY 06/10 1739 AC 
 
  INH   
 
 
 
 
Last 24 Hrs of Lab/Stephon Results
Last 24 Hrs of Labs/Mics:
 Laboratory Tests
 
18 0654:
Sodium Pending, Potassium Pending, Chloride Pending, Carbon Dioxide Pending, 
Anion Gap Pending, BUN Pending, Creatinine Pending, BUN/Creatinine Ratio Pending
, Troponin I Pending, CBC w Diff Pending, WBC Pending, RBC Pending, Hgb Pending,
Hct Pending, MCV Pending, MCH Pending, MCHC Pending, RDW Pending, Plt Count 
Pending, MPV Pending
 
06/10/18 1955:
Troponin I < 0.01
 
06/10/18 1615:
Lactic Acid Cancelled
 
06/10/18 1445:
Urine Color YEL, Urine Clarity CLEAR, Urine pH 7.0, Ur Specific Gravity 1.010, 
Urine Protein NEG, Urine Ketones NEG, Urine Nitrite NEG, Urine Bilirubin NEG, 
Urine Urobilinogen 0.2, Ur Leukocyte Esterase NEG, Ur Microscopic EXAM NOT 
REQUIRED, Urine Hemoglobin NEG, Urine Glucose NEG
 
06/10/18 1340:
Anion Gap 14, Estimated GFR 26  L, BUN/Creatinine Ratio 13.7, Glucose 101  H, 
Lactic Acid 1.7, Calcium 9.6, Magnesium 1.8, Total Bilirubin 0.5, AST 36, ALT 23
, Alkaline Phosphatase 143  H, Troponin I < 0.01, Pro-B-Natriuretic Pept 4570  H
, Total Protein 7.2, Albumin 4.0, Globulin 3.2, Albumin/Globulin Ratio 1.3, PT 
12.0, INR 1.10, APTT 29, CBC w Diff MAN DIFF ORDERED, RBC 3.08  L, MCV 81.7, MCH
25.6  L, MCHC 31.3  L, RDW 19.7  H, MPV 8.6, Segmented Neutrophils 87  H, 
Lymphocytes 2  L, Monocytes 9, Eosinophils 2, Nucleated RBCs 1  H, Polychromasia
1+, Hypochromic-Microcytic 2+, Poikilocytosis 1+, Anisocytosis 2+
 Microbiology
06/10 184  NASOPHARYN: Influenza Virus A & B Rapid Smear - COLB
 
 
 
Assessment/Plan
Assessment:
Ms. McTrottes is a 68-year-old female with past medical history of coronary 
artery disease status post MI with multiple stents followed by Dr. Castrejon, 
hypertension, hyperlipidemia, squamous cell lung cancer status post gamma knife 
followed by Dr. Corcoran, interstitial lung disease on 3 L oxygen, chronic kidney 
disease followed by Dr. Chacon, paroxysmal atrial fibrillation not on 
anticoagulation due to a GI bleed and anxiety who presented with shortness of 
breath.  
 
Problem list:
1.  Dyspnea with extensive pulmonary disease history
2.  Chest pain syndrome
3.  Hyperkalemia
4.  Acute blood loss anemia
 
#Dyspnea with extensive pulmonary disease history: Patient has an extensive long
history including lung cancer and interstitial lung disease and presented with 
acutely worsening dyspnea.  Her oxygen requirement went up overnight. 
Differential would include COPD exacerbation versus CHF versus amiodarone 
induced toxicity.  I think that pulmonary embolism and pneumonia are less 
likely. CT imaging is nonspecific and suggests multiple possible etiologies.  
EKG and troponins 3 have been negative.  She was scheduled for lung biopsy 
tomorrow originally and would like to have this.  However, her treatment options
would be limited given her advanced disease.
-Appreciate cardiology and pulmonology recommendations
-Daily weights, strict I's and O's
-Continue ranolazine
-Consider further diuresis
-Ultrasound right lower extremity
 
#Hyperkalemia: Patient has hyperkalemia that is mild without peak T waves.  It 
has resolved this morning after Kayexalate.
-Continue to monitor
 
#Acute blood loss anemia: Patient has a chronic anemia secondary to her chronic 
kidney disease for which he receives Epogen.  She also has history of GI bleed. 
She is not complaining of any hematochezia or melena recently.  However, this 
morning her hemoglobin dropped from 7.9 to 6.9.  
-Transfuse 1 unit packed red blood cell
-Guaiac all stools
-Continue Epogen
-Continue to monitor
 
#Chronic medical problems:
Continue other home medications
 
DVT prophylaxis with heparin
CHF diet
DNR/okay to intubate
Problem List:
 1. Dyspnea
 
Pain Ratin
Pain Location:
no
Pain Goal: Remain pain free
Pain Plan:
see a/p
Tomorrow's Labs & Rationales:
cbc, bep

## 2018-06-11 NOTE — CONS- PULMONARY
General Information and HPI
 
Consulting Request
Date of Consult: 06/11/18
Requested By:
Dr. Villarreal
Reason for Consult:
Dyspnea, hypoxemic respiratory failure
Source of Information: patient
Exam Limitations: no limitations
History of Present Illness:
68 year old woman. Consultation requested. 
Dyspnea at baseline, on oxygen.
Hx of lung ca and COPD. Recently seen in office. 
Previously IR guided lung bx was not feasible due to safety of puncturing JHONY 
and other vessels and was cancelled.
Her most recent CT reveals an increase in size of the lesion to 2cm from 1.2cm. 
There is worry that this is malignant.
Pt. is anxious. +dyspnea, no cp, no fevers, no chills, +fatigue, +limited 
mobility. No ha, no cp, no n/v/d/c.
2015 PFTs with a severe reduction in DLCO (25) FEV1 1.58Liters; and mild 
obstruction without any bronchodilator response.
On BREO & Incruse.
Stable weight and appetite.
 
CT chest reviewed it was compared to previous studies, namely 2015 and 2016.
Multiple nodules are noted with severe emphysema.
Specific worrisome lesion in lingula increased in size.
 
Previous History:
Sequence of events include:
 
6/2014 Pulmonary function test showed - Moderate obstructive lung disease with 
significant reduction in diffusion capacity uncorrected for hemoglobin.
 
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 
carincoma. 
 
Has had cyberknife radiation April 14-21 with Dr. Kirill Cardozo. 
 
Has had fiducial markers placed March 2015 by Dr. Portillo. 
 
Allergies/Medications
Allergies:
Coded Allergies:
No Known Allergies (05/25/16)
 
Home Med List:
Albuterol Sulfate (Proair Respiclick) 90 MCG AER.POW.BA   2 PUFF INH Q8P PRN 
COPD  (Reported)
Alprazolam 0.5 MG TABLET   1 TAB PO BID ANXIETY  (Reported)
Amiodarone HCl (Pacerone) 100 MG TABLET   100 MG PO EOD AFIB  (Reported)
Aspirin (Children's Aspirin) 81 MG TAB.CHEW   1 TAB PO DAILY HEART HEALTH  (
Reported)
Cholecalciferol (Vitamin D3) (Vitamin D3) 1,000 UNIT CAPSULE   1 CAP PO DAILY 
SUPPLEMENT  (Reported)
diphenhydrAMINE HCl (Benadryl) 25 MG CAPSULE   1 CAP PO QHS PRN SLEEP  (Reported
)
Epoetin Hermelindo (Epogen) (Unknown Strength) VIAL   (Unknown Dose) SC [id number] 
SUPPLEMENT  (Reported)
     Q 3 WEEKS, LAST TAKEN MONDAY 6/4/18)
Ezetimibe (Zetia) 10 MG TABLET   1 TAB PO DAILY CHOLESTEROL  (Reported)
Fluticasone/Vilanterol (Breo Ellipta 100-25 Mcg INH) 100 MCG-25 MCG/DOSE 
BLST.W.DEV   1 PUFF INH DAILY COPD  (Reported)
Furosemide 20 MG TABLET   20 MG PO EOD HEART  (Reported)
Levothyroxine Sodium 50 MCG TABLET   1 TAB PO DAILY THYROID  (Reported)
Metoprolol Succ XL (Toprol Xl) 50 MG TAB.ER.24H   1 TAB PO DAILY AFIB  (Reported
)
Mirtazapine 15 MG TABLET   1 TAB PO QPM PRN SLEEP  (Reported)
Omeprazole 40 MG CAPSULE.DR   1 TAB PO DAILY HEARTBURN  (Reported)
Ranolazine (Ranexa) 500 MG TAB.ER.12H   1,000 MG PO BID ANGINA  (Reported)
Rosuvastatin Calcium (Crestor) 10 MG TABLET   1 TAB PO DAILY HEART HEALTH  (
Reported)
Sennosides (Senna) 8.6 MG TABLET   8.6 MG PO PRN BOWELS  (Reported)
Umeclidinium Bromide (Incruse Ellipta) 62.5 MCG/ACTUATION BLST.W.DEV   1 PUFF 
INH DAILY COPD  (Reported)
 
Current Medications:
 Current Medications
 
 
  Sig/Enrique Start time  Last
 
Medication Dose Route Stop Time Status Admin
 
Acetaminophen 650 MG Q4P PRN 06/11 0830 AC 06/11
 
  PO   0840
 
Acetaminophen 650 MG ONCE ONE 06/11 0145 DC 06/11
 
  PO 06/11 0146  0140
 
Albuterol Sulfate 3 ML BID 06/11 0900 AC 06/11
 
  INH   0850
 
Albuterol Sulfate 2 PUF Q4 06/10 1800 AC 06/11
 
  INH   0641
 
Albuterol Sulfate 3 ML ONCE ONE 06/10 1400 DC 06/10
 
  INH 06/10 1401  1400
 
Alprazolam 0.5 MG BID 06/10 2100 AC 06/11
 
  PO 06/17 2059  0840
 
Amiodarone HCl 100 MG Q48H 06/10 1745 AC 06/10
 
  PO   2154
 
Atorvastatin Calcium 40 MG 1700 06/11 1700 AC 
 
  PO   
 
Budesonide/ 2 PUF BID 06/10 2100 AC 06/10
 
Formoterol Fumarate  INH   2158
 
Cholecalciferol 3,000 IU DAILY 06/11 0900 AC 06/11
 
  PO   0945
 
Diphenhydramine HCl 25 MG AT BEDTIME PRN 06/10 2100 DC 06/10
 
  PO   2154
 
Ezetimibe 10 MG DAILY 06/11 0900 AC 06/11
 
  PO   0945
 
Furosemide 20 MG Q48H 06/10 1745 AC 
 
  PO   
 
Furosemide 0 .STK-MED ONE 06/10 1443 DC 
 
  IV   
 
Furosemide 40 MG ONCE ONE 06/10 1430 DC 06/10
 
  IV 06/10 1431  1455
 
Heparin Sodium  5,000 UNIT Q8 06/10 2200 DC 06/11
 
(Porcine)  SC   0641
 
Hydroxyzine HCl 50 MG AT BEDTIME AS NEED.. 06/11 0945 AC 
 
  PO   
 
Ipratropium Bromide 2.5 ML ONCE ONE 06/10 1400 DC 06/10
 
  INH 06/10 1401  1400
 
Levothyroxine Sodium 0.05 MG DAILY AC 06/11 0700 AC 06/11
 
  PO   0630
 
Melatonin 5 MG AT BEDTIME 06/11 2100 CAN 
 
  PO   
 
Metoprolol Succinate 50 MG DAILY 06/11 0900 AC 
 
  PO   
 
Mirtazapine 15 MG QPM PRN 06/10 1745 AC 
 
  PO   
 
Omeprazole 40 MG DAILY AC 06/11 0700 AC 06/11
 
  PO   0629
 
Ramelteon 8 MG AT BEDTIME 06/11 2100 AC 
 
  PO   
 
Ranolazine 1,000 MG BID 06/10 2100 AC 06/11
 
  PO   0945
 
Senna/Docusate Sodium 1 TAB DAILY PRN 06/10 1745 AC 
 
  PO   
 
Sodium Polystyrene  0 .STK-MED ONE 06/10 1615 DC 
 
Sulfonate  .ROUTE   
 
Sodium Polystyrene  60 ML ONCE ONE 06/10 1600 DC 06/10
 
Sulfonate  PO 06/10 1601  1611
 
Tiotropium [place] 1 PUF DAILY 06/10 1739 AC 06/11
 
  INH   0944
 
 
 
 
Review of Systems
Comments
18 point review of systems was performed and reviewed. Please see pertinent 
positives and pertinent negatives in the HPI. Otherwise ROS is negative.
 
Past History
 
Travel History
Traveled to [place] past 21 day No
 
Medical History
Neurological: NONE
EENT: NONE
Cardiovascular: CAD, hypertension, hyperlipidemia, myocardial infarction, STENTS
Respiratory: pneumonia, pulmonary fibrosis, LUNG CA ILD
Gastrointestinal: peptic ulcer disease
Hepatic: NONE
Renal: chronic kidney disease
Musculoskeletal: disk herniation
Psychiatric: NONE, anxiety
Endocrine: NONE
Blood Disorders: anemia
Cancer(s): lung cancer
GYN/Reproductive: NONE
 
Surgical History
Surgical History: STENTS (5) HYSTERECTOMY,LAMINECTOMY
 
Family History
Relations & Conditions If Any:
MOTHER (Leukemia).
FATHER (Heart Attack).
SISTER (ovarian cancer).
BROTHER (Diabetes).
 
 
Psychosocial History
Where Do You Live? Assisted Living
Who Do You Live With? self
Services at Home: Nursing, Physical Therapy
Primary Language: English
Smoking Status: Former Smoker
ETOH Use: denies use
Illicit Drug Use: denies illicit drug use
 
Functional Ability
ADLs
Independent: dressing, eating, toileting, bathing. 
Ambulation: cane
IADLs
Independent: shopping, housework, finances, food prep, telephone, transportation
, medication admin. 
 
Exam & Diagnostic Data
Last 24 Hrs of Vital Signs/I&O
 Vital Signs
 
 
Date Time Temp Pulse Resp B/P B/P Pulse O2 O2 Flow FiO2
 
     Mean Ox Delivery Rate 
 
06/11 0945  73  114/51     
 
06/11 0918      92 Nasal 55% 
 
       Cannula  
 
06/11 0702 98.4 68 18 116/66  99 Non  
 
       ReBreather  
 
06/11 0112      92 Nasal 55% 
 
       Cannula  
 
06/11 0000       Nasal 55% 
 
       Cannula  
 
06/10 2230 99.9 78 18 104/58  92 Part  
 
       ReBreather  
 
06/10 2155  81  134/64     
 
06/10 2154  81  134/64     
 
06/10 2130       Nasal 5.0L 
 
       Cannula  
 
06/10 1829 98.3 81 18 134/64  92 Nasal 5.0L 
 
       Cannula  
 
06/10 1810       Nasal 3.0L 
 
       Cannula  
 
06/10 1735 97.4  28      
 
06/10 1731  88 24 140/68  92 Nasal 5.0L 
 
       Cannula  
 
06/10 1424      91 Nasal 5.0L 
 
       Cannula  
 
06/10 1336      86 Nasal 5.0L 
 
       Cannula  
 
06/10 1308 97.9 75 22 180/70  86 Nasal 5.0L 
 
       Cannula  
 
 
 Intake & Output
 
 
 06/11 1600 06/11 0800 06/11 0000
 
Intake Total  200 300
 
Output Total  350 450
 
Balance  -150 -150
 
    
 
Intake, Oral  200 300
 
Output, Urine  350 450
 
Patient   124 lb
 
Weight   
 
Weight   Bed scale
 
Measurement   
 
Method   
 
 
 
 
Physical Exam
Other Physical Findings:
gen-aaox3
head/neck-high flow o2
cvs-s1,s2
lungs-dimnished bs bilaterally
abd-soft,bs+
ext-without edema
Last 48 Hrs of Labs/Stephon:
 Laboratory Tests
 
06/11/18 0654:
Anion Gap 14, Estimated GFR 25  L, BUN/Creatinine Ratio 15.5, Troponin I 0.01, 
CBC w Diff NO MAN DIFF REQ, RBC 2.65  L, MCV 80.9  L, MCH 25.8  L, MCHC 31.9  L,
RDW 19.7  H, MPV 8.9, Gran % 74.9, Lymphocytes % 9.8  L, Monocytes % 12.0  H, 
Eosinophils % 3.2, Basophils % 0.1, Absolute Granulocytes 6.1, Absolute 
Lymphocytes 0.8  L, Absolute Monocytes 1.0  H, Absolute Eosinophils 0.3, 
Absolute Basophils 0
 
06/10/18 1955:
Troponin I < 0.01
 
06/10/18 1615:
Lactic Acid Cancelled
 
06/10/18 1445:
Urine Color YEL, Urine Clarity CLEAR, Urine pH 7.0, Ur Specific Gravity 1.010, 
Urine Protein NEG, Urine Ketones NEG, Urine Nitrite NEG, Urine Bilirubin NEG, 
Urine Urobilinogen 0.2, Ur Leukocyte Esterase NEG, Ur Microscopic EXAM NOT 
REQUIRED, Urine Hemoglobin NEG, Urine Glucose NEG
 
06/10/18 1340:
Anion Gap 14, Estimated GFR 26  L, BUN/Creatinine Ratio 13.7, Glucose 101  H, 
Lactic Acid 1.7, Calcium 9.6, Magnesium 1.8, Total Bilirubin 0.5, AST 36, ALT 23
, Alkaline Phosphatase 143  H, Troponin I < 0.01, Pro-B-Natriuretic Pept 4570  H
, Total Protein 7.2, Albumin 4.0, Globulin 3.2, Albumin/Globulin Ratio 1.3, PT 
12.0, INR 1.10, APTT 29, CBC w Diff MAN DIFF ORDERED, RBC 3.08  L, MCV 81.7, MCH
25.6  L, MCHC 31.3  L, RDW 19.7  H, MPV 8.6, Segmented Neutrophils 87  H, 
Lymphocytes 2  L, Monocytes 9, Eosinophils 2, Nucleated RBCs 1  H, Polychromasia
1+, Hypochromic-Microcytic 2+, Poikilocytosis 1+, Anisocytosis 2+
 
 
Assessment/Plan
Impression/Plan:
Impression
68 year old woman
 
* acute on chronic hypoxemic respiratory failure
* lingular lung nodule increased in size
* anemia of chronic disease
* severe COPD with likely underlying exacerbation
 
Plan
-add solumedrol 40mg iv q12h
-reduce fio2 to a goal of >90%
-plan for IR biopsy tomorrow is questionable, it would be beneficial to achieve 
the procedure while hospitalized, however will depend on her o2 requirements
-getting transfused, pt also gets epogen
-trc/nebs
 
DVT prophylaxis at all times
 
 
Consult Acknowledgment
- Thank you for your consult request.

## 2018-06-12 VITALS — DIASTOLIC BLOOD PRESSURE: 52 MMHG | SYSTOLIC BLOOD PRESSURE: 116 MMHG

## 2018-06-12 VITALS — DIASTOLIC BLOOD PRESSURE: 92 MMHG | SYSTOLIC BLOOD PRESSURE: 108 MMHG

## 2018-06-12 VITALS — DIASTOLIC BLOOD PRESSURE: 62 MMHG | SYSTOLIC BLOOD PRESSURE: 156 MMHG

## 2018-06-12 LAB
ABSOLUTE GRANULOCYTE CT: 9 /CUMM (ref 1.4–6.5)
BASOPHILS # BLD: 0 /CUMM (ref 0–0.2)
BASOPHILS NFR BLD: 0 % (ref 0–2)
EOSINOPHIL # BLD: 0 /CUMM (ref 0–0.7)
EOSINOPHIL NFR BLD: 0.1 % (ref 0–5)
ERYTHROCYTE [DISTWIDTH] IN BLOOD BY AUTOMATED COUNT: 17.8 % (ref 11.5–14.5)
GRANULOCYTES NFR BLD: 93.6 % (ref 42.2–75.2)
HCT VFR BLD CALC: 25.5 % (ref 37–47)
LYMPHOCYTES # BLD: 0.6 /CUMM (ref 1.2–3.4)
MCH RBC QN AUTO: 26.4 PG (ref 27–31)
MCHC RBC AUTO-ENTMCNC: 32.2 G/DL (ref 33–37)
MCV RBC AUTO: 81.9 FL (ref 81–99)
MONOCYTES # BLD: 0 /CUMM (ref 0.1–0.6)
PLATELET # BLD: 322 /CUMM (ref 130–400)
PMV BLD AUTO: 8.9 FL (ref 7.4–10.4)
RED BLOOD CELL CT: 3.11 /CUMM (ref 4.2–5.4)
WBC # BLD AUTO: 9.6 /CUMM (ref 4.8–10.8)

## 2018-06-12 PROCEDURE — 0BBH3ZX EXCISION OF LUNG LINGULA, PERCUTANEOUS APPROACH, DIAGNOSTIC: ICD-10-PCS

## 2018-06-12 NOTE — PN- CARDIOLOGY
Subjective
Subjective:
Patient is very anxious regarding her upcoming biopsy today.
She complains of mild shortness of breath but denies chest pain.
Review of Systems:
Eyes no blurred or double vision
Ears no deafness or ringing
Nose and throat no recurrent sinusitis
Lungs per history of present illness
Heart per history of present illness
Abdomen no nausea vomiting
Musculoskeletal occasional muscle and joint pains
Psych no anxiety or depression
Neuro without recurrent headache or seizures
Endocrine no heat or cold intolerance
 
Objective
Vital Signs and I&Os
Vital Signs
 
 
Date Time Temp Pulse Resp B/P B/P Pulse O2 O2 Flow FiO2
 
     Mean Ox Delivery Rate 
 
06/12 0906 98.1 97 20 156/62     
 
06/12 0905 98.1 88 20 156/62     
 
06/12 0825      96 Nasal 5.0L 
 
       Cannula  
 
06/12 0800      99 Nasal 55% 
 
       Cannula  
 
06/12 0714 98.1 88 20 156/62  99 Nasal  
 
       Cannula  
 
06/12 0025      95 Nasal 55% 
 
       Cannula  
 
06/11 2327       Nasal 55% 
 
       Cannula  
 
06/11 2251      96 Nasal 55% 
 
       Cannula  
 
06/11 2200 97.2 94 20 116/56  97 Nasal  
 
       Cannula  
 
06/11 1914       Nasal 55% 
 
       Cannula  
 
06/11 1658      94 Nasal 55% 
 
       Cannula  
 
06/11 1600      93 Nasal 55% 
 
       Cannula  
 
06/11 1447      93 Nasal 55% 
 
       Cannula  
 
06/11 1416 98.1 69 20 94/50  99 Nasal  
 
       Cannula  
 
06/11 0945  73  114/51     
 
 
 Intake & Output
 
 
 06/12 1600 06/12 0800 06/12 0000 06/11 1600 06/11 0800 06/11 0000
 
Intake Total  50 250 780 200 300
 
Output Total   250 300 350 450
 
Balance  50 0 480 -150 -150
 
       
 
Intake, Blood    350  
 
Product      
 
Intake, IV   150 30  
 
Intake, Oral  50 100 400 200 300
 
Number   0 0  
 
Bowel      
 
Movements      
 
Output, Urine   250 300 350 450
 
Patient   125 lb   124 lb
 
Weight      
 
Weight      Bed scale
 
Measurement      
 
Method      
 
 
 
Physical Exam:
Patient is a well-developed well-nourished female appearing in no acute distress
HEENT is unremarkable
Neck is supple there is no JVD
Lungs few rales at the right base
Heart regular rhythm S1 and S2 are normal no murmurs gallops or rubs
Abdomen bowel sounds positive
Extremities without edema
Neuro without focal deficits
Psych.  Anxiety
Lymph no adenopathy
Skin warm and dry
Current Medications:
 Current Medications
 
 
  Sig/Enrique Start time  Last
 
Medication Dose Route Stop Time Status Admin
 
Acetaminophen 650 MG Q4P PRN 06/11 0830 AC 06/11
 
  PO   0840
 
Albuterol Sulfate 3 ML BID 06/11 0900 AC 06/12
 
  INH   0823
 
Albuterol Sulfate 2 PUF Q4 06/10 1800 AC 06/12
 
  INH   0913
 
Alprazolam 0.5 MG ONCE ONE 06/11 1615 DC 06/11
 
  PO 06/11 1616  1619
 
Alprazolam 0.5 MG BID 06/10 2100 AC 06/12
 
  PO 06/17 2059  0903
 
Amiodarone HCl 100 MG Q48H 06/10 1745 AC 06/10
 
  PO   2154
 
Atorvastatin Calcium 40 MG 1700 06/11 1700 AC 06/11
 
  PO   1619
 
Azithromycin 250 MG DAILY 06/12 0900 AC 06/12
 
  PO 06/15 0901  0905
 
Azithromycin 500 MG ONCE ONE 06/11 1400 DC 06/11
 
Sodium Chloride 250 ML IV 06/11 1459  1605
 
Budesonide/ 2 PUF BID 06/10 2100 AC 06/12
 
Formoterol Fumarate  INH   0901
 
Cholecalciferol 3,000 IU DAILY 06/11 0900 AC 06/12
 
  PO   0905
 
Diphenhydramine HCl 25 MG AT BEDTIME PRN 06/10 2100 DC 06/10
 
  PO   2154
 
Ezetimibe 10 MG DAILY 06/11 0900 AC 06/12
 
  PO   0905
 
Furosemide 20 MG Q48H 06/10 1745 AC 
 
  PO   
 
Hydroxyzine HCl 50 MG AT BEDTIME AS NEED.. 06/11 0945 AC 
 
  PO   
 
Levothyroxine Sodium 0.05 MG DAILY AC 06/11 0700 AC 06/12
 
  PO   0513
 
Melatonin 5 MG AT BEDTIME 06/11 2100 CAN 
 
  PO   
 
Methylprednisolone 40 MG Q12 06/11 1230 AC 06/12
 
  IV   0906
 
Metoprolol Succinate 50 MG DAILY 06/11 0900 AC 06/12
 
  PO   0906
 
Mirtazapine 15 MG QPM PRN 06/10 1745 AC 
 
  PO   
 
Omeprazole 40 MG DAILY AC 06/11 0700 AC 06/12
 
  PO   0513
 
Patient Medication  1 ED ONE ONE 06/11 1300 DC 
 
Teaching  ED 06/11 1301  
 
Ramelteon 8 MG AT BEDTIME 06/11 2100 AC 06/11
 
  PO   2157
 
Ranolazine 1,000 MG BID 06/10 2100 AC 06/12
 
  PO   0905
 
Senna/Docusate Sodium 1 TAB DAILY PRN 06/10 1745 AC 
 
  PO   
 
Tiotropium Bromide 1 PUF DAILY 06/10 1739 AC 06/12
 
  INH   0901
 
 
 
 
Results
Last 48 Hrs of Labs/Mics:
 Laboratory Tests
 
06/12/18 0639:
Anion Gap 13, Estimated GFR 22  L, BUN/Creatinine Ratio 18.2, CBC w Diff NO MAN 
DIFF REQ, RBC 3.11  L, MCV 81.9, MCH 26.4  L, MCHC 32.2  L, RDW 17.8  H, MPV 8.9
, Gran % 93.6  H, Lymphocytes % 5.9  L, Monocytes % 0.4  L, Eosinophils % 0.1, 
Basophils % 0, Absolute Granulocytes 9.0  H, Absolute Lymphocytes 0.6  L, 
Absolute Monocytes 0  L, Absolute Eosinophils 0, Absolute Basophils 0
 
06/11/18 1600:
CBC w Diff NO MAN DIFF REQ, RBC 3.15  L, MCV 81.3, MCH 26.1  L, MCHC 32.2  L, 
RDW 18.0  H, MPV 8.8, Gran % 71.1, Lymphocytes % 16.9  L, Monocytes % 8.9, 
Eosinophils % 3.1, Basophils % 0, Absolute Granulocytes 5.6, Absolute 
Lymphocytes 1.3, Absolute Monocytes 0.7  H, Absolute Eosinophils 0.2, Absolute 
Basophils 0
 
06/11/18 0654:
Anion Gap 14, Estimated GFR 25  L, BUN/Creatinine Ratio 15.5, Troponin I 0.01, 
CBC w Diff NO MAN DIFF REQ, RBC 2.65  L, MCV 80.9  L, MCH 25.8  L, MCHC 31.9  L,
RDW 19.7  H, MPV 8.9, Gran % 74.9, Lymphocytes % 9.8  L, Monocytes % 12.0  H, 
Eosinophils % 3.2, Basophils % 0.1, Absolute Granulocytes 6.1, Absolute 
Lymphocytes 0.8  L, Absolute Monocytes 1.0  H, Absolute Eosinophils 0.3, 
Absolute Basophils 0
 
06/10/18 1955:
Troponin I < 0.01
 
06/10/18 1615:
Lactic Acid Cancelled
 
06/10/18 1445:
Urine Color YEL, Urine Clarity CLEAR, Urine pH 7.0, Ur Specific Gravity 1.010, 
Urine Protein NEG, Urine Ketones NEG, Urine Nitrite NEG, Urine Bilirubin NEG, 
Urine Urobilinogen 0.2, Ur Leukocyte Esterase NEG, Ur Microscopic EXAM NOT 
REQUIRED, Urine Hemoglobin NEG, Urine Glucose NEG
 
06/10/18 1340:
Anion Gap 14, Estimated GFR 26  L, BUN/Creatinine Ratio 13.7, Glucose 101  H, 
Lactic Acid 1.7, Calcium 9.6, Magnesium 1.8, Total Bilirubin 0.5, AST 36, ALT 23
, Alkaline Phosphatase 143  H, Troponin I < 0.01, Pro-B-Natriuretic Pept 4570  H
, Total Protein 7.2, Albumin 4.0, Globulin 3.2, Albumin/Globulin Ratio 1.3, PT 
12.0, INR 1.10, APTT 29, CBC w Diff MAN DIFF ORDERED, RBC 3.08  L, MCV 81.7, MCH
25.6  L, MCHC 31.3  L, RDW 19.7  H, MPV 8.6, Segmented Neutrophils 87  H, 
Lymphocytes 2  L, Monocytes 9, Eosinophils 2, Nucleated RBCs 1  H, Polychromasia
1+, Hypochromic-Microcytic 2+, Poikilocytosis 1+, Anisocytosis 2+
 
 
Telemetry personally reviewed sinus rhythm
 
Assessment/Plan
Assessment/Plan
1. Shortness of breath/weakness, likely multifactorial improved
2. CAD with prior PCI (2001/2014) and prior STEMI; tx with BEN to the RCA 4/2016
3. Hx COPD/ILD/lung cancer s/p gamma knife radiation
4. hx PAF not on AC due to hx of GI bleed and anemia, In SR on low dose 
Amiodarone
5. CKD
6. Hyperkalemia, improved
7. Acute on chronic anemia
8. History of angina on Ranexa
 
Recommendation
1.  Continue to monitor H&H and transfuse as needed
2.  Plan is for biopsy of pulmonary nodule today
3.  Echocardiogram is pending
 
 
Continue telemetry? Yes

## 2018-06-12 NOTE — ECHOCARDIOGRAM REPORT
CHRIS ELDRIDGE 
 
 Age:    68     :    1950      Gender:     F 
 
 MRN:    [id number] 
 
 Exam Date:     2018  
                19:15 
 
 [place]am [place]   
 [place] 
 
 Ht (in):     60      Wt (lb):      124     BSA:    1.55 
 
 BP:          94      /     50 
 
 Ordering Physician:        Schwaber, Eric, MD 
 
 Referring Physician:       Leonidas Pacheco M.D. 
 
 Technologist:              Angeles Arauz [facility] 
 
 Room Number:               174-01 
 
 Indications:       SHORTNESS OF BREATH 
 
 Rhythm:                 Sinus 
 
 Technical Quality:      fair 
 
 FINDINGS 
 
 Left Ventricle 
 Normal global left ventricular size, wall thickness, systolic  
 function with no obvious regional wall motion abnormalities. Normal  
 left ventricular ejection fraction estimated at 60-65%. 
 
 Right Ventricle 
 Normal right ventricular size and function. 
 
 Right Atrium 
 Normal right atrial size. 
 
 Left Atrium 
 Left atrial size at the upper limits of normal. 
 
 Mitral Valve 
 Mild mitral annular calcification. Trace to mild mitral  
 regurgitation. 
 
 Aortic Valve 
 Diffuse thickening (sclerosis) of the aortic valve cusps without  
 reduced excursion. Trace to mild aortic regurgitation. 
 
 Tricuspid Valve 
 Tricuspid valve is normal in structure and function. Mild tricuspid  
 regurgitation. Right ventricular systolic pressure estimated to be  
 at upper limits of normal at 28   mmHg. 
 
 Pulmonic Valve 
 Pulmonic valve not well visualized, grossly normal. 
 
 Pericardium 
 No pericardial effusion. 
 
 Great Vessels 
 Normal size aortic root. 
 
 CONCLUSIONS 
 Normal left and right ventricular systolic function. No significant  
 valvular abnormalities noted. 
 
 Diogo Murray M.D. 
 (Electronically Signed) 
 Final Date:      2018  
                  14:29 
 
 MEASUREMENTS  (Male / Female) Normal Values 
 
 2D ECHO 
 LV Diastolic Diameter PLAX        3.8 cm                4.2 - 5.9 / 3.9 - 5.3 
cm 
 LV Systolic Diameter PLAX         1.8 cm                2.1 - 4.0 cm 
 LV Fractional Shortening PLAX     52.6 %                25 - 46  % 
 LV Ejection Fraction 2D Teich     84.3 %                 
 IVS Diastolic Thickness           1.0 cm                 
 LVPW Diastolic Thickness          1.0 cm                 
 LV Relative Wall Thickness        0.5                    
 RV Internal Dim ED PLAX           2.3 cm                1.9 - 3.8 cm 
 LVOT Diameter                     1.8 cm                 
 Aortic Root Diameter              2.9 cm                 
 LA Systolic Diameter LX           3.0 cm                3.0 - 4.0 / 2.7 - 3.8 
cm 
 LA Volume                         38.0 cm              18 - 58 / 22 - 52 cm 
 Ascending Aorta Diameter          2.9 cm                 
 
 DOPPLER 
 AV Peak Velocity                  139.0 cm/s             
 AV Peak Gradient                  7.7 mmHg               
 AV Mean Velocity                  102.0 cm/s             
 AV Mean Gradient                  5.0 mmHg               
 AV Velocity Time Integral         30.4 cm                
 LVOT Peak Velocity                98.6 cm/s              
 LVOT Peak Gradient                3.9 mmHg               
 LVOT Mean Velocity                66.6 cm/s              
 LVOT Mean Gradient                2.0 mmHg               
 LVOT Velocity Time Integral       20.1 cm                
 LVOT Stroke Volume                51.1 cm               
 AV Area Cont Eq vti               1.7 cm                
 AV Area Cont Eq pk                1.8 cm                
 MV Peak Velocity                  107.0 cm/s             
 MV Peak Gradient                  4.6 mmHg               
 MV Mean Velocity                  56.2 cm/s              
 MV Mean Gradient                  2.0 mmHg               
 Mitral E Point Velocity           70.1 cm/s              
 Mitral A Point Velocity           92.8 cm/s              
 Mitral E to A Ratio               0.8                    
 MV PHT Velocity                   79.9 cm/s              
 MV Deceleration [place]ila             390.0 cm/s            
 MV Pressure Half Time             61.5 ms                
 MV Area PHT                       3.6 cm                
 MV Deceleration Time              269.0 ms               
 TR Peak Velocity                  232.0 cm/s             
 TR Peak Gradient                  21.5 mmHg              
 Right Atrial Pressure             5.0 mmHg               
 Pulmonary Artery Systolic Pressu  26.5 mmHg              
 Right Ventricular Systolic Press  26.5 mmHg              
 PV Peak Velocity                  99.3 cm/s              
 PV Peak Gradient                  3.9 mmHg               
 PV Mean Velocity                  60.3 cm/s              
 PV Mean Gradient                  2.0 mmHg               
 PV Velocity Time Integral         19.7 cm                
 LV E' Lateral Velocity            6.9 cm/s               
 Mitral E to LV E' Lateral Ratio   10.1                   
 LV E' Septal Velocity             5.0 cm/s               
 Mitral E to LV E' Septal Ratio    14.1

## 2018-06-12 NOTE — RADIOLOGY REPORT
EXAMINATION:\H\
\N\XR CHEST
 
CLINICAL INFORMATION:
Status post left lung biopsy
 
COMPARISON:
Chest CT 06/10/2018
 
TECHNIQUE:
Frontal view of the chest was obtained.
 
FINDINGS:
Diffusely reticulated in coarsened interstitial lung markings bilaterally
consistent with known emphysematous changes. No evidence of pneumothorax. No
pleural effusion. Fiducial markers project over the right lower lung.
 
Heart size is normal.
 
No acute or suspicious osseous abnormality.
 
IMPRESSION:
No evidence of complications status post left lung biopsy.
 
Chronic lung changes.

## 2018-06-12 NOTE — PN- HOUSESTAFF
**See Addendum**
Subjective
Follow-up For:
Dyspnea, acute blood loss anemia
Tele-Events Since Last Visit:
Sinus rhythm, 6070
Subjective:
No overnight events.  Patient is still requiring high flow oxygen.  When I told 
her that this likely means she will not get the biopsy today, she became very 
tearful.  She has been waiting a long time for the biopsy and would like to know
what is going on.  Otherwise, her breathing has been good with a high flow and 
she has no pain.
 
Review of Systems
Constitutional:
Reports: no symptoms. 
EENTM:
Reports: no symptoms. 
Cardiovascular:
Reports: no symptoms. 
Respiratory:
Reports: see HPI. 
Gastrointestinal:
Reports: no symptoms. 
Genitourinary:
Reports: no symptoms. 
Musculoskeletal:
Reports: no symptoms. 
Skin:
Reports: no symptoms. 
Neurological/Psychological:
Reports: no symptoms. 
Hematologic/Endocrine:
Reports: no symptoms. 
Immunologic/Allergic:
Reports: no symptoms. 
 
Objective
Last 24 Hrs of Vital Signs/I&O
 Vital Signs
 
 
Date Time Temp Pulse Resp B/P B/P Pulse O2 O2 Flow FiO2
 
     Mean Ox Delivery Rate 
 
 0025      95 Nasal 55% 
 
       Cannula  
 
 2327       Nasal 55% 
 
       Cannula  
 
 2251      96 Nasal 55% 
 
       Cannula  
 
 2200 97.2 94 20 116/56  97 Nasal  
 
       Cannula  
 
 1914       Nasal 55% 
 
       Cannula  
 
 1658      94 Nasal 55% 
 
       Cannula  
 
 1600      93 Nasal 55% 
 
       Cannula  
 
 1447      93 Nasal 55% 
 
       Cannula  
 
 1416 98.1 69 20 94/50  99 Nasal  
 
       Cannula  
 
 0945  73  114/51     
 
 0918      92 Nasal 55% 
 
       Cannula  
 
 0800      92  55% 
 
 
 Intake & Output
 
 
  0800  0000  1600
 
Intake Total 50 250 780
 
Output Total  250 300
 
Balance 50 0 480
 
    
 
Intake, Blood   350
 
Product   
 
Intake, IV  150 30
 
Intake, Oral 50 100 400
 
Number  0 0
 
Bowel   
 
Movements   
 
Output, Urine  250 300
 
Patient  56.699 kg 
 
Weight   
 
 
 
 
Physical Exam
General Appearance: Alert, Oriented X3, Cooperative, No Acute Distress
Cardiovascular: Regular Rate, Normal S1, Normal S2
Lungs: crackles
Abdomen: Normal Bowel Sounds, Soft, No Tenderness
Extremities: No Edema, Normal Pulses, No Tenderness/Swelling
Current Medications:
 Current Medications
 
 
  Sig/Enrique Start time  Last
 
Medication Dose Route Stop Time Status Admin
 
Acetaminophen 650 MG Q4P PRN  0830 AC 
 
  PO   0840
 
Albuterol Sulfate 3 ML BID  0900 AC 
 
  INH   0850
 
Albuterol Sulfate 2 PUF Q4 06/10 1800 AC 
 
  INH   2157
 
Alprazolam 0.5 MG ONCE ONE  1615 DC 
 
  PO  1616  1619
 
Alprazolam 0.5 MG BID 06/10 2100 AC 
 
  PO  205  205
 
Amiodarone HCl 100 MG Q48H 06/10 1745 AC 06/10
 
  PO   2154
 
Atorvastatin Calcium 40 MG 1700  1700 AC 
 
  PO   1619
 
Azithromycin 250 MG DAILY  0900 AC 
 
  PO 06/15 0901  
 
Azithromycin 500 MG ONCE ONE  1400 DC 
 
Sodium Chloride 250 ML IV  1459  1605
 
Budesonide/ 2 PUF BID 06/10 2100 AC 
 
Formoterol Fumarate  INH   
 
Cholecalciferol 3,000 IU DAILY  0900 AC 
 
  PO   0945
 
Diphenhydramine HCl 25 MG AT BEDTIME PRN 06/10 2100 DC 06/10
 
  PO   2154
 
Ezetimibe 10 MG DAILY  0900 AC 
 
  PO   0945
 
Furosemide 20 MG Q48H 06/10 174 AC 
 
  PO   
 
Heparin Sodium  5,000 UNIT Q8 06/10 2200 DC 
 
(Porcine)  SC   0641
 
Hydroxyzine HCl 50 MG AT BEDTIME AS NEED..  0945 AC 
 
  PO   
 
Levothyroxine Sodium 0.05 MG DAILY AC  0700 AC 
 
  PO   0513
 
Melatonin 5 MG AT BEDTIME  2100 CAN 
 
  PO   
 
Methylprednisolone 40 MG Q12  1230 AC 
 
  IV   2058
 
Metoprolol Succinate 50 MG DAILY  0900 AC 
 
  PO   0945
 
Mirtazapine 15 MG QPM PRN 06/10 1745 AC 
 
  PO   
 
Omeprazole 40 MG DAILY AC  0700 AC 
 
  PO   0513
 
Patient Medication  1 ED ONE ONE  1300 DC 
 
Teaching  ED  1301  
 
Ramelteon 8 MG AT BEDTIME  2100 AC 
 
  PO   2157
 
Ranolazine 1,000 MG BID 06/10 2100 AC 
 
  PO   205
 
Senna/Docusate Sodium 1 TAB DAILY PRN 06/10 1745 AC 
 
  PO   
 
Tiotropium Bromide 1 PUF DAILY 06/10 1739 AC 
 
  INH   0944
 
 
 
 
Last 24 Hrs of Lab/Stephon Results
Last 24 Hrs of Labs/Mics:
 Laboratory Tests
 
18 0639:
Sodium Pending, Potassium Pending, Chloride Pending, Carbon Dioxide Pending, 
Anion Gap Pending, BUN Pending, Creatinine Pending, BUN/Creatinine Ratio Pending
, CBC w Diff Pending, WBC Pending, RBC Pending, Hgb Pending, Hct Pending, MCV 
Pending, MCH Pending, MCHC Pending, RDW Pending, Plt Count Pending, MPV Pending
 
18 1600:
CBC w Diff NO MAN DIFF REQ, RBC 3.15  L, MCV 81.3, MCH 26.1  L, MCHC 32.2  L, 
RDW 18.0  H, MPV 8.8, Gran % 71.1, Lymphocytes % 16.9  L, Monocytes % 8.9, 
Eosinophils % 3.1, Basophils % 0, Absolute Granulocytes 5.6, Absolute 
Lymphocytes 1.3, Absolute Monocytes 0.7  H, Absolute Eosinophils 0.2, Absolute 
Basophils 0
 
 
Assessment/Plan
Assessment:
Ms. Landers is a 68-year-old female with past medical history of coronary 
artery disease status post MI with multiple stents followed by Dr. Castrejon, 
hypertension, hyperlipidemia, squamous cell lung cancer status post gamma knife 
followed by Dr. Corcoran, interstitial lung disease on 3 L oxygen, chronic kidney 
disease followed by Dr. Chacon, paroxysmal atrial fibrillation not on 
anticoagulation due to a GI bleed and anxiety who presented with shortness of 
breath.  
 
Problem list:
1.  Acute on chronic hypoxic respiratory failure with extensive pulmonary 
disease history possibly secondary to COPD exacerbation
2.  Chest pain syndrome
3.  Hyperkalemia
4.  Acute blood loss anemia
 
#Acute on chronic hypoxic respiratory failure with extensive pulmonary disease 
history possibly secondary to COPD exacerbation: Patient has an extensive long 
history including lung cancer and interstitial lung disease and presented with 
acutely worsening dyspnea.  She still requiring high flow oxygen. CT imaging is 
nonspecific and suggests multiple possible etiologies.  EKG and troponins 3 
have been negative.  Ultrasound right lower extremity negative.  Pulmonology 
evaluated and thinks there may be a component of COPD.  She was scheduled for 
biopsy today and the patient was very tearful when she heard that she may not be
able to have it today due to her oxygen requirements.  Will talk to IR about 
whether she can have it as an inpatient though when she gets better.
-Appreciate cardiology and pulmonology recommendations
-Daily weights, strict I's and O's
-Continue ranolazine
-Consider further diuresis
-Taper oxygen
-Continue steroids
-Continue azithromycin
 
#Hyperkalemia: Patient had hyperkalemia that was mild without peaked T waves.  
It has resolved after Kayexalate.
-Continue to monitor
 
#Acute blood loss anemia: Patient has a chronic anemia secondary to her chronic 
kidney disease for which he receives Epogen.  She also has history of GI bleed. 
She is not complaining of any hematochezia or melena recently.  Her hemoglobin 
dropped to 6.9 and she was transfused 1 unit of packed red blood cell on 2018.
-Guaiac all stools
-Continue Epogen
-Continue to monitor, hemoglobin goal greater than 8
 
#Chronic medical problems:
Continue other home medications
 
DVT prophylaxis with heparin
CHF diet
DNR/okay to intubate
Problem List:
 1. COPD (chronic obstructive pulmonary disease)
 
Pain Ratin
Pain Location:
ni
Pain Goal: Remain pain free
Pain Plan:
see a/p
Tomorrow's Labs & Rationales:
cbc

## 2018-06-12 NOTE — CT SCAN REPORT
PROCEDURE:
CT GUIDED LUNG BIOPSY
 
CLINICAL HISTORY:
This patient is a 68 years old female with growing mass in the lingula, who
presents to Interventional Radiology for core biopsy.
 
PROCEDURE:
1. Limited CT of the chest.
2. CT-guided core needle biopsy of a left-sided lung mass.
 
PHYSICIANS:
Dr. Mary Lou Cameron (attending)
 
MONITORING:
The procedure was performed with conscious sedation and analgesia under my
direct supervision. Continuous blood pressure, pulse oximetry as well as
heartrate monitoring was performed by an independent registered nurse.
 
Physician intraservice sedation time was 30 minutes.
 
MEDICATIONS:
1. Versed 1 mg and fentanyl 50 mcg IV were administered.
2. Lidocaine 1%, 6 mL, SQ.
 
COMPLICATIONS: None
ESTIMATED BLOOD LOSS: <5  mL
SPECIMENS: Lingula mass cores
IMPLANT: None
CONTRAST: None
 
TOTAL DLP: 428.05 mGy-cm
 
SITE MARKING:
As part of the preprocedure verification policy, a site marking procedure was
initiated. Due to the nature the procedure, the insertion site could not be
predetermined thus invoking the policy of exemption to site laterality and
marking. Insertion site marking was performed in the procedure room in
conjunction with imaging confirmation.
 
PROCEDURE NOTE:
Informed consent was obtained from the patient prior to the procedure. During
this process, the procedure and potential alternatives were explained along
with the intended outcome and benefits. The risks of the procedure, including
the possibility of an unsuccessful procedure, as well as the risk of not
doing the procedure, were discussed. The patient was given the opportunity to
ask questions regarding the procedure and appeared competent to make
decisions. A signed consent form documenting this discussion was placed in
the medical record. A time-out procedure was performed.
 
A localizing CT scan of the chest was performed. A suitable access site was
chosen. The skin was sterilely prepped and draped in usual fashion. All
elements of maximal sterile barrier technique followed including use of cap,
mask, sterile gown, sterile gloves, a sterile full body drape and hand
hygiene. Also followed skin preparation with 2% chlorhexidine for cutaneous
antisepsis, and sterile ultrasound preparation with sterile gel and probe
cover when applicable. The skin and subcutaneous tissues were then
anesthetized with 1% lidocaine. Anesthesia was carried down to the pleural
surface. Using CT guidance, a 19-gauge introducer needle was advanced just
proximal to the lesion. CT scan confirmed adequate positioning of the
introducer needle. Through this a 20-gauge [employer] core biopsy needle device
was introduced into the lesion. A total of 1 pass was made. The specimen
appeared to be composed of fat mostly with a small amount of lesional tissue.
Subsequently, a 19-gauge introducer needle was advanced proximal to the
lesion and through this a 20-gauge achieve core biopsy needle device was
introduced into the lesion confirmed on CT fluoroscopy. The specimen appeared
robust. The specimens were sent to histology for evaluation. The specimens
were marked appropriately before leaving the laboratory. Final pathologic
interpretation is pending.
 
The introducer needle was removed. Limited CT scan was obtained which showed
no evidence of complication. A sterile dressing was applied. The patient
tolerated the procedure well and was transferred to recovery in stable
condition.
 
A chest radiograph obtained 1 hour after the biopsy demonstrated no evidence
of pneumothorax.
 
FINDINGS:
Limited CT of the chest demonstrated left lung mass, with successful
CT-guided core needle biopsy.
 
IMPRESSION:
Successful CT-guided lung biopsy.
 
PLAN:
The patient was stable after the procedure and was transferred to the
interventional recovery area for a period of observation. The patient will be
transferred to the floor.

## 2018-06-12 NOTE — PN- PULMONARY
Subjective
HPI/Critical Care Issues:
pt seen and examined
awaiting IR guided lung biopsy
now down to nasal cannula
dyspnea at baseline
 
Objective
Current Medications:
 Current Medications
 
 
  Sig/Enrique Start time  Last
 
Medication Dose Route Stop Time Status Admin
 
Acetaminophen 650 MG Q4P PRN 06/11 0830 AC 06/11
 
  PO   0840
 
Albuterol Sulfate 3 ML BID 06/11 0900 AC 06/12
 
  INH   0823
 
Albuterol Sulfate 2 PUF Q4 06/10 1800 AC 06/12
 
  INH   0913
 
Alprazolam 0.5 MG ONCE ONE 06/11 1615 DC 06/11
 
  PO 06/11 1616  1619
 
Alprazolam 0.5 MG BID 06/10 2100 AC 06/12
 
  PO 06/17 2059  0903
 
Amiodarone HCl 100 MG Q48H 06/10 1745 AC 06/10
 
  PO   2154
 
Atorvastatin Calcium 40 MG 1700 06/11 1700 AC 06/11
 
  PO   1619
 
Azithromycin 250 MG DAILY 06/12 0900 AC 06/12
 
  PO 06/15 0901  0905
 
Azithromycin 500 MG ONCE ONE 06/11 1400 DC 06/11
 
Sodium Chloride 250 ML IV 06/11 1459  1605
 
Budesonide/ 2 PUF BID 06/10 2100 AC 06/12
 
Formoterol Fumarate  INH   0901
 
Cholecalciferol 3,000 IU DAILY 06/11 0900 AC 06/12
 
  PO   0905
 
Ezetimibe 10 MG DAILY 06/11 0900 AC 06/12
 
  PO   0905
 
Furosemide 20 MG Q48H 06/10 1745 AC 
 
  PO   
 
Hydroxyzine HCl 50 MG AT BEDTIME AS NEED.. 06/11 0945 AC 
 
  PO   
 
Levothyroxine Sodium 0.05 MG DAILY AC 06/11 0700 AC 06/12
 
  PO   0513
 
Methylprednisolone 40 MG Q12 06/11 1230 AC 06/12
 
  IV   0906
 
Metoprolol Succinate 50 MG DAILY 06/11 0900 AC 06/12
 
  PO   0906
 
Mirtazapine 15 MG QPM PRN 06/10 1745 AC 
 
  PO   
 
Omeprazole 40 MG DAILY AC 06/11 0700 AC 06/12
 
  PO   0513
 
Patient Medication  1 ED ONE ONE 06/11 1300 DC 
 
Teaching  ED 06/11 1301  
 
Ramelteon 8 MG AT BEDTIME 06/11 2100 AC 06/11
 
  PO   2157
 
Ranolazine 1,000 MG BID 06/10 2100 AC 06/12
 
  PO   0905
 
Senna/Docusate Sodium 1 TAB DAILY PRN 06/10 1745 AC 
 
  PO   
 
Tiotropium Bromide 1 PUF DAILY 06/10 1739 AC 06/12
 
  INH   0901
 
 
 
 
Vital Signs & I&O
Last 24 Hrs of Vitals and I&O:
 Vital Signs
 
 
Date Time Temp Pulse Resp B/P B/P Pulse O2 O2 Flow FiO2
 
     Mean Ox Delivery Rate 
 
06/12 0906 98.1 97 20 156/62     
 
06/12 0905 98.1 88 20 156/62     
 
06/12 0825      96 Nasal 5.0L 
 
       Cannula  
 
06/12 0800      99 Nasal 55% 
 
       Cannula  
 
06/12 0714 98.1 88 20 156/62  99 Nasal  
 
       Cannula  
 
06/12 0025      95 Nasal 55% 
 
       Cannula  
 
06/11 2327       Nasal 55% 
 
       Cannula  
 
06/11 2251      96 Nasal 55% 
 
       Cannula  
 
06/11 2200 97.2 94 20 116/56  97 Nasal  
 
       Cannula  
 
06/11 1914       Nasal 55% 
 
       Cannula  
 
06/11 1658      94 Nasal 55% 
 
       Cannula  
 
06/11 1600      93 Nasal 55% 
 
       Cannula  
 
06/11 1447      93 Nasal 55% 
 
       Cannula  
 
06/11 1416 98.1 69 20 94/50  99 Nasal  
 
       Cannula  
 
 
 Intake & Output
 
 
 06/12 1600 06/12 0800 06/12 0000
 
Intake Total  50 250
 
Output Total   250
 
Balance  50 0
 
    
 
Intake, IV   150
 
Intake, Oral  50 100
 
Number   0
 
Bowel   
 
Movements   
 
Output, Urine   250
 
Patient   125 lb
 
Weight   
 
 
 
 
Exam
Other Physical Findings:
gen-aaox3
head/neck-high flow o2
cvs-s1,s2
lungs-dimnished bs bilaterally
abd-soft,bs+
ext-without edema
 
Results
Last 24 Hrs of Lab Results:
 Laboratory Tests
 
06/12/18 0639:
Anion Gap 13, Estimated GFR 22  L, BUN/Creatinine Ratio 18.2, CBC w Diff NO MAN 
DIFF REQ, RBC 3.11  L, MCV 81.9, MCH 26.4  L, MCHC 32.2  L, RDW 17.8  H, MPV 8.9
, Gran % 93.6  H, Lymphocytes % 5.9  L, Monocytes % 0.4  L, Eosinophils % 0.1, 
Basophils % 0, Absolute Granulocytes 9.0  H, Absolute Lymphocytes 0.6  L, 
Absolute Monocytes 0  L, Absolute Eosinophils 0, Absolute Basophils 0
 
06/11/18 1600:
CBC w Diff NO MAN DIFF REQ, RBC 3.15  L, MCV 81.3, MCH 26.1  L, MCHC 32.2  L, 
RDW 18.0  H, MPV 8.8, Gran % 71.1, Lymphocytes % 16.9  L, Monocytes % 8.9, 
Eosinophils % 3.1, Basophils % 0, Absolute Granulocytes 5.6, Absolute 
Lymphocytes 1.3, Absolute Monocytes 0.7  H, Absolute Eosinophils 0.2, Absolute 
Basophils 0
 
 
Impression/Plan
 
Impression/Plan
Impression/Plan:
Impression
68 year old woman
 
* acute on chronic hypoxemic respiratory failure
* lingular lung nodule increased in size
* anemia of chronic disease
* severe COPD with likely underlying exacerbation
 
Plan
-continue solumedrol 40mg iv q12h for today, will taper tomorrow
-reduce fio2 to a goal of >90%
-plan for IR biopsy today
-monitor cbc, pt also gets epogen
-trc/nebs
 
DVT prophylaxis at all times

## 2018-06-13 VITALS — SYSTOLIC BLOOD PRESSURE: 112 MMHG | DIASTOLIC BLOOD PRESSURE: 60 MMHG

## 2018-06-13 VITALS — DIASTOLIC BLOOD PRESSURE: 68 MMHG | SYSTOLIC BLOOD PRESSURE: 140 MMHG

## 2018-06-13 VITALS — DIASTOLIC BLOOD PRESSURE: 50 MMHG | SYSTOLIC BLOOD PRESSURE: 108 MMHG

## 2018-06-13 LAB
ABSOLUTE GRANULOCYTE CT: 11 /CUMM (ref 1.4–6.5)
BASOPHILS # BLD: 0 /CUMM (ref 0–0.2)
BASOPHILS NFR BLD: 0 % (ref 0–2)
EOSINOPHIL # BLD: 0 /CUMM (ref 0–0.7)
EOSINOPHIL NFR BLD: 0.2 % (ref 0–5)
ERYTHROCYTE [DISTWIDTH] IN BLOOD BY AUTOMATED COUNT: 18.6 % (ref 11.5–14.5)
GRANULOCYTES NFR BLD: 91.9 % (ref 42.2–75.2)
HCT VFR BLD CALC: 24.6 % (ref 37–47)
LYMPHOCYTES # BLD: 0.6 /CUMM (ref 1.2–3.4)
MCH RBC QN AUTO: 26.2 PG (ref 27–31)
MCHC RBC AUTO-ENTMCNC: 31.8 G/DL (ref 33–37)
MCV RBC AUTO: 82.3 FL (ref 81–99)
MONOCYTES # BLD: 0.4 /CUMM (ref 0.1–0.6)
PLATELET # BLD: 356 /CUMM (ref 130–400)
PMV BLD AUTO: 9 FL (ref 7.4–10.4)
RED BLOOD CELL CT: 2.99 /CUMM (ref 4.2–5.4)
WBC # BLD AUTO: 12 /CUMM (ref 4.8–10.8)

## 2018-06-13 NOTE — DISCHARGE SUMMARY
Visit Information
 
Visit Dates
Admission Date:
06/10/18
 
Discharge Date:
6/14/18
 
 
Hospital Course
 
Course
Attending Physician:
Brittany Villarreal MD
 
Primary Care Physician:
Jaciel Mari MD
 
Hospital Course:
Ms. Landers is a 68-year-old female with past medical history of coronary 
artery disease status post MI with multiple stents followed by Dr. Castrejon, 
hypertension, hyperlipidemia, squamous cell lung cancer status post gamma knife 
followed by Dr. Corcoran, interstitial lung disease on 3 L oxygen, chronic kidney 
disease followed by Dr. Chacon, paroxysmal atrial fibrillation not on 
anticoagulation due to a GI bleed and anxiety who presented with shortness of 
breath.  
 
On presentation, vital signs were T 97.9, HR 75, RR 22, /70, saturating 86
% on 5 L nasal cannula.  Laboratories were significant for white blood cell 
count of 11.2, hemoglobin 7.9, MCV 81.7, potassium 5.7, BUN 26, creatinine 1.9 (
baseline 2.2), lactic acid 1.7, calcium 9.6, negative LFTs, BNP 4570.  
Urinalysis was negative.  Chest x-ray shows bibasilar airspace opacities right 
greater than left with small bilateral pleural effusions and mild engorgement of
the pulmonary vasculature suggesting volume overload.  CT chest without contrast
shows diffuse central lobar and paraseptal emphysema with some tonic 
interstitial lung disease, interval increase in interstitial disease, and 
interval enlargement of the lingular mass.
 
She was admitted to telemetry and treated for the following problems:
1.  Acute on chronic hypoxic respiratory failure with extensive pulmonary 
disease history possibly secondary to COPD exacerbation
2.  Chest pain syndrome
3.  Hyperkalemia
4.  Acute blood loss anemia
5.  Anxiety
 
#Acute on chronic hypoxic respiratory failure with extensive pulmonary disease 
history possibly secondary to COPD exacerbation: Patient has an extensive long 
history including lung cancer and interstitial lung disease and presented with 
acutely worsening dyspnea. CT imaging is nonspecific and suggests multiple 
possible etiologies.  EKG and troponins 3 have been negative.  Ultrasound right
lower extremity was negative. Pulmonology evaluated and thought this was likely 
related to COPD.  Her breathing  since improved with steroid and azithromycin 
treatment.  She received her lung biopsy on 6/12/2018 and tolerated the 
procedure well.  She is now back on her baseline oxygen requirements though she 
is desaturating with ambulation.  Because of this, she will be going to rehab.  
She will follow-up with pulmonology for the results of her biopsy.
 
#Anxiety: Patient has significant anxiety related to her underlying pulmonary 
disease.  She has required multiple additional doses of benzodiazepines.  We 
gave her a referral to psychiatry.
 
#Hyperkalemia: Patient had hyperkalemia that was mild without peaked T waves.  
It resolved after Kayexalate.
 
#Acute blood loss anemia: Patient has a chronic anemia secondary to her chronic 
kidney disease for which he receives Epogen.  She also has history of GI bleed. 
She is not complaining of any hematochezia or melena recently.  Her hemoglobin 
dropped to 6.9 and she was transfused 1 unit of packed red blood cell on 6/11/
2018.  Hemoglobin has since been stable.
 
#Chronic medical problems: Her other home medications have been continued.
Allergies:
Coded Allergies:
No Known Allergies (05/25/16)
 
 
Disposition Summary
 
Disposition
Principal Diagnosis:
1.  Acute on chronic hypoxic respiratory failure with extensive pulmonary 
disease history possibly secondary to COPD exacerbation
 
Additional Diagnosis:
2.  Chest pain syndrome
3.  Hyperkalemia
4.  Acute blood loss anemia
5.  Anxiety
 
Discharge Disposition: home or self care
 
Discharge Instructions
 
General Discharge Information
Code Status: Do Not Resucitate
Patient's Diet:
Heart healthy diet
Patient's Activity:
As tolerated
Follow-Up Instructions/Appts:
Please take all medications as directed.  Please follow-up with primary care, 
pulmonology, and psychiatry.
 
Medications at Discharge
Discharge Medications:
Continue taking these medications:
Omeprazole (Omeprazole) 40 MG CAPSULE.DR
    1 Tablet ORAL DAILY
    Comments:
       Last Taken:6/14/18
             Time:6AM
 
Rosuvastatin Calcium (Crestor) 10 MG TABLET
    1 Tablet ORAL DAILY
    Comments:
       LIPITOR ADMINISTERED IN PLACE  
       Last Taken:6/13/18
             Time:5PM
 
Aspirin (Children's Aspirin) 81 MG TAB.CHEW
    1 Tablet ORAL DAILY
    Comments:
       Last Taken: 6/14/18
             Time: 8:54A.M
 
Alprazolam (Alprazolam) 0.5 MG TABLET
    1 Tablet ORAL TWICE DAILY
    Comments:
       Last Taken: 6/14/18
             Time: 9:03A.M
 
diphenhydrAMINE HCl (Benadryl) 25 MG CAPSULE
    1 Capsule ORAL TAKE AT BEDTIME as needed for SLEEP
    Comments:
       Last Taken: NOT GIVEN THIS THIS ADMISSION
             Time: 10 am
 
Metoprolol Succ XL (Toprol Xl) 50 MG TAB.ER.24H
    1 Tablet ORAL DAILY
    Comments:
       Last Taken: 6/14/18
             Time: 9AM
 
Albuterol Sulfate (Proair Respiclick) 90 MCG AER.POW.BA
    2 PUFF Inhale through mouth EVERY 8 HOURS AS NEEDED as needed for COPD
    Comments:
       Not given in hospital
 
Amiodarone HCl (Pacerone) 100 MG TABLET
    100 Milligram ORAL Every other day
    Comments:
       Last Taken: 6/12/18
             Time: 4:46P.M
 
Fluticasone/Vilanterol (Breo Ellipta 100-25 Mcg INH) 100 MCG-25 MCG/DOSE 
BLST.W.DEV
    1 PUFF Inhale through mouth DAILY
    Comments:
       NOT GIVEN
 
Umeclidinium Bromide (Incruse Ellipta) 62.5 MCG/ACTUATION BLST.W.DEV
    1 PUFF Inhale through mouth DAILY
    Comments:
       Not given in hospital
 
Ezetimibe (Zetia) 10 MG TABLET
    1 Tablet ORAL DAILY
    Comments:
       Last Taken: 6/14/18
             Time: 08:54A.M
 
Epoetin Hermelindo (Epogen) (Unknown Strength) VIAL
    Unknown Dose Inject into fatty tissue [id number]
    Instructions:
       Q 3 WEEKS, LAST TAKEN MONDAY 6/4/18)
 
Furosemide (Furosemide) 20 MG TABLET
    20 Milligram ORAL Every other day
    Comments:
       Last Taken:6/12/18
             Time:4PM
 
Levothyroxine Sodium (Levothyroxine Sodium) 50 MCG TABLET
    1 Tablet ORAL DAILY
    Comments:
       Last Taken:6/14/18
             Time:6AM
 
Ranolazine (Ranexa) 500 MG TAB.ER.12H
    1,000 Milligram ORAL TWICE DAILY
 
Cholecalciferol (Vitamin D3) (Vitamin D3) 1,000 UNIT CAPSULE
    1 Capsule ORAL DAILY
    Comments:
       Last Taken:6/14/18
             Time:8:54A.M
 
Sennosides (Senna) 8.6 MG TABLET
    8.6 Milligram ORAL AS NEEDED
    Comments:
       Last Taken:6/13/18
             Time:10PM
 
Mirtazapine (Mirtazapine) 15 MG TABLET
    1 Tablet ORAL Every night as needed for SLEEP
    Comments:
       NOT TAKEN
 
Start taking the following new medications:
Prednisone (Prednisone) 10 MG TABLET
    1 Tablet ORAL DAILY
    Qty = 20
    No Refills
    Instructions:
       Please take 40mg from 6/15/18-6/16/18, 30mg from 6/17/18-6/18/18, 20mg
       from 6/19/18-6/20/18, 10mg from 6/21/18-6/22/18, then stop
    Comments:
       Last Taken:6/14/18
             Time:9AM
 
 
Copies To:
Deb NEWBERRY,Diogo SALDIVAR; Nilo NEWBERRY,Raghav; Cornelius Burrell

## 2018-06-13 NOTE — PN- CARDIOLOGY
Subjective
Subjective:
 
Patient is resting comfortably status post lung biopsy.  She has not had a bowel
movement yet.
 
Objective
Vital Signs and I&Os
Vital Signs
 
 
Date Time Temp Pulse Resp B/P B/P Pulse O2 O2 Flow FiO2
 
     Mean Ox Delivery Rate 
 
06/13 0828  85  108/50     
 
06/13 0828  85  108/50     
 
06/13 0824      95 Nasal 4.0L 
 
       Cannula  
 
06/13 0647 97.9 85 20 108/50  99 Nasal  
 
       Cannula  
 
06/12 2127 97.1 95 20 108/92  93 Nasal  
 
       Cannula  
 
06/12 2037      95 Nasal 4.0L 
 
       Cannula  
 
06/12 2019  95  108/52     
 
06/12 1835      95 Nasal 4.0L 
 
       Cannula  
 
06/12 1646  90  116/52     
 
06/12 1600      95 Nasal 5.0L 
 
       Cannula  
 
06/12 1423 97.7 91 20 116/52  95 Nasal  
 
       Cannula  
 
 
 Intake & Output
 
 
 06/13 1600 06/13 0800 06/13 0000 06/12 1600 06/12 0800 06/12 0000
 
Intake Total  110.5 362.5 480 50 250
 
Output Total   750   250
 
Balance  110.5 -387.5 480 50 0
 
       
 
Intake, IV  10.5 22.5   150
 
Intake, Oral  100 340 480 50 100
 
Number      0
 
Bowel      
 
Movements      
 
Output, Urine   750   250
 
Patient   130 lb   125 lb
 
Weight      
 
 
 
Physical Exam:
 
General: no apparent distress. Alert.  On nasal cannula
Eyes: No obvious scleral icterus.
HEENT: No jugular venous distention or abnormal jugular venous pulsations.
Cardiovascular: Normal intensity S1/S2.  Regular
Respiratory: Decreased air entry bilaterally
Abdomen: Soft, nontender with no guarding or rebound tenderness.
Musculoskeletal: No clubbing or cyanosis noted; no edema
Skin: warm
Neurologic: No gross focal deficits noted.
Current Medications:
 Current Medications
 
 
  Sig/Enrique Start time  Last
 
Medication Dose Route Stop Time Status Admin
 
Acetaminophen 650 MG .STK-MED ONE 06/12 1723 DC 
 
  PO 06/12 1724  
 
Acetaminophen 650 MG Q4P PRN 06/11 0830 AC 06/12
 
  PO   1725
 
Albuterol Sulfate 3 ML BID 06/11 0900 AC 06/13
 
  INH   0824
 
Albuterol Sulfate 2 PUF Q4 06/10 1800 AC 06/13
 
  INH   0829
 
Alprazolam 0.5 MG BID PRN 06/13 0700 AC 
 
  PO 06/20 0659  
 
Alprazolam 0.5 MG BID 06/12 2100 AC 06/13
 
  PO 06/19 1359  0828
 
Alprazolam 0.5 MG ONCE ONE 06/12 1400 DC 06/12
 
  PO 06/12 1401  1400
 
Alprazolam 0.5 MG BID 06/10 2100 DC 06/12
 
  PO 06/17 2059  0903
 
Amiodarone HCl 100 MG Q48H 06/10 1745 AC 06/12
 
  PO   1646
 
Atorvastatin Calcium 40 MG 1700 06/11 1700 AC 06/12
 
  PO   1646
 
Azithromycin 250 MG DAILY 06/12 0900 AC 06/13
 
  PO 06/15 0901  0828
 
Budesonide/ 2 PUF BID 06/10 2100 AC 06/13
 
Formoterol Fumarate  INH   0829
 
Cholecalciferol 3,000 IU DAILY 06/11 0900 AC 06/13
 
  PO   0828
 
Ezetimibe 10 MG DAILY 06/11 0900 AC 06/13
 
  PO   0828
 
Fentanyl Citrate 0 .STK-MED ONE 06/12 1056 DC 
 
  .ROUTE   
 
Flumazenil 0 .STK-MED ONE 06/12 1056 DC 
 
  IV   
 
Furosemide 20 MG Q48H 06/10 1745 AC 06/12
 
  PO   1646
 
Hydroxyzine HCl 50 MG AT BEDTIME AS NEED.. 06/11 0945 AC 
 
  PO   
 
Levothyroxine Sodium 0.05 MG DAILY AC 06/11 0700 AC 06/13
 
  PO   0543
 
Lidocaine 1 ML .STK-MED ONE 06/12 1214 DC 
 
  ID 06/12 1215  
 
Lorazepam 1 MG ONCE ONE 06/12 2130 DC 06/12
 
  IV 06/12 2131 2135
 
Methylprednisolone 40 MG Q12 06/11 1230 DC 06/12
 
  IV 06/12 2300  2019
 
Metoprolol Succinate 50 MG DAILY 06/11 0900 AC 06/13
 
  PO   0828
 
Midazolam HCl 0 .STK-MED ONE 06/12 1056 DC 
 
  .ROUTE   
 
Mirtazapine 15 MG QPM PRN 06/10 1745 AC 
 
  PO   
 
Naloxone HCl 0 .STK-MED ONE 06/12 1056 DC 
 
  .ROUTE   
 
Omeprazole 40 MG DAILY AC 06/11 0700 AC 06/13
 
  PO   0543
 
Patient Medication  1 ED ONE ONE 06/12 1630 DC 
 
Teaching  ED 06/12 1631  
 
Prednisone 50 MG DAILY 06/13 0900 AC 06/13
 
  PO 06/28 0859  0829
 
Ramelteon 8 MG AT BEDTIME 06/11 2100 AC 06/12
 
  PO   2135
 
Ranolazine 1,000 MG BID 06/10 2100 AC 06/13
 
  PO   0828
 
Senna/Docusate Sodium 1 TAB DAILY PRN 06/10 1745  06/12
 
  PO   2016
 
Tiotropium Bromide 1 PUF DAILY 06/10 1739  06/13
 
  INH   0828
 
 
 
 
Results
Last 48 Hrs of Labs/Mics:
 Laboratory Tests
 
06/13/18 0620:
CBC w Diff NO MAN DIFF REQ, RBC 2.99  L, MCV 82.3, MCH 26.2  L, MCHC 31.8  L, 
RDW 18.6  H, MPV 9.0, Gran % 91.9  H, Lymphocytes % 4.9  L, Monocytes % 3.0, 
Eosinophils % 0.2, Basophils % 0, Absolute Granulocytes 11.0  H, Absolute 
Lymphocytes 0.6  L, Absolute Monocytes 0.4, Absolute Eosinophils 0, Absolute 
Basophils 0
 
06/12/18 0639:
Anion Gap 13, Estimated GFR 22  L, BUN/Creatinine Ratio 18.2, CBC w Diff NO MAN 
DIFF REQ, RBC 3.11  L, MCV 81.9, MCH 26.4  L, MCHC 32.2  L, RDW 17.8  H, MPV 8.9
, Gran % 93.6  H, Lymphocytes % 5.9  L, Monocytes % 0.4  L, Eosinophils % 0.1, 
Basophils % 0, Absolute Granulocytes 9.0  H, Absolute Lymphocytes 0.6  L, 
Absolute Monocytes 0  L, Absolute Eosinophils 0, Absolute Basophils 0
 
06/11/18 1600:
CBC w Diff NO MAN DIFF REQ, RBC 3.15  L, MCV 81.3, MCH 26.1  L, MCHC 32.2  L, 
RDW 18.0  H, MPV 8.8, Gran % 71.1, Lymphocytes % 16.9  L, Monocytes % 8.9, 
Eosinophils % 3.1, Basophils % 0, Absolute Granulocytes 5.6, Absolute 
Lymphocytes 1.3, Absolute Monocytes 0.7  H, Absolute Eosinophils 0.2, Absolute 
Basophils 0
 
Recent Imaging Studies:
 
cxr:
No evidence of complications status post left lung biopsy.
Chronic lung changes.
 
echo:
 Normal left and right ventricular systolic function. No significant  
 valvular abnormalities noted. 
 
Assessment/Plan
Assessment/Plan
 
1. Shortness of breath/weakness, likely multifactorial improved
2. CAD with prior PCI (2001/2014) and prior STEMI; tx with BEN to the RCA 4/2016
3. Hx COPD/ILD/lung cancer s/p gamma knife radiation
4. hx PAF not on AC due to hx of GI bleed and anemia, In SR on low dose 
Amiodarone
5. CKD
6. Hyperkalemia, improved
7. Acute on chronic anemia
8. History of angina on Ranexa
 
 
Patient remains stable status post lung biopsy.  Echocardiogram as above with 
normal ventricular function.  Follow-up stool guaiac.  Resume aspirin when 
cleared.  Continue antianginal therapy.  No evidence of sustained arrhythmia on 
telemetry.
 
 
Benjamin Pacheco MD [facility]
Continue telemetry? No

## 2018-06-13 NOTE — PN- PULMONARY
Subjective
HPI/Critical Care Issues:
pt seen and examined
s/p lung bx without complications
down to 4LNC
desaturates with ambulation
 
Objective
Current Medications:
 Current Medications
 
 
  Sig/Enrique Start time  Last
 
Medication Dose Route Stop Time Status Admin
 
Acetaminophen 650 MG .STK-MED ONE 06/12 1723 DC 
 
  PO 06/12 1724  
 
Acetaminophen 650 MG Q4P PRN 06/11 0830 AC 06/12
 
  PO   1725
 
Albuterol Sulfate 3 ML BID 06/11 0900 AC 06/13
 
  INH   0824
 
Albuterol Sulfate 2 PUF Q4 06/10 1800 AC 06/13
 
  INH   0829
 
Alprazolam 0.5 MG BID PRN 06/13 0700 AC 
 
  PO 06/20 0659  
 
Alprazolam 0.5 MG BID 06/12 2100 AC 06/13
 
  PO 06/19 1359  0828
 
Alprazolam 0.5 MG ONCE ONE 06/12 1400 DC 06/12
 
  PO 06/12 1401  1400
 
Alprazolam 0.5 MG BID 06/10 2100 DC 06/12
 
  PO 06/17 2059  0903
 
Amiodarone HCl 100 MG Q48H 06/10 1745 AC 06/12
 
  PO   1646
 
Aspirin Buffered 81 MG DAILY 06/13 1115 AC 
 
  PO   
 
Atorvastatin Calcium 40 MG 1700 06/11 1700 AC 06/12
 
  PO   1646
 
Azithromycin 250 MG DAILY 06/12 0900 AC 06/13
 
  PO 06/15 0901  0828
 
Bisacodyl 5 MG ONE ONE 06/13 1130 UNVr 
 
  PO 06/13 1131  
 
Budesonide/ 2 PUF BID 06/10 2100 AC 06/13
 
Formoterol Fumarate  INH   0829
 
Cholecalciferol 3,000 IU DAILY 06/11 0900 AC 06/13
 
  PO   0828
 
Ezetimibe 10 MG DAILY 06/11 0900 AC 06/13
 
  PO   0828
 
Furosemide 20 MG Q48H 06/10 1745 AC 06/12
 
  PO   1646
 
Hydroxyzine HCl 50 MG AT BEDTIME AS NEED.. 06/11 0945 AC 
 
  PO   
 
Levothyroxine Sodium 0.05 MG DAILY AC 06/11 0700 AC 06/13
 
  PO   0543
 
Lidocaine 1 ML .STK-MED ONE 06/12 1214 DC 
 
  ID 06/12 1215  
 
Lorazepam 1 MG ONCE ONE 06/12 2130 DC 06/12
 
  IV 06/12 2131  2135
 
Methylprednisolone 40 MG Q12 06/11 1230 DC 06/12
 
  IV 06/12 2300  2019
 
Metoprolol Succinate 50 MG DAILY 06/11 0900 AC 06/13
 
  PO   0828
 
Mirtazapine 15 MG QPM PRN 06/10 1745 AC 
 
  PO   
 
Omeprazole 40 MG DAILY AC 06/11 0700 AC 06/13
 
  PO   0543
 
Patient Medication  1 ED ONE ONE 06/12 1630 DC 
 
Teaching  ED 06/12 1631  
 
Prednisone 50 MG DAILY 06/13 0900 AC 06/13
 
  PO 06/28 0859  0829
 
Ramelteon 8 MG AT BEDTIME 06/11 2100 AC 06/12
 
  PO   2135
 
Ranolazine 1,000 MG BID 06/10 2100 AC 06/13
 
  PO   0828
 
Senna/Docusate Sodium 1 TAB DAILY PRN 06/10 1745 AC 06/12
 
  PO   2016
 
Tiotropium Bromide 1 PUF DAILY 06/10 1739 AC 06/13
 
  INH   0828
 
 
 
 
Vital Signs & I&O
Last 24 Hrs of Vitals and I&O:
 Vital Signs
 
 
Date Time Temp Pulse Resp B/P B/P Pulse O2 O2 Flow FiO2
 
     Mean Ox Delivery Rate 
 
06/13 0828  85  108/50     
 
06/13 0828  85  108/50     
 
06/13 0824      95 Nasal 4.0L 
 
       Cannula  
 
06/13 0800      96 Nasal 4.0L 
 
       Cannula  
 
06/13 0647 97.9 85 20 108/50  99 Nasal  
 
       Cannula  
 
06/12 2127 97.1 95 20 108/92  93 Nasal  
 
       Cannula  
 
06/12 2037      95 Nasal 4.0L 
 
       Cannula  
 
06/12 2019  95  108/52     
 
06/12 1835      95 Nasal 4.0L 
 
       Cannula  
 
06/12 1646  90  116/52     
 
06/12 1600      95 Nasal 5.0L 
 
       Cannula  
 
06/12 1423 97.7 91 20 116/52  95 Nasal  
 
       Cannula  
 
 
 Intake & Output
 
 
 06/13 1600 06/13 0800 06/13 0000
 
Intake Total  110.5 362.5
 
Output Total   750
 
Balance  110.5 -387.5
 
    
 
Intake, IV  10.5 22.5
 
Intake, Oral  100 340
 
Output, Urine   750
 
Patient   130 lb
 
Weight   
 
 
 
 
Exam
Other Physical Findings:
gen-aaox3
head/neck-nasal cannula
cvs-s1,s2
lungs-dimnished bs bilaterally
abd-soft,bs+
ext-without edema
 
Results
Last 24 Hrs of Lab Results:
 Laboratory Tests
 
06/13/18 0620:
CBC w Diff NO MAN DIFF REQ, RBC 2.99  L, MCV 82.3, MCH 26.2  L, MCHC 31.8  L, 
RDW 18.6  H, MPV 9.0, Gran % 91.9  H, Lymphocytes % 4.9  L, Monocytes % 3.0, 
Eosinophils % 0.2, Basophils % 0, Absolute Granulocytes 11.0  H, Absolute 
Lymphocytes 0.6  L, Absolute Monocytes 0.4, Absolute Eosinophils 0, Absolute 
Basophils 0
 
 
Impression/Plan
 
Impression/Plan
Impression/Plan:
Impression
68 year old woman
 
* acute on chronic hypoxemic respiratory failure
* lingular lung nodule increased in size
* anemia of chronic disease
* severe COPD with likely underlying exacerbation
 
Plan
-prednisone po taper - would taper by 10mg every 2 days
-evaluate o2 needs with exertion
-plan for dc ~24 hrs
-case management to evaluate STR options, pt reluctant but open minded regarding
rehab
-reduce fio2 to a goal of >90%
-monitor cbc, pt also gets epogen
-trc/nebs
 
DVT prophylaxis at all times
 
pt is DNR
it would be appropriate to re-evaluate goals of care and code status and 
recommend DNI

## 2018-06-13 NOTE — PN- HOUSESTAFF
**See Addendum**
Subjective
Follow-up For:
COPD status post lung biopsy, blood loss anemia
Tele-Events Since Last Visit:
Sinus rhythm, 8090
Subjective:
No overnight events.  Patient in the lung biopsy yesterday and it went well.  
She slept well overnight and is feeling good this morning.  She has no pain from
the biopsy.  Her breathing is back to baseline.  She would like to walk to see 
how she does but otherwise feels like she can probably go home today.
 
Review of Systems
Constitutional:
Reports: no symptoms. 
EENTM:
Reports: no symptoms. 
Cardiovascular:
Reports: no symptoms. 
Respiratory:
Reports: see HPI. 
Gastrointestinal:
Reports: no symptoms. 
Genitourinary:
Reports: no symptoms. 
Musculoskeletal:
Reports: no symptoms. 
Skin:
Reports: no symptoms. 
Neurological/Psychological:
Reports: no symptoms. 
Hematologic/Endocrine:
Reports: no symptoms. 
Immunologic/Allergic:
Reports: no symptoms. 
 
Objective
Last 24 Hrs of Vital Signs/I&O
 Vital Signs
 
 
Date Time Temp Pulse Resp B/P B/P Pulse O2 O2 Flow FiO2
 
     Mean Ox Delivery Rate 
 
 0647 97.9 85 20 108/50  99 Nasal  
 
       Cannula  
 
 2127 97.1 95 20 108/92  93 Nasal  
 
       Cannula  
 
 2037      95 Nasal 4.0L 
 
       Cannula  
 
 2019  95  108/52     
 
 1835      95 Nasal 4.0L 
 
       Cannula  
 
 1646  90  116/52     
 
 1600      95 Nasal 5.0L 
 
       Cannula  
 
 1423 97.7 91 20 116/52  95 Nasal  
 
       Cannula  
 
 0906 98.1 97 20 156/62     
 
 0905 98.1 88 20 156/62     
 
 0825      96 Nasal 5.0L 
 
       Cannula  
 
 0800      98 Nasal 55% 
 
       Cannula  
 
 0800      99 Nasal 55% 
 
       Cannula  
 
 0714 98.1 88 20 156/62  99 Nasal  
 
       Cannula  
 
 
 Intake & Output
 
 
  0800  0000  1600
 
Intake Total 110.5 362.5 480
 
Output Total  750 
 
Balance 110.5 -387.5 480
 
    
 
Intake, IV 10.5 22.5 
 
Intake, Oral 100 340 480
 
Output, Urine  750 
 
Patient  58.967 kg 
 
Weight   
 
 
 
 
Physical Exam
General Appearance: Alert, Oriented X3, Cooperative, No Acute Distress
Cardiovascular: Regular Rate, Normal S1, Normal S2
Lungs: CRACKLES
Extremities: No Edema, Normal Pulses, No Tenderness/Swelling
Current Medications:
 Current Medications
 
 
  Sig/Enrique Start time  Last
 
Medication Dose Route Stop Time Status Admin
 
Acetaminophen 650 MG .STK-MED ONE  1723 DC 
 
  PO  1724  
 
Acetaminophen 650 MG Q4P PRN  0830 AC 
 
  PO   1725
 
Albuterol Sulfate 3 ML BID  0900 AC 
 
  INH   1835
 
Albuterol Sulfate 2 PUF Q4 06/10 1800 AC 
 
  INH   0544
 
Alprazolam 0.5 MG BID PRN  0700 AC 
 
  PO  0659  
 
Alprazolam 0.5 MG BID  2100 AC 
 
  PO  1359  2019
 
Alprazolam 0.5 MG ONCE ONE  1400 DC 
 
  PO  1401  1400
 
Alprazolam 0.5 MG BID 06/10 2100 DC 
 
  PO  2059  0903
 
Amiodarone HCl 100 MG Q48H 06/10 1745 AC 
 
  PO   1646
 
Atorvastatin Calcium 40 MG 1700  1700 AC 
 
  PO   1646
 
Azithromycin 250 MG DAILY  0900 AC 
 
  PO 06/15 0901  0905
 
Budesonide/ 2 PUF BID 06/10 2100 AC 
 
Formoterol Fumarate  INH   2020
 
Cholecalciferol 3,000 IU DAILY  0900 AC 
 
  PO   0905
 
Ezetimibe 10 MG DAILY  0900 AC 
 
  PO   0905
 
Fentanyl Citrate 0 .STK-MED ONE  1056 DC 
 
  .ROUTE   
 
Flumazenil 0 .STK-MED ONE  1056 DC 
 
  IV   
 
Furosemide 20 MG Q48H 06/10 1745 AC 
 
  PO   1646
 
Hydroxyzine HCl 50 MG AT BEDTIME AS NEED..  0945 AC 
 
  PO   
 
Levothyroxine Sodium 0.05 MG DAILY AC  0700 AC 
 
  PO   0543
 
Lidocaine 1 ML .STK-MED ONE  1214 DC 
 
  ID  1215  
 
Lorazepam 1 MG ONCE ONE  2130 DC 
 
  IV  213  213
 
Methylprednisolone 40 MG Q12  1230 DC 
 
  IV  2300  2019
 
Metoprolol Succinate 50 MG DAILY  0900 AC 
 
  PO   0906
 
Midazolam HCl 0 .STK-MED ONE  1056 DC 
 
  .ROUTE   
 
Mirtazapine 15 MG QPM PRN 06/10 1745 AC 
 
  PO   
 
Naloxone HCl 0 .STK-MED ONE  1056 DC 
 
  .ROUTE   
 
Omeprazole 40 MG DAILY AC  0700 AC 
 
  PO   0543
 
Patient Medication  1 ED ONE ONE  1630 DC 
 
Teaching  ED  1631  
 
Prednisone 50 MG DAILY  0900 AC 
 
  PO  0859  
 
Ramelteon 8 MG AT BEDTIME  2100 AC 
 
  PO   213
 
Ranolazine 1,000 MG BID 06/10 2100 AC 
 
  PO   2019
 
Senna/Docusate Sodium 1 TAB DAILY PRN 06/10 1745 AC 
 
  PO   2016
 
Tiotropium Bromide 1 PUF DAILY 06/10 1739 AC 
 
  INH   0901
 
 
 
 
Last 24 Hrs of Lab/Stephon Results
Last 24 Hrs of Labs/Mics:
 Laboratory Tests
 
18 0620:
CBC w Diff Pending, WBC Pending, RBC Pending, Hgb Pending, Hct Pending, MCV 
Pending, MCH Pending, MCHC Pending, RDW Pending, Plt Count Pending, MPV Pending
 
 
Assessment/Plan
Assessment:
Ms. Landers is a 68-year-old female with past medical history of coronary 
artery disease status post MI with multiple stents followed by Dr. Castrejon, 
hypertension, hyperlipidemia, squamous cell lung cancer status post gamma knife 
followed by Dr. Corcoran, interstitial lung disease on 3 L oxygen, chronic kidney 
disease followed by Dr. Chacon, paroxysmal atrial fibrillation not on 
anticoagulation due to a GI bleed and anxiety who presented with shortness of 
breath.  
 
Problem list:
1.  Acute on chronic hypoxic respiratory failure with extensive pulmonary 
disease history possibly secondary to COPD exacerbation
2.  Chest pain syndrome
3.  Hyperkalemia
4.  Acute blood loss anemia
5.  Anxiety
 
#Acute on chronic hypoxic respiratory failure with extensive pulmonary disease 
history possibly secondary to COPD exacerbation: Patient has an extensive long 
history including lung cancer and interstitial lung disease and presented with 
acutely worsening dyspnea. CT imaging is nonspecific and suggests multiple 
possible etiologies.  EKG and troponins 3 have been negative.  Ultrasound right
lower extremity negative. Pulmonology evaluated and think this was likely 
related to COPD.  Her breathing is since improved with steroid and azithromycin 
treatment.  She received her lung biopsy yesterday and tolerated the procedure 
well.  She is now back on her baseline oxygen requirements.  After walking, she 
feels like she can probably go home today.
-Appreciate cardiology and pulmonology recommendations
-Continue ranolazine
-Continue steroid taper
-Continue azithromycin
 
#Anxiety: Patient has significant anxiety related to her underlying pulmonary 
disease.  She has required multiple additional doses of benzodiazepines.
-Alprazolam twice a day and as needed
-Consider outpatient psychiatry referral
 
#Hyperkalemia: Patient had hyperkalemia that was mild without peaked T waves.  
It has resolved after Kayexalate.
-Continue to monitor
 
#Acute blood loss anemia: Patient has a chronic anemia secondary to her chronic 
kidney disease for which he receives Epogen.  She also has history of GI bleed. 
She is not complaining of any hematochezia or melena recently.  Her hemoglobin 
dropped to 6.9 and she was transfused 1 unit of packed red blood cell on 2018.  Hemoglobin has since been stable.
-Guaiac all stools
-Continue Epogen
-Continue to monitor, hemoglobin goal greater than 8
 
#Chronic medical problems:
-Continue other home medications
 
DVT prophylaxis with heparin
CHF diet
DNR/okay to intubate
Problem List:
 1. COPD (chronic obstructive pulmonary disease)
 
Pain Ratin
Pain Location:
no
Pain Goal: Remain pain free
Pain Plan:
see a/p
Tomorrow's Labs & Rationales:
no

## 2018-06-13 NOTE — PATIENT DISCHARGE INSTRUCTIONS
Discharge Instructions
 
General Discharge Information
You were seen/treated for:
COPD exacerbation, lung cancer status post biopsy
Watch for these problems:
Fever, chest pain, shortness of breath
Special Instructions:
Please take all medications as directed.  Please follow-up with primary care and
pulmonology.
 
Diet
Continue normal diet: Yes
 
Activity
Full Activity/No Limits: Yes
 
Acute Coronary Syndrome
 
Inclusion Criteria
At DC or during hospital stay patient has or had the following:
ACS DIAGNOSIS No
 
Discharge Core Measures
Meds if any: Prescribed or Continued at Discharge
Meds if any: NOT Prescribed or Continued at Discharge
 
Congestive Heart Failure
 
Inclusion Criteria
At DC or during hospital stay patient has or had the following:
CHF DIAGNOSIS No
 
Discharge Core Measures
Meds if any: Prescribed or Continued at Discharge
Meds if any: NOT Prescribed or Continued at Discharge
 
Cerebrovascular accident
 
Inclusion Criteria
At DC or during hospital stay patient has or had the following:
CVA/TIA Diagnosis No
 
Discharge Core Measures
Meds if any: Prescribed or Continued at Discharge
Meds if any: NOT Prescribed or Continued at Discharge
 
Venous thromboembolism
 
Inclusion Criteria
VTE Diagnosis No
VTE Type NONE
VTE Confirmed by (Test) NONE
 
Discharge Core Measures
- Per Current guidelines, there needs to be overlap
- treatment for the first 5 days of Warfarin therapy.
- If discharged on Warfarin prior to 5 days of
- overlap therapy, the patient will need to be
- assessed for post discharge needs including
- *Post discharge parental anticoagulation
- *Warfarin and/or parental anticoagulation education
- *Follow up date to check INR post discharge
At least 5 days overlap therapy as Inpatient No
Meds if any: Prescribed or Continued at Discharge
Note: Overlap Therapy is Warfarin and Anticoagulant
Meds if any: NOT Prescribed or Continued at Discharge

## 2018-06-14 VITALS — SYSTOLIC BLOOD PRESSURE: 130 MMHG | DIASTOLIC BLOOD PRESSURE: 54 MMHG

## 2018-06-14 VITALS — DIASTOLIC BLOOD PRESSURE: 54 MMHG | SYSTOLIC BLOOD PRESSURE: 130 MMHG

## 2018-06-14 LAB
ABSOLUTE GRANULOCYTE CT: 10 /CUMM (ref 1.4–6.5)
BASOPHILS # BLD: 0 /CUMM (ref 0–0.2)
BASOPHILS NFR BLD: 0 % (ref 0–2)
EOSINOPHIL # BLD: 0 /CUMM (ref 0–0.7)
EOSINOPHIL NFR BLD: 0.2 % (ref 0–5)
ERYTHROCYTE [DISTWIDTH] IN BLOOD BY AUTOMATED COUNT: 19.3 % (ref 11.5–14.5)
GRANULOCYTES NFR BLD: 86.4 % (ref 42.2–75.2)
HCT VFR BLD CALC: 25.2 % (ref 37–47)
LYMPHOCYTES # BLD: 0.9 /CUMM (ref 1.2–3.4)
MCH RBC QN AUTO: 26.2 PG (ref 27–31)
MCHC RBC AUTO-ENTMCNC: 31.7 G/DL (ref 33–37)
MCV RBC AUTO: 82.7 FL (ref 81–99)
MONOCYTES # BLD: 0.7 /CUMM (ref 0.1–0.6)
PLATELET # BLD: 328 /CUMM (ref 130–400)
PMV BLD AUTO: 9.1 FL (ref 7.4–10.4)
RED BLOOD CELL CT: 3.05 /CUMM (ref 4.2–5.4)
WBC # BLD AUTO: 11.5 /CUMM (ref 4.8–10.8)

## 2018-06-14 NOTE — PN- HOUSESTAFF
**See Addendum**
Subjective
Follow-up For:
COPD exacerbation
Tele-Events Since Last Visit:
Not on telemetry
Subjective:
No overnight events.  Patient slept well and has no chest pain or shortness of 
breath.  She has not had a bowel movement yet.  No other complaints.
 
Review of Systems
Constitutional:
Reports: no symptoms. 
EENTM:
Reports: no symptoms. 
Cardiovascular:
Reports: no symptoms. 
Respiratory:
Reports: no symptoms. 
Gastrointestinal:
Reports: see HPI. 
Genitourinary:
Reports: no symptoms. 
Musculoskeletal:
Reports: no symptoms. 
Skin:
Reports: no symptoms. 
Neurological/Psychological:
Reports: no symptoms. 
Hematologic/Endocrine:
Reports: no symptoms. 
Immunologic/Allergic:
Reports: no symptoms. 
 
Objective
Last 24 Hrs of Vital Signs/I&O
 Vital Signs
 
 
Date Time Temp Pulse Resp B/P B/P Pulse O2 O2 Flow FiO2
 
     Mean Ox Delivery Rate 
 
 0631 97.7 69 20 130/54  95 Nasal  
 
       Cannula  
 
 2146 98.0 82 20 140/68  96 Nasal  
 
       Cannula  
 
 2120       Nasal 4.0L 
 
       Cannula  
 
2011    140/68     
 
 1442 98.1 85 20 112/60  96 Nasal 3.0L 
 
       Cannula  
 
 0828  85  108/50     
 
 0828  85  108/50     
 
 0824      95 Nasal 4.0L 
 
       Cannula  
 
 0800      96 Nasal 4.0L 
 
       Cannula  
 
 
 Intake & Output
 
 
  0800  0000  1600
 
Intake Total 100 100 410
 
Output Total   
 
Balance 100 100 410
 
    
 
Intake, IV   10
 
Intake, Oral 100 100 400
 
Patient  58.684 kg 
 
Weight   
 
Weight  Bed scale 
 
Measurement   
 
Method   
 
 
 
 
Physical Exam
General Appearance: Alert, Oriented X3, Cooperative, No Acute Distress
Cardiovascular: Regular Rate, Normal S1, Normal S2
Lungs: crackles
Extremities: No Edema, Normal Pulses, No Tenderness/Swelling
Current Medications:
 Current Medications
 
 
  Sig/Enrique Start time  Last
 
Medication Dose Route Stop Time Status Admin
 
Acetaminophen 650 MG Q4P PRN  0830 AC 
 
  PO   1725
 
Albuterol Sulfate 3 ML BID  0900 AC 
 
  INH   0824
 
Albuterol Sulfate 2 PUF Q4 06/10 1800 AC 
 
  INH   0541
 
Alprazolam 0.5 MG BID PRN  0700 AC 
 
  PO  0659  2202
 
Alprazolam 0.5 MG BID  2100 AC 
 
  PO  1359  
 
Amiodarone HCl 100 MG Q48H 06/10 1745 AC 
 
  PO   1646
 
Aspirin Buffered 81 MG DAILY  1115 AC 
 
  PO   1256
 
Atorvastatin Calcium 40 MG 1700  1700 AC 
 
  PO   1711
 
Azithromycin 250 MG DAILY  0900 AC 
 
  PO 06/15 0901  0828
 
Bisacodyl 10 MG ONCE ONE  0715 UNVr 
 
  NH  0716  
 
Bisacodyl 5 MG ONE ONE  1130 DC 
 
  PO  1131  1257
 
Budesonide/ 2 PUF BID 06/10 2100 AC 
 
Formoterol Fumarate  INH   
 
Cholecalciferol 3,000 IU DAILY  0900 AC 
 
  PO   0828
 
Ezetimibe 10 MG DAILY  0900 AC 
 
  PO   0828
 
Furosemide 20 MG Q48H 06/10 1745 AC 
 
  PO   1646
 
Hydroxyzine HCl 50 MG AT BEDTIME AS NEED..  0945 AC 
 
  PO   
 
Levothyroxine Sodium 0.05 MG DAILY AC  0700 AC 
 
  PO   0541
 
Metoprolol Succinate 50 MG DAILY  0900 AC 
 
  PO   0828
 
Mirtazapine 15 MG QPM PRN 06/10 1745 AC 
 
  PO   
 
Omeprazole 40 MG DAILY AC  0700 AC 
 
  PO   0541
 
Patient Medication  1 ED ONE ONE  1230 DC 
 
Teaching  ED  1231  
 
Prednisone 50 MG DAILY  09 UNVr 
 
  PO  0859  
 
Prednisone 50 MG DAILY  0900 DC 
 
  PO  0859  0829
 
Ramelteon 8 MG AT BEDTIME  2100 AC 
 
  PO   2202
 
Ranolazine 1,000 MG BID 06/10 2100 AC 
 
  PO   2011
 
Senna/Docusate Sodium 1 TAB DAILY PRN 06/10 1745 AC 
 
  PO   2203
 
Tiotropium [place] 1 PUF DAILY 06/10 1739 AC 
 
  INH   0828
 
 
 
 
Last 24 Hrs of Lab/Stephon Results
Last 24 Hrs of Labs/Mics:
 Laboratory Tests
 
18 0625:
CBC w Diff Pending, WBC Pending, RBC Pending, Hgb Pending, Hct Pending, MCV 
Pending, MCH Pending, MCHC Pending, RDW Pending, Plt Count Pending, MPV Pending
 
 
Assessment/Plan
Assessment:
Ms. Landers is a 68-year-old female with past medical history of coronary 
artery disease status post MI with multiple stents followed by Dr. Castrejon, 
hypertension, hyperlipidemia, squamous cell lung cancer status post gamma knife 
followed by Dr. Corcoran, interstitial lung disease on 3 L oxygen, chronic kidney 
disease followed by Dr. Chacon, paroxysmal atrial fibrillation not on 
anticoagulation due to a GI bleed and anxiety who presented with shortness of 
breath.  
 
Problem list:
1.  Acute on chronic hypoxic respiratory failure with extensive pulmonary 
disease history possibly secondary to COPD exacerbation
2.  Chest pain syndrome
3.  Hyperkalemia
4.  Acute blood loss anemia
5.  Anxiety
 
#Acute on chronic hypoxic respiratory failure with extensive pulmonary disease 
history possibly secondary to COPD exacerbation: Patient has an extensive long 
history including lung cancer and interstitial lung disease and presented with 
acutely worsening dyspnea. CT imaging is nonspecific and suggests multiple 
possible etiologies.  EKG and troponins 3 have been negative.  Ultrasound right
lower extremity negative. Pulmonology evaluated and think this was likely 
related to COPD.  Her breathing is since improved with steroid and azithromycin 
treatment.  She received her lung biopsy yesterday and tolerated the procedure 
well.  She is now back on her baseline oxygen requirements.  She desaturated 
while walking yesterday on the oxygen however so she stayed another day.  We 
will see if she can go home today.
-Appreciate cardiology and pulmonology recommendations
-Continue ranolazine
-Continue steroid taper
-Continue azithromycin
 
#Anxiety: Patient has significant anxiety related to her underlying pulmonary 
disease.  She has required multiple additional doses of benzodiazepines.
-Alprazolam twice a day and as needed
-Consider outpatient psychiatry referral
 
#Hyperkalemia: Patient had hyperkalemia that was mild without peaked T waves.  
It has resolved after Kayexalate.
-Continue to monitor
 
#Acute blood loss anemia: Patient has a chronic anemia secondary to her chronic 
kidney disease for which he receives Epogen.  She also has history of GI bleed. 
She is not complaining of any hematochezia or melena recently.  Her hemoglobin 
dropped to 6.9 and she was transfused 1 unit of packed red blood cell on 2018.  Hemoglobin has since been stable.
-Guaiac all stools
-Continue Epogen
-Continue to monitor, hemoglobin goal greater than 8
 
#Chronic medical problems:
-Continue other home medications
 
DVT prophylaxis with heparin
CHF diet
DNR/okay to intubate
Problem List:
 1. COPD (chronic obstructive pulmonary disease)
 
Pain Ratin
Pain Location:
no
Pain Goal: Remain pain free
Pain Plan:
see a/p
Tomorrow's Labs & Rationales:
cbc

## 2018-06-14 NOTE — PN- PULMONARY
Subjective
HPI/Critical Care Issues:
pt seen and examined
 
Objective
Current Medications:
 Current Medications
 
 
  Sig/Enrique Start time  Last
 
Medication Dose Route Stop Time Status Admin
 
Acetaminophen 650 MG Q4P PRN 06/11 0830 AC 06/12
 
  PO   1725
 
Albuterol Sulfate 3 ML BID 06/11 0900 AC 06/14
 
  INH   0847
 
Albuterol Sulfate 2 PUF Q4 06/10 1800 AC 06/14
 
  INH   0541
 
Alprazolam 0.5 MG BID PRN 06/13 0700 AC 06/13
 
  PO 06/20 0659  2202
 
Alprazolam 0.5 MG BID 06/12 2100 AC 06/14
 
  PO 06/19 1359  0900
 
Amiodarone HCl 100 MG Q48H 06/10 1745 AC 06/12
 
  PO   1646
 
Aspirin Buffered 81 MG DAILY 06/13 1115 AC 06/14
 
  PO   0854
 
Atorvastatin Calcium 40 MG 1700 06/11 1700 AC 06/13
 
  PO   1711
 
Azithromycin 250 MG DAILY 06/12 0900 AC 06/14
 
  PO 06/15 0901  0854
 
Bisacodyl 10 MG ONCE ONE 06/14 0715 DC 
 
  MO 06/14 0716  
 
Bisacodyl 5 MG ONE ONE 06/13 1130 DC 06/13
 
  PO 06/13 1131  1257
 
Budesonide/ 2 PUF BID 06/10 2100 AC 06/14
 
Formoterol Fumarate  INH   0852
 
Cholecalciferol 3,000 IU DAILY 06/11 0900 AC 06/14
 
  PO   0854
 
Ezetimibe 10 MG DAILY 06/11 0900 AC 06/14
 
  PO   0854
 
Furosemide 20 MG Q48H 06/10 1745 AC 06/12
 
  PO   1646
 
Hydroxyzine HCl 50 MG AT BEDTIME AS NEED.. 06/11 0945 AC 
 
  PO   
 
Levothyroxine Sodium 0.05 MG DAILY AC 06/11 0700 AC 06/14
 
  PO   0541
 
Metoprolol Succinate 50 MG DAILY 06/11 0900 AC 06/14
 
  PO   0853
 
Mirtazapine 15 MG QPM PRN 06/10 1745 AC 
 
  PO   
 
Omeprazole 40 MG DAILY AC 06/11 0700 AC 06/14
 
  PO   0541
 
Patient Medication  1 ED ONE ONE 06/13 1230 DC 
 
Teaching  ED 06/13 1231  
 
Prednisone 50 MG DAILY 06/14 0900 AC 06/14
 
  PO 06/23 0859  0852
 
Prednisone 50 MG DAILY 06/13 0900 DC 06/13
 
  PO 06/28 0859  0829
 
Ramelteon 8 MG AT BEDTIME 06/11 2100 AC 06/13
 
  PO   2202
 
Ranolazine 1,000 MG BID 06/10 2100 AC 06/14
 
  PO   0853
 
Senna/Docusate Sodium 1 TAB DAILY PRN 06/10 1745 AC 06/13
 
  PO   2203
 
Tiotropium [place] 1 PUF DAILY 06/10 1739 AC 06/14
 
  INH   0852
 
 
 
 
Vital Signs & I&O
Last 24 Hrs of Vitals and I&O:
 Vital Signs
 
 
Date Time Temp Pulse Resp B/P B/P Pulse O2 O2 Flow FiO2
 
     Mean Ox Delivery Rate 
 
06/14 0853 97.7 69 20 130/54     
 
06/14 0853 97.7 69 20 130/54     
 
06/14 0800      96 Nasal 4.0L 
 
       Cannula  
 
06/14 0631 97.7 69 20 130/54  95 Nasal  
 
       Cannula  
 
06/13 2146 98.0 82 20 140/68  96 Nasal  
 
       Cannula  
 
06/13 2120       Nasal 4.0L 
 
       Cannula  
 
06/13 2011    140/68     
 
06/13 1442 98.1 85 20 112/60  96 Nasal 3.0L 
 
       Cannula  
 
 
 Intake & Output
 
 
 06/14 1600 06/14 0800 06/14 0000
 
Intake Total  100 100
 
Output Total   
 
Balance  100 100
 
    
 
Intake, Oral  100 100
 
Patient   129 lb
 
Weight   
 
Weight   Bed scale
 
Measurement   
 
Method   
 
 
 
 
Exam
Other Physical Findings:
gen-aaox3
head/neck-nasal cannula
cvs-s1,s2
lungs-dimnished bs bilaterally
abd-soft,bs+
ext-without edema
 
Results
Last 24 Hrs of Lab Results:
 Laboratory Tests
 
06/14/18 0625:
CBC w Diff NO MAN DIFF REQ, RBC 3.05  L, MCV 82.7, MCH 26.2  L, MCHC 31.7  L, 
RDW 19.3  H, MPV 9.1, Gran % 86.4  H, Lymphocytes % 7.4  L, Monocytes % 6.0, 
Eosinophils % 0.2, Basophils % 0, Absolute Granulocytes 10.0  H, Absolute 
Lymphocytes 0.9  L, Absolute Monocytes 0.7  H, Absolute Eosinophils 0, Absolute 
Basophils 0
 
 
Impression/Plan
 
Impression/Plan
Impression/Plan:
Impression
68 year old woman
 
* acute on chronic hypoxemic respiratory failure
* lingular lung nodule increased in size
* anemia of chronic disease
* severe COPD with likely underlying exacerbation
 
Plan
-prednisone po taper - would taper by 10mg every 2 days
-evaluate o2 needs with exertion
-DC planning
-STR today
-reduce fio2 to a goal of >90%
-monitor cbc, pt also gets epogen
-trc/nebs
-please ensure her anxiolysis is controlled and reflected in dc med rec for 
rehab facility
 
DVT prophylaxis at all times
 
pt is DNR
it would be appropriate to re-evaluate goals of care and code status and 
recommend DNI

## 2018-06-14 NOTE — PN- CARDIOLOGY
Subjective
Subjective:
Patient is off telemetry.  She is scheduled to go to short-term rehab today.  
She would rather go home.  But no complaints of chest pain or unusual shortness 
of breath at rest.  She had a lung biopsy yesterday.
 
Objective
Vital Signs and I&Os
Vital Signs
 
 
Date Time Temp Pulse Resp B/P B/P Pulse O2 O2 Flow FiO2
 
     Mean Ox Delivery Rate 
 
06/14 0631 97.7 69 20 130/54  95 Nasal  
 
       Cannula  
 
06/13 2146 98.0 82 20 140/68  96 Nasal  
 
       Cannula  
 
06/13 2120       Nasal 4.0L 
 
       Cannula  
 
06/13 2011    140/68     
 
06/13 1442 98.1 85 20 112/60  96 Nasal 3.0L 
 
       Cannula  
 
 
 Intake & Output
 
 
 06/14 1600 06/14 0800 06/14 0000 06/13 1600 06/13 0800 06/13 0000
 
Intake Total  100 100 410 110.5 362.5
 
Output Total      750
 
Balance  100 100 410 110.5 -387.5
 
       
 
Intake, IV    10 10.5 22.5
 
Intake, Oral  100 100 400 100 340
 
Output, Urine      750
 
Patient   129 lb   130 lb
 
Weight      
 
Weight   Bed scale   
 
Measurement      
 
Method      
 
 
 
Physical Exam:
On general exam patient appeared comfortable
Head normocephalic atraumatic
Eyes sclera anicteric conjunctiva showed marked pallor extraocular muscles were 
normal
Neck no jugular venous distention no thyroid masses no palpable nodes
Chest lungs fine crackles bilaterally
Heart regular rhythm with a 1/6 systolic murmur
Abdomen soft no organomegaly bowel sounds normal
Extremities no clubbing cyanosis or edema
Neurological no gross motor or sensory deficits
Current Medications:
 Current Medications
 
 
  Sig/Enrique Start time  Last
 
Medication Dose Route Stop Time Status Admin
 
Acetaminophen 650 MG Q4P PRN 06/11 0830 AC 06/12
 
  PO   1725
 
Albuterol Sulfate 3 ML BID 06/11 0900 AC 06/14
 
  INH   0847
 
Albuterol Sulfate 2 PUF Q4 06/10 1800 AC 06/14
 
  INH   0541
 
Alprazolam 0.5 MG BID PRN 06/13 0700 AC 06/13
 
  PO 06/20 0659  2202
 
Alprazolam 0.5 MG BID 06/12 2100 AC 06/13
 
  PO 06/19 1359  2011
 
Amiodarone HCl 100 MG Q48H 06/10 1745 AC 06/12
 
  PO   1646
 
Aspirin Buffered 81 MG DAILY 06/13 1115 AC 06/13
 
  PO   1256
 
Atorvastatin Calcium 40 MG 1700 06/11 1700 AC 06/13
 
  PO   1711
 
Azithromycin 250 MG DAILY 06/12 0900 AC 06/13
 
  PO 06/15 0901  0828
 
Bisacodyl 10 MG ONCE ONE 06/14 0715 DC 
 
  MS 06/14 0716  
 
Bisacodyl 5 MG ONE ONE 06/13 1130 DC 06/13
 
  PO 06/13 1131  1257
 
Budesonide/ 2 PUF BID 06/10 2100 AC 06/13
 
Formoterol Fumarate  INH   2011
 
Cholecalciferol 3,000 IU DAILY 06/11 0900 AC 06/13
 
  PO   0828
 
Ezetimibe 10 MG DAILY 06/11 0900 AC 06/13
 
  PO   0828
 
Furosemide 20 MG Q48H 06/10 1745 AC 06/12
 
  PO   1646
 
Hydroxyzine HCl 50 MG AT BEDTIME AS NEED.. 06/11 0945 AC 
 
  PO   
 
Levothyroxine Sodium 0.05 MG DAILY AC 06/11 0700 AC 06/14
 
  PO   0541
 
Metoprolol Succinate 50 MG DAILY 06/11 0900 AC 06/13
 
  PO   0828
 
Mirtazapine 15 MG QPM PRN 06/10 1745 AC 
 
  PO   
 
Omeprazole 40 MG DAILY AC 06/11 0700 AC 06/14
 
  PO   0541
 
Patient Medication  1 ED ONE ONE 06/13 1230 DC 
 
Teaching  ED 06/13 1231  
 
Prednisone 50 MG DAILY 06/14 0900 AC 
 
  PO 06/23 0859  
 
Prednisone 50 MG DAILY 06/13 0900 DC 06/13
 
  PO 06/28 0859  0829
 
Ramelteon 8 MG AT BEDTIME 06/11 2100 AC 06/13
 
  PO   2202
 
Ranolazine 1,000 MG BID 06/10 2100 AC 06/13
 
  PO   2011
 
Senna/Docusate Sodium 1 TAB DAILY PRN 06/10 1745 AC 06/13
 
  PO   2203
 
Tiotropium [place] 1 PUF DAILY 06/10 1739 AC 06/13
 
  INH   0828
 
 
 
 
Results
Last 48 Hrs of Labs/Mics:
 Laboratory Tests
 
06/14/18 0625:
CBC w Diff Pending, WBC Pending, RBC Pending, Hgb Pending, Hct Pending, MCV 
Pending, MCH Pending, MCHC Pending, RDW Pending, Plt Count Pending, MPV Pending,
Gran % Pending, Lymphocytes % Pending, Monocytes % Pending, Eosinophils % 
Pending, Basophils % Pending, Absolute Granulocytes Pending, Absolute 
Lymphocytes Pending, Absolute Monocytes Pending, Absolute Eosinophils Pending, 
Absolute Basophils Pending
 
06/13/18 0620:
CBC w Diff NO MAN DIFF REQ, RBC 2.99  L, MCV 82.3, MCH 26.2  L, MCHC 31.8  L, 
RDW 18.6  H, MPV 9.0, Gran % 91.9  H, Lymphocytes % 4.9  L, Monocytes % 3.0, 
Eosinophils % 0.2, Basophils % 0, Absolute Granulocytes 11.0  H, Absolute 
Lymphocytes 0.6  L, Absolute Monocytes 0.4, Absolute Eosinophils 0, Absolute 
Basophils 0
 
 
Assessment/Plan
Assessment/Plan
In summary  this 68-year-old female has a following problems
1. Shortness of breath/weakness, likely multifactorial improved
2. CAD with prior PCI (2001/2014) and prior STEMI; tx with BEN to the RCA 4/2016
3. Hx COPD/ILD/lung cancer s/p gamma knife radiation
4. hx PAF not on AC due to hx of GI bleed and anemia, In SR on low dose 
Amiodarone
5. CKD
6. Hyperkalemia, improved
7. Acute on chronic anemia
8. History of angina on Ranexa
 
I suspect much of her symptoms of chest pain were related to marked anemia.  I 
suspect her shortness of breath was related to more COPD and interstitial lung 
disease.  CT scan of the chest suggests combination of COPD and interstitial 
lung disease with may be some worsening of interstitial congestion.  Follow-up 
CT scan or pulmonary function test might be suggested as an outpatient.  For the
time being continue present treatment.  Main issues appear related to chronic 
anemia lung cancer COPD interstitial lung disease.
Continue telemetry? Not applicable

## 2018-07-08 ENCOUNTER — HOSPITAL ENCOUNTER (INPATIENT)
Dept: HOSPITAL 68 - ERH | Age: 68
LOS: 12 days | DRG: 189 | End: 2018-07-20
Attending: INTERNAL MEDICINE | Admitting: INTERNAL MEDICINE
Payer: COMMERCIAL

## 2018-07-08 VITALS — WEIGHT: 134 LBS | HEIGHT: 60 IN | BODY MASS INDEX: 26.31 KG/M2

## 2018-07-08 VITALS — SYSTOLIC BLOOD PRESSURE: 134 MMHG | DIASTOLIC BLOOD PRESSURE: 66 MMHG

## 2018-07-08 DIAGNOSIS — N18.3: ICD-10-CM

## 2018-07-08 DIAGNOSIS — E78.5: ICD-10-CM

## 2018-07-08 DIAGNOSIS — I10: ICD-10-CM

## 2018-07-08 DIAGNOSIS — Z99.81: ICD-10-CM

## 2018-07-08 DIAGNOSIS — J96.21: Primary | ICD-10-CM

## 2018-07-08 DIAGNOSIS — D63.8: ICD-10-CM

## 2018-07-08 DIAGNOSIS — E87.70: ICD-10-CM

## 2018-07-08 DIAGNOSIS — I48.0: ICD-10-CM

## 2018-07-08 DIAGNOSIS — C34.92: ICD-10-CM

## 2018-07-08 DIAGNOSIS — Z98.61: ICD-10-CM

## 2018-07-08 DIAGNOSIS — Z90.710: ICD-10-CM

## 2018-07-08 DIAGNOSIS — J44.1: ICD-10-CM

## 2018-07-08 DIAGNOSIS — Z79.51: ICD-10-CM

## 2018-07-08 DIAGNOSIS — Z87.891: ICD-10-CM

## 2018-07-08 DIAGNOSIS — M54.9: ICD-10-CM

## 2018-07-08 DIAGNOSIS — E87.5: ICD-10-CM

## 2018-07-08 DIAGNOSIS — I25.2: ICD-10-CM

## 2018-07-08 DIAGNOSIS — J84.9: ICD-10-CM

## 2018-07-08 DIAGNOSIS — I25.10: ICD-10-CM

## 2018-07-08 DIAGNOSIS — B95.62: ICD-10-CM

## 2018-07-08 DIAGNOSIS — I13.0: ICD-10-CM

## 2018-07-08 DIAGNOSIS — I50.30: ICD-10-CM

## 2018-07-08 DIAGNOSIS — F41.9: ICD-10-CM

## 2018-07-08 DIAGNOSIS — B95.2: ICD-10-CM

## 2018-07-08 DIAGNOSIS — Z85.118: ICD-10-CM

## 2018-07-08 LAB
ERYTHROCYTE [DISTWIDTH] IN BLOOD BY AUTOMATED COUNT: 20.8 % (ref 11.5–14.5)
HCT VFR BLD CALC: 25.4 % (ref 37–47)
MCH RBC QN AUTO: 25.4 PG (ref 27–31)
MCHC RBC AUTO-ENTMCNC: 31.3 G/DL (ref 33–37)
MCV RBC AUTO: 81.1 FL (ref 81–99)
PLATELET # BLD: 441 /CUMM (ref 130–400)
PMV BLD AUTO: 8.5 FL (ref 7.4–10.4)
RED BLOOD CELL CT: 3.14 /CUMM (ref 4.2–5.4)
WBC # BLD AUTO: 10.3 /CUMM (ref 4.8–10.8)

## 2018-07-08 PROCEDURE — P9016 RBC LEUKOCYTES REDUCED: HCPCS

## 2018-07-08 PROCEDURE — A9558 XE133 XENON 10MCI: HCPCS

## 2018-07-08 PROCEDURE — A9540 TC99M MAA: HCPCS

## 2018-07-08 NOTE — HISTORY & PHYSICAL
Galo Atkins 07/08/18 1953:
General Information and HPI
History of Present Illness:
Abby Landers is a 67 YO female with a PMHx. of CAD, HTN, HLD, Squamous Cell 
Lung CA followed by Dr. Corcoran, CKD followed by Dr. Chacon, and paroxysmal 
Atrial Fibrillation not on anticoagulation due to previous GI bleed who presents
to the ED with a chief concern of "dyspnea." Patient states over the past three 
days she has been experiencing progressive dyspnea and that she "can't catch her
breath." Patient states she was last admitted in June and since then her 
breathing has gotten worse. Patient states that today she hit her life alert 
button after not getting relief with her rescue inhaler. Patient states she has 
also had pressure in her chest, though she atrributes it to her breathing 
difficulty. Patient admits to headaches and chronic low back pain but denies 
chest pain, loss of consciousness, syncope, fever, recent infection, cough, 
abdominal pain, dysuria, numbness, and weakness. Patient states additionally 
that she checks her blood pressure at home and that sometimes her systolic drops
into the 70s upon standing. Patient denies any recent environmental exposure, 
including asbestos and bird droppings.
 
Patient states she is a former smoker, who quit in the early 1990s. Patient says
prior to that time period she had been smoking about 1 pack per day. Patient 
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 
 
Patient pulmonologist is Dr. Corcoran, cardiologist is Dr. Murray, and 
nephrologist is Dr. Chacon.
 
Patient states she visited Dr. Corcoran last week and received a referral to an 
oncologist to evaluate a new left-sided lung mass that was positive for 
malignancy on biopsy.
 
Past History
 
Travel History
Traveled to [place] past 21 day No
 
Medical History
Neurological: NONE
EENT: NONE
Cardiovascular: CAD, hypertension, hyperlipidemia, myocardial infarction, STENTS
Respiratory: pulmonary fibrosis, PNEUMONIA ;copd;emphysema LUNG CA ILD
Gastrointestinal: peptic ulcer disease
Hepatic: NONE
Renal: chronic kidney disease
Musculoskeletal: disk herniation
Psychiatric: NONE, anxiety
Endocrine: NONE
Blood Disorders: anemia
Cancer(s): lung cancer
GYN/Reproductive: NONE
History of MRSA: No
History of VRE: No
History of CDIFF: No
 
Surgical History
Surgical History: STENTS (5) HYSTERECTOMY,LAMINECTOMY
 
Past Family/Social History
 
Family History
Relations & Conditions if any
MOTHER (Leukemia).
FATHER (Heart Attack).
SISTER (ovarian cancer).
BROTHER (Diabetes).
 
 
Psychosocial History
Who Do You Live With? self
Services at Home: Nursing, Physical Therapy
Primary Language: English
Smoking Status: Former Smoker (prev. 1 ppd)
 
Functional Ability
ADLs
Independent: dressing, eating, toileting, bathing. 
Ambulation: cane
IADLs
Independent: shopping, housework, finances, food prep, telephone, transportation
, medication admin. 
 
Review of Systems
 
Review of Systems
Constitutional:
Denies: chills, diaphoresis, fever, malaise. 
Cardiovascular:
Denies: chest pain, edema, palpitations. 
Respiratory:
Reports: short of breath.  Denies: cough. 
GI:
Denies: abdominal pain, diarrhea. 
Genitourinary:
Denies: dysuria, frequency. 
Musculoskeletal:
Reports: back pain.  Denies: joint pain. 
Skin:
Reports: change in skin color. 
Neurological/Psychological:
Reports: headache, tremors (Hands).  Denies: numbness, tingling. 
 
Exam & Diagnostic Data
Last 24 Hrs of Vital Signs/I&O
 Vital Signs
 
 
Date Time Temp Pulse Resp B/P B/P Pulse O2 O2 Flow FiO2
 
     Mean Ox Delivery Rate 
 
07/09 0121  94  144/70     
 
07/09 0030      96 Nasal 65% 
 
       Cannula  
 
07/08 2236      92 Nasal 6.0L 
 
       Cannula  
 
07/08 2215 98.2 98 24 134/66  92 Nasal 6.0L 
 
       Cannula  
 
07/08 2119 98.5 92 26 164/76  93 Nasal 6.0L 
 
       Cannula  
 
07/08 1814 99.0 84 28 165/70  96 Nasal 6.0L 
 
       Cannula  
 
07/08 1730       Nasal 6.0L 
 
       Cannula  
 
07/08 1715 97.9 84 36 154/64  92 Nasal 6.0L 
 
       Cannula  
 
 
 Intake & Output
 
 
 07/09 0800 07/09 0000 07/08 1600
 
Intake Total  0 
 
Output Total  0 
 
Balance  0 
 
    
 
Intake, Oral  0 
 
Output, Urine  0 
 
Patient  148 lb 
 
Weight   
 
Weight  Bed scale 
 
Measurement   
 
Method   
 
 
 
 
Physical Exam
General Appearance Alert, Oriented X3, No Acute Distress
HEENT Atraumatic, PERRLA
Neck Supple, No JVD
Lymphatic Cervical nl
Cardiovascular Normal S1, Normal S2, No Murmurs
Lungs Clear to Auscultation (small expiratory wheezes)
Abdomen Soft, No Tenderness
Neurological Strength at 5/5 X4 Ext, Normal Tone, Sensation Intact
 
Assessment/Plan
Assessment:
Assessment:
1. Acute on Chronic Hypoxemic Resp. Failure
2. Anemia of Chronic Disease
3. Hyperkalemia
4. h/o CAD, HTN, HLD
5. h/o Squamous Cell Lung CA
5. h/o CKD
 
Plan:
* Acute on Chronic Hypoxemic Resp. Failure
 - Acute worsening of patient's respiratory symptoms beyond baseline in patient 
with history of Chronic Obstructive Pulmonary Disease
 - Oxygen therapy to maintain saturation > 92% via NC or face mask
 - Solu-Medrol 40 mg q8 hours + Albuterol q4 hours prn
 - Azithromycin antibiotic regimen as prophylaxis for any perceived respiratory 
infection
 - CXR: Chronic obstructive pulmonary disease. Diffuse, nonspecific interstitial
opacity in both lungs has decreased/improved compared to 06/12/2018. Stable 
cardiomegaly.
 - DVT PPx.
* Anemia of Chronic Disease
 - Inflammation-mediated reduction in RBC production secondary to malignancy
 - Transfuse pRBC when Hgb < 7 g/dL and/or when symptomatic (lightheadedness, 
syncope, dizziness)
* Hyperkalemia
 - Elevated serum potassium likely secondary to tumor lysis and/or anemia
 - Managed with Kayexalate for elimination of potassium from the body
 - Dextrose + Insulin to shift potassium into cells
 
As Ranked By This Provider
Problem List:
 1. COPD (chronic obstructive pulmonary disease)
 
 2. Hyperkalemia
 
 3. Anemia
 
 
Core Measures/Misc (9/17)
 
Acute Coronary Syndrome
ACS Diagnosis: No
 
Congestive Heart Failure
Congestive Heart Failure Diagnosis No
 
Cerebrovascular Accident
CVA/TIA Diagnosis: No
 
VTE (View Protocol)
VTE Risk Factors Acute Medical Illness
No Mechanical VTE Prophylaxis d/t N/A MechProphylax Ordered
No VTE Pharm Prophylaxis d/t NA PharmProphylax ordered
 
Sepsis (View protocol)
Sepsis Present: No
If YES complete Sepsis Event Note If YES complete Sepsis Event Note
 
Don [place]nerdale 07/09/18 0208:
General Information and HPI
 
Allergies/Medications
Allergies:
Coded Allergies:
No Known Allergies (05/25/16)
 
Home Med list
Albuterol Sulfate (Proair Respiclick) 90 MCG AER.POW.BA   2 PUFF INH Q8P PRN 
COPD  (Reported)
Albuterol Sulfate 2.5 MG/3 ML (0.083 %) VIAL.NEB   1 Vial INH/SOL BID COPD  (
Reported)
Alprazolam 0.5 MG TABLET   1 TAB PO BID ANXIETY  (Reported)
Amiodarone HCl (Pacerone) 100 MG TABLET   100 MG PO EOD AFIB  (Reported)
Aspirin (Children's Aspirin) 81 MG TAB.CHEW   1 TAB PO DAILY HEART HEALTH  (
Reported)
Cholecalciferol (Vitamin D3) (Vitamin D3) 1,000 UNIT CAPSULE   1 CAP PO DAILY 
SUPPLEMENT  (Reported)
diphenhydrAMINE HCl (Benadryl) 25 MG CAPSULE   1 CAP PO QHS PRN SLEEP  (Reported
)
Epoetin Hermelindo (Epogen) (Unknown Strength) VIAL   (Unknown Dose) SC [id number] 
SUPPLEMENT  (Reported)
     Q 3 WEEKS, LAST TAKEN MONDAY 6/4/18)
Ezetimibe (Zetia) 10 MG TABLET   1 TAB PO DAILY CHOLESTEROL  (Reported)
Fluticasone/Vilanterol (Breo Ellipta 100-25 Mcg INH) 100 MCG-25 MCG/DOSE 
BLST.W.DEV   1 PUFF INH DAILY COPD  (Reported)
Furosemide 20 MG TABLET   20 MG PO EOD HEART  (Reported)
Levothyroxine Sodium 50 MCG TABLET   1 TAB PO DAILY THYROID  (Reported)
Metoprolol Succ XL (Toprol Xl) 50 MG TAB.ER.24H   1 TAB PO DAILY AFIB  (Reported
)
Mirtazapine 15 MG TABLET   1 TAB PO QHS DEPRESSION  (Reported)
Omeprazole 40 MG CAPSULE.DR   1 TAB PO DAILY HEARTBURN  (Reported)
Ranolazine (Ranexa) 500 MG TAB.ER.12H   1,000 MG PO BID ANGINA  (Reported)
Rosuvastatin Calcium (Crestor) 10 MG TABLET   1 TAB PO DAILY HEART HEALTH  (
Reported)
Sennosides/Docusate Sodium (Senokot-S Tablet) 8.6 MG-50 MG TABLET   1 TAB PO 
DAILY PRN CONSTIPATION   (Reported)
Umeclidinium Bromide (Incruse Ellipta) 62.5 MCG/ACTUATION BLST.W.DEV   1 PUFF 
INH DAILY COPD  (Reported)
 
 
Past History
 
Medical History
Cardiovascular: CAD, hypertension, hyperlipidemia, myocardial infarction
Respiratory: pulmonary fibrosis, PNEUMONIA ;copd;emphysema LUNG CA ILD
Gastrointestinal: peptic ulcer disease
Renal: chronic kidney disease
Psychiatric: anxiety
Cancer(s): lung cancer
 
Past Family/Social History
 
Psychosocial History
Smoking Status: Former Smoker
ETOH Use: denies use
Illicit Drug Use: denies illicit drug use
 
Review of Systems
Comments
12 point review of systems complete with positives in HPI
 
Exam & Diagnostic Data
Last 24 Hrs of Vital Signs/I&O
 Vital Signs
 
 
Date Time Temp Pulse Resp B/P B/P Pulse O2 O2 Flow FiO2
 
     Mean Ox Delivery Rate 
 
07/09 0121  94  144/70     
 
07/09 0030      96 Nasal 65% 
 
       Cannula  
 
07/08 2236      92 Nasal 6.0L 
 
       Cannula  
 
07/08 2215 98.2 98 24 134/66  92 Nasal 6.0L 
 
       Cannula  
 
07/08 2119 98.5 92 26 164/76  93 Nasal 6.0L 
 
       Cannula  
 
07/08 1814 99.0 84 28 165/70  96 Nasal 6.0L 
 
       Cannula  
 
07/08 1730       Nasal 6.0L 
 
       Cannula  
 
07/08 1715 97.9 84 36 154/64  92 Nasal 6.0L 
 
       Cannula  
 
 
 Intake & Output
 
 
 07/09 0800 07/09 0000 07/08 1600
 
Intake Total  0 
 
Output Total  0 
 
Balance  0 
 
    
 
Intake, Oral  0 
 
Output, Urine  0 
 
Patient  148 lb 
 
Weight   
 
Weight  Bed scale 
 
Measurement   
 
Method   
 
 
 
 
Physical Exam
General Appearance Alert, Oriented X3, Cooperative, No Acute Distress
Skin No Rashes, No Breakdown, No Significant Lesion
HEENT Atraumatic, PERRLA, EOMI
Neck Supple, No JVD
Lymphatic Axillary nl, Cervical nl
Cardiovascular Regular Rate, Normal S1, Normal S2
Lungs Clear to Auscultation, Normal Air Movement
Abdomen Normal Bowel Sounds
Neurological Normal Gait
Last 24 Hrs of Labs/Stephon:
 Laboratory Tests
 
07/09/18 0105:
Sodium Pending, Potassium Pending, Chloride Pending, Carbon Dioxide Pending, 
Anion Gap Pending, BUN Pending, Creatinine Pending, BUN/Creatinine Ratio Pending
, Troponin I Pending
 
07/09/18 0105:
Sodium Cancelled, Potassium Cancelled, Chloride Cancelled, Carbon Dioxide 
Cancelled, Anion Gap Cancelled, BUN Cancelled, Creatinine Cancelled, BUN/
Creatinine Ratio Cancelled
 
07/08/18 1823:
Anion Gap 12, Estimated GFR 23  L, BUN/Creatinine Ratio 18.1, Glucose 124  H, 
Calcium 8.9, Magnesium 1.9, Total Bilirubin 0.5, AST 26, ALT 23, Alkaline 
Phosphatase 147  H, Troponin I 0.06, Total Protein 6.4, Albumin 3.6, Globulin 
2.8, Albumin/Globulin Ratio 1.3, CBC w Diff MAN DIFF ORDERED, RBC 3.14  L, MCV 
81.1, MCH 25.4  L, MCHC 31.3  L, RDW 20.8  H, MPV 8.5, Segmented Neutrophils 78 
H, Lymphocytes 19  L, Monocytes 3, Nucleated RBCs 1  H, Platelet Estimate 
INCREASED, Polychromasia 1+, Hypochromic-Microcytic 1+, Anisocytosis 1+, 
Macrocytic Cells 1+
 
 
 
Core Measures/Misc (9/17)
 
Sepsis (View protocol)
If YES complete Sepsis Event Note If YES complete Sepsis Event Note
 
Attending MD Review Statement
 
Attending Statement
Attending MD Statement: examined this patient, discuss w/resident/PA/NP, amended
to note
Attending Assessment/Plan:
This patient is a 68-year-old female with a significant past medical history for
CAD, HTN, HLD, Squamous Cell Lung CA followed by Dr. Corcoran, CKD followed by Dr. Cahcon, and paroxysmal Atrial Fibrillation not on anticoagulation due to 
previous GI bleed who presents to the ED with a chief concern of "dyspnea." 
Patient states over the past three days she has been experiencing progressive 
dyspnea and that she "can't catch her breath." Patient states she was last 
admitted in June and since then her breathing has gotten worse.  The patient has
acute exacerbation of COPD and hyperkalemia.  Will treat for acute exacerbation 
of COPD and hyperkalemia.
 
 
Jayro Phillips 07/09/18 0304:
Core Measures/Misc (9/17)
 
Sepsis (View protocol)
If YES complete Sepsis Event Note If YES complete Sepsis Event Note
 
Resident Review Statement
Resident Statement: examined this patient, discussed with intern, agreed with 
intern
Other Findings:
Ms Landers is a 68 year old woman w/ a  PMHx of PCI (2001 and 2014) and STEMI 
s/p BEN to the RCA 4/2016, COPD, ILD, squamous cell lung cancer s/p gamma knife 
radiation on home O2, pAfib not on AC due to GI bleed and anemia, CKD,  HTN, 
hyperlipidemia,  and anxiety came in to the hospital w/ a chief concern of 
progressive dyspnea on exertion, nonproductive cough and weakness.  No fever, 
chills, chest pain or palpitations.  No dysuria or abdominal pain.  No nausea 
vomiting or diarrhea.  She also reported orthostasis, and increased flushing 
when she gets out of bed.  Blood pressure was known to be labile in the last few
weeks.Past smoker 40 pack year smoking history, family history of coronary 
artery disease, leukemia.
 
At the time of admission-temperature 97.9, pulse rate 84, respiration 36, blood 
pressure 156/64, pulse ox at 92% on 6 L.
 
General Exam: AAOx3, mild distress, Skin: No rashes, no breakdown;HEENT: PERRLA,
EOMI;Neck: Supple, No JVD; No cervical lymphadenopathy;CVS: rachycardia, Reg 
Rate, Normal S1,S2, No MGR; Resp: decreaed air entry, b/l wheezes, rales ;
Abdomen: Soft, No tenderness, Normal Bowel Sounds;Neuro: Normal Speech, Strength
5/5 b/l x 4 extremities, Sensation intact, CN III-XII NL, Reflexes 2+;
Extremities: No cyanosis, no pedal edema.EKG reveals-NSR, LAFB, No STTWI. 
 
Pertinent lab findings-
 
 
WBC 10.3, hemoglobin 8.0, hematocrit 25.4, MCV 81.1.  Platelet count 441.
 
Sodium 141, chloride 107, potassium 6.0, BUN 38, creatinine 2.1 (baseline 2.0), 
glucose 124.
 
AST 26, ALT 23, alkaline phosphatase 147.
 
Troponin I-0.06--> 0.05
 
 
 
 
Chest x-ray-- Chronic obstructive pulmonary disease. - Diffuse, nonspecific 
interstitial opacity in both lungs has decreased/improved compared to 06/12/
2018. Stable cardiomegaly.
 
Last echo   Normal left and right ventricular systolic function. No significant 
valvular abnormalities noted. 12 June 2018.  
 
Etiology in this case for airflow obstruction is likely multifactorial secondary
to COPD exacerbation, ILD, and Lung Ca. Frequent exacerbations are associated w/
greater mortality, faster decline in lung function. Etiology for acute 
exacerbation is likely viral infection or seasonal . Differential diagnosis 
considered at the time of admission-CHF, obstructive pneumonia,  acute coronary 
syndrome.  She will be mMRC dyspnea scale 4, Gold D.  In regards to her 
hyperkalemia, is likely from her renal insufficiency, which should be treated 
aggressively. She doesnt have any EKG changes s/o tall t waves or QTc, nor does 
she have any meds that can cause hyperkalemia, however pseudohyperkalemia should
be kept in differentials. 
 
Problem list:
1. COPD exacerbation
2. Hyperkalemia
3. CKD
4. h/o CAD 
5. h/o ILD, lung Ca ( moderately to poor differentiated squamaous cell ca- 
biopsy proven 6/18)
 
 
Plan:
 
-Advised the patient to telemetry
-C BC to monitor for infection
-Arterial blood gas, if indicated
-Check proBNP to differentiate between COPD and CHF, if she doensnt get better.
-Lower respiratory cultures
-supplemental oxygen
-Maintain oxygen saturation in between 90 to 92%
-monitor for CO2 narcosis, monitor for AMS, clinical tiring
-Bronchodilator therapy with albuterol and ipratropium
-Start iv predniosne 40 Q8
-Consider starting antibiotics, if the pt has fever or increased sputum 
production or worsening dyspne
-Caclcium gluconate, glucose+insulin. 
-Recheck K.
-Serial EKGs and Trops
-Cardiology consult
-Pulm consult in the am.
 
She likes to be DNR only. She can be intubated till her family arrives, if she 
is intubated.

## 2018-07-08 NOTE — ED DYSPNEA/ASTHMA COMPLAINT
History of Present Illness
 
General
Chief Complaint: Dyspnea (COPD, CHF, Other)
Stated Complaint: BIBA SOB
Source: patient, old records, EMS
Exam Limitations: no limitations
 
Vital Signs & Intake/Output
Vital Signs & Intake/Output
 Vital Signs
 
 
Date Time Temp Pulse Resp B/P B/P Pulse O2 O2 Flow FiO2
 
     Mean Ox Delivery Rate 
 
07/08 1814 99.0 84 28 165/70  96 Nasal 6.0L 
 
       Cannula  
 
07/08 1730       Nasal 6.0L 
 
       Cannula  
 
07/08 1715 97.9 84 36 154/64  92 Nasal 6.0L 
 
       Cannula  
 
 
 
Allergies
Coded Allergies:
No Known Allergies (05/25/16)
 
Reconcile Medications
Albuterol Sulfate (Proair Respiclick) 90 MCG AER.POW.BA   2 PUFF INH Q8P PRN 
COPD  (Reported)
Albuterol Sulfate 2.5 MG/3 ML (0.083 %) VIAL.NEB   1 Vial INH/SOL BID COPD  (
Reported)
Alprazolam 0.5 MG TABLET   1 TAB PO BID ANXIETY  (Reported)
Amiodarone HCl (Pacerone) 100 MG TABLET   100 MG PO EOD AFIB  (Reported)
Aspirin (Children's Aspirin) 81 MG TAB.CHEW   1 TAB PO DAILY HEART HEALTH  (
Reported)
Cholecalciferol (Vitamin D3) (Vitamin D3) 1,000 UNIT CAPSULE   1 CAP PO DAILY 
SUPPLEMENT  (Reported)
diphenhydrAMINE HCl (Benadryl) 25 MG CAPSULE   1 CAP PO QHS PRN SLEEP  (Reported
)
Epoetin Hermelindo (Epogen) (Unknown Strength) VIAL   (Unknown Dose) SC [id number] 
SUPPLEMENT  (Reported)
     Q 3 WEEKS, LAST TAKEN MONDAY 6/4/18)
Ezetimibe (Zetia) 10 MG TABLET   1 TAB PO DAILY CHOLESTEROL  (Reported)
Fluticasone/Vilanterol (Breo Ellipta 100-25 Mcg INH) 100 MCG-25 MCG/DOSE 
BLST.W.DEV   1 PUFF INH DAILY COPD  (Reported)
Furosemide 20 MG TABLET   20 MG PO EOD HEART  (Reported)
Levothyroxine Sodium 50 MCG TABLET   1 TAB PO DAILY THYROID  (Reported)
Metoprolol Succ XL (Toprol Xl) 50 MG TAB.ER.24H   1 TAB PO DAILY AFIB  (Reported
)
Mirtazapine 15 MG TABLET   1 TAB PO QHS DEPRESSION  (Reported)
Omeprazole 40 MG CAPSULE.DR   1 TAB PO DAILY HEARTBURN  (Reported)
Ranolazine (Ranexa) 500 MG TAB.ER.12H   1,000 MG PO BID ANGINA  (Reported)
Rosuvastatin Calcium (Crestor) 10 MG TABLET   1 TAB PO DAILY HEART HEALTH  (
Reported)
Sennosides/Docusate Sodium (Senokot-S Tablet) 8.6 MG-50 MG TABLET   1 TAB PO 
DAILY PRN CONSTIPATION   (Reported)
Umeclidinium Bromide (Incruse Ellipta) 62.5 MCG/ACTUATION BLST.W.DEV   1 PUFF 
INH DAILY COPD  (Reported)
 
Core Measure Meds Pre-Hospital aspirin
Triage Note:
68 YEAR OLD FEMALE BIBA FROM INDEPENDENT LIVING IN
[place] WITH SHORTNESS OF BREATH FOR A FEW DAYS
(WORSENING ALL DAY TODAY). HX COPD, EMPHYSEMA,
LUNG CA, AND PULMONARY FIBROSIS. SIMILAR EPISODE
HAPPENED APPROX 1 MONTH AGO AND PATIENT WAS
BROUGHT IN AND RECEIVED BLOOD TRANSFUSION. INITIAL
O2 SAT PER EMS WAS IN THE 70'S AND PATIENT PLACED
ON NON-REBREATHER. O2 SAT ON ARRIVAL 92% ON 6 L
N/C. FINGER STICK BLOOD SUGAR EN ROUTE .
PATIENT IS AWAKE, ALERT, ORIENTED, CALM, AND
COOPERATIVE. SKIN IS WARM AND DRY.
Triage Nurses Notes Reviewed? yes
Onset: 3 days
Duration: day(s):, constant, changing over time, continues in ED, getting worse
Timing: recent history
Severity: moderate, severe
Activities at Onset: rest
Prior Episodes/Possible Cause: chronic episodes
Modifying Factors:
Improves With: rest.  Worsens With: movement. 
Associated Symptoms: cough, wheezing, weakness
LMP (ages 10-50): post menopausal
Pregnant: No
Patient currently breastfeeds: No
HPI:
3 days prior to admission patient complains of progressive wheezing dyspnea on 
exertion nonproductive cough and weakness.
She denies fever chills nausea vomiting diarrhea abdominal pain chest pain 
headache dysuria rash bleeding.
 
Past History
 
Travel History
Traveled to [place] past 21 day No
 
Medical History
Any Pertinent Medical History? see below for history
Neurological: NONE
EENT: NONE
Cardiovascular: CAD, hypertension, hyperlipidemia, myocardial infarction, STENTS
Respiratory: pulmonary fibrosis, PNEUMONIA ;copd;emphysema LUNG CA ILD
Gastrointestinal: peptic ulcer disease
Hepatic: NONE
Renal: chronic kidney disease
Musculoskeletal: disk herniation
Psychiatric: NONE, anxiety
Endocrine: NONE
Blood Disorders: anemia
Cancer(s): lung cancer
GYN/Reproductive: NONE
History of MRSA: No
History of VRE: No
History of CDIFF: No
 
Surgical History
Surgical History: STENTS (5) HYSTERECTOMY,LAMINECTOMY
 
Psychosocial History
Who do you live with Patient/Self
Services at Home Nursing, Physical Therapy
What is your primary language English
Tobacco Use: Quit >30 days ago
 
Family History
Family History, If Any:
MOTHER (Leukemia).
FATHER (Heart Attack).
SISTER (ovarian cancer).
BROTHER (Diabetes).
 
Hx Contributory? No
 
Review of Systems
 
Review of Systems
Constitutional:
Reports: see HPI, weakness. 
EENTM:
Reports: no symptoms. 
Respiratory:
Reports: see HPI, cough, short of breath, wheezing.  Denies: sputum production. 
Cardiovascular:
Reports: no symptoms. 
GI:
Reports: no symptoms. 
Genitourinary:
Reports: no symptoms. 
Musculoskeletal:
Reports: no symptoms. 
Skin:
Reports: no symptoms. 
Neurological/Psychological:
Reports: no symptoms. 
Hematologic/Endocrine:
Reports: no symptoms. 
Immunologic/Allergic:
Reports: no symptoms. 
All Other Systems: Reviewed and Negative
 
Physical Exam
 
Physical Exam
General Appearance: well developed/nourished, alert, awake, anxious, moderate 
distress, thin
Head: atraumatic, normal appearance
Eyes:
Bilateral: normal appearance, PERRL, EOMI. 
Ears, Nose, Throat: normal pharynx, normal ENT inspection
Neck: normal inspection, supple, full range of motion, no midline tenderness
Respiratory: chest non-tender, quiet respiration, decreased breath sounds
Cardiovascular: regular rate/rhythm, normal peripheral pulses, norml femoral 
pulses equa
Peripheral Pulses:
4+ carotid (R), 4+ carotid (L)
Gastrointestinal: normal bowel sounds, soft, non-tender, no organomegaly
Extremities: normal inspection, normal capillary refill, normal range of motion,
no edema
Neurologic/Psych: no motor/sensory deficits, awake, alert, oriented x 3, normal 
mood/affect, CNs II-XII nml as tested
Skin: intact, normal color, warm/dry
Lymphatic: no anterior cervical brent
 
Core Measures
ACS in differential dx? No
CVA/TIA Diagnosis No
Sepsis Present: No
Sepsis Focused Exam Completed? No
 
Progress
Differential Diagnosis: asthma, CHF, COPD, pneumonia
Plan of Care:
 Orders
 
 
Procedure Date/time Status
 
Regular Diet 07/08 D Active
 
Patient Data 07/08 1848 Active
 
OXYGEN SETUP (GEN) 07/08 1845 Active
 
Saline Lock 07/08 1845 Active
 
Admit to inpatient 07/08 1845 Active
 
Vital Signs 07/08 1845 Active
 
Activity/Ambulation 07/08 1845 Active
 
Code Status 07/08 1845 Active
 
TROPONIN LEVEL 07/08 1733 Active
 
MAGNESIUM 07/08 1733 Active
 
COMPREHENSIVE METABOLIC PANEL 07/08 1733 Active
 
CBC WITHOUT DIFFERENTIAL 07/08 1733 Complete
 
Intake & Output 07/08 1717 Active
 
EKG 07/08 1709 Active
 
 
 Current Medications
 
 
  Sig/Enrique Start time  Last
 
Medication Dose  Stop Time Status Admin
 
Calcium Gluconate 1 GM ONCE ONE 07/08 1930 UNVr 
 
(Calcium Gluconate)   07/08 2029  
 
Sodium Chloride 100 ML    
 
(Normal Saline 0.9%)     
 
Ceftriaxone Sodium 1,000 MG ONCE ONE 07/08 1930 UNVr 
 
(Rocephin)   07/08 1931  
 
Sodium Polystyrene  60 ML ONCE ONE 07/08 1930 UNVr 
 
Sulfonate   07/08 1931  
 
(Kayexalate)     
 
 
 Laboratory Tests
 
 
 
07/08/18 1823:
Anion Gap 12, Estimated GFR 23  L, BUN/Creatinine Ratio 18.1, Glucose 124  H, 
Calcium 8.9, Magnesium 1.9, Total Bilirubin 0.5, AST 26, ALT 23, Alkaline 
Phosphatase 147  H, Troponin I Pending, Total Protein 6.4, Albumin 3.6, Globulin
2.8, Albumin/Globulin Ratio 1.3, CBC w Diff MAN DIFF ORDERED, RBC 3.14  L, MCV 
81.1, MCH 25.4  L, MCHC 31.3  L, RDW 20.8  H, MPV 8.5, Segmented Neutrophils 78 
H, Lymphocytes 19  L, Monocytes 3, Nucleated RBCs 1  H, Platelet Estimate 
INCREASED, Polychromasia 1+, Hypochromic-Microcytic 1+, Anisocytosis 1+, 
Macrocytic Cells 1+
 
Diagnostic Imaging:
Viewed by Me: Radiology Read.  Discussed w/RAD: Radiology Read. 
Radiology Impression: - Chronic obstructive pulmonary disease. - Diffuse, 
nonspecific interstitial opacity in both lungs has decreased/improved compared 
to 06/12/2018. - Stable cardiomegaly.
Initial ED EKG: normal axis, normal intervals, normal p-waves, normal QRS 
complex, normal sinus rhythm, no ST T wave changes
Prior EKG: unchanged
Rhythm Strip: normal sinus rhythm
 
Departure
 
Departure
Disposition: STILL A PATIENT
Condition: Fair
Clinical Impression
Primary Impression: Decompensated COPD with exacerbation (chronic obstructive 
pulmonary disease )
Secondary Impressions: Chronic disease anemia, CKD (chronic kidney disease), 
Hyperkalemia
Referrals:
Jacey NEWBERRY,Jaciel LUBIN (PCP/Family)
 
Departure Forms:
Customer Survey
General Discharge Information
 
Admission Note
Spoke With:
Johnathon Ochoa MD
Documentation of Exam:
Documentation of any treatments & extenuating circumstances including Concerns 
Regarding Discharge (functional status, medication knowledge or non-compliance, 
living conditions, etc.) that warrant an admission rather than observation: 
Serial beta agonist nebs IV steroids pulmonary evaluation medication adjustment 
physical therapy supplemental oxygen continuing care discharge planning.
 
 
Critical Care Note
 
Critical Care Note
Critical Care Time: 30-74 min (35)

## 2018-07-08 NOTE — RADIOLOGY REPORT
EXAMINATION:
XR PORTABLE CHEST
 
CLINICAL INFORMATION:
Cough, wheezing and hypoxia.
 
COMPARISON:
CXR from 12/20/2017 and 06/12/2018
 
TECHNIQUE:
Portable frontal view of the chest was obtained.
 
FINDINGS:
Chronic centrilobular emphysema.
 
Nonspecific, diffuse interstitial opacity has improved/decreased compared to
06/12/2018. No focal airspace opacification, pleural effusion or
pneumothorax.
 
Cardiac silhouette is chronically enlarged; it has a stable appearance
compared to 12/20/2017. Atherosclerotic calcification of the aorta. Also,
dense calcification of the right coronary artery is seen. The hilar contours
are normal.
 
Bones are diffusely osteopenic.
 
IMPRESSION:
- Chronic obstructive pulmonary disease.
- Diffuse, nonspecific interstitial opacity in both lungs has
decreased/improved compared to 06/12/2018.
- Stable cardiomegaly.

## 2018-07-09 VITALS — SYSTOLIC BLOOD PRESSURE: 138 MMHG | DIASTOLIC BLOOD PRESSURE: 70 MMHG

## 2018-07-09 VITALS — SYSTOLIC BLOOD PRESSURE: 144 MMHG | DIASTOLIC BLOOD PRESSURE: 80 MMHG

## 2018-07-09 VITALS — SYSTOLIC BLOOD PRESSURE: 98 MMHG | DIASTOLIC BLOOD PRESSURE: 52 MMHG

## 2018-07-09 LAB
ERYTHROCYTE [DISTWIDTH] IN BLOOD BY AUTOMATED COUNT: 21.1 % (ref 11.5–14.5)
ERYTHROCYTE [DISTWIDTH] IN BLOOD BY AUTOMATED COUNT: 21.1 % (ref 11.5–14.5)
HCT VFR BLD CALC: 23.5 % (ref 37–47)
HCT VFR BLD CALC: 23.8 % (ref 37–47)
MCH RBC QN AUTO: 24.9 PG (ref 27–31)
MCH RBC QN AUTO: 25.1 PG (ref 27–31)
MCHC RBC AUTO-ENTMCNC: 30.8 G/DL (ref 33–37)
MCHC RBC AUTO-ENTMCNC: 31 G/DL (ref 33–37)
MCV RBC AUTO: 80.8 FL (ref 81–99)
MCV RBC AUTO: 80.9 FL (ref 81–99)
PLATELET # BLD: 442 /CUMM (ref 130–400)
PLATELET # BLD: 461 /CUMM (ref 130–400)
PMV BLD AUTO: 8.8 FL (ref 7.4–10.4)
PMV BLD AUTO: 9.4 FL (ref 7.4–10.4)
RED BLOOD CELL CT: 2.9 /CUMM (ref 4.2–5.4)
RED BLOOD CELL CT: 2.94 /CUMM (ref 4.2–5.4)
WBC # BLD AUTO: 14.2 /CUMM (ref 4.8–10.8)
WBC # BLD AUTO: 9 /CUMM (ref 4.8–10.8)

## 2018-07-09 PROCEDURE — 30233N1 TRANSFUSION OF NONAUTOLOGOUS RED BLOOD CELLS INTO PERIPHERAL VEIN, PERCUTANEOUS APPROACH: ICD-10-PCS | Performed by: INTERNAL MEDICINE

## 2018-07-09 NOTE — CONS- CARDIOLOGY
General Information and HPI
 
Consulting Request
Date of Consult: 07/09/18
Requested By:
Chauncey Hurt MD
 
Reason for Consult:
Coronary artery disease, dyspnea
Source of Information: patient, old records
Exam Limitations: no limitations
History of Present Illness:
The patient is a 68-year-old woman with a past medical history of coronary 
artery disease (PCI in 2001 in 2014, as well as drug-eluting stents to the RCA 
in 2016) COPD, social lung disease, lung CA status post gamma knife radiation, 
paroxysmal atrial fibrillation (not on anticoagulation secondary to GI bleeds 
and anemia), chronic kidney disease and hypertension.  She presents to our 
hospital with symptoms of increasing severe dyspnea and weakness.
 
The patient has been noting increasing dyspnea at home over the past several 
days which has been refractory to the use of rescue asthma inhalers.  She 
otherwise denied symptoms of chest pains nor palpitations at that time.
 
Of note, the patient does describe a weight gain of approximately 10 pounds over
a period of approximately 4 weeks (on her home scale) sees, and has noted 
increasing leg edema.
 
On arrival, she was noted to have an elevated BNP Arison to her baseline (
current 4500).  She has subsequently ruled out for myocardial infarction via 
serial troponin isoenzymes.
 
Allergies/Medications
Allergies:
Coded Allergies:
No Known Allergies (05/25/16)
 
Home Med List:
Albuterol Sulfate (Proair Respiclick) 90 MCG AER.POW.BA   2 PUFF INH Q8P PRN 
COPD  (Reported)
Albuterol Sulfate 2.5 MG/3 ML (0.083 %) VIAL.NEB   1 Vial INH/SOL BID COPD  (
Reported)
Alprazolam 0.5 MG TABLET   1 TAB PO BID ANXIETY  (Reported)
Amiodarone HCl (Pacerone) 100 MG TABLET   100 MG PO EOD AFIB  (Reported)
Aspirin (Children's Aspirin) 81 MG TAB.CHEW   1 TAB PO DAILY HEART HEALTH  (
Reported)
Cholecalciferol (Vitamin D3) (Vitamin D3) 1,000 UNIT CAPSULE   1 CAP PO DAILY 
SUPPLEMENT  (Reported)
diphenhydrAMINE HCl (Benadryl) 25 MG CAPSULE   1 CAP PO QHS PRN SLEEP  (Reported
)
Epoetin Hermelindo (Epogen) (Unknown Strength) VIAL   (Unknown Dose) SC [id number] 
SUPPLEMENT  (Reported)
     Q 3 WEEKS, LAST TAKEN MONDAY 6/4/18)
Ezetimibe (Zetia) 10 MG TABLET   1 TAB PO DAILY CHOLESTEROL  (Reported)
Fluticasone/Vilanterol (Breo Ellipta 100-25 Mcg INH) 100 MCG-25 MCG/DOSE 
BLST.W.DEV   1 PUFF INH DAILY COPD  (Reported)
Furosemide 20 MG TABLET   20 MG PO EOD HEART  (Reported)
Levothyroxine Sodium 50 MCG TABLET   1 TAB PO DAILY THYROID  (Reported)
Metoprolol Succ XL (Toprol Xl) 50 MG TAB.ER.24H   1 TAB PO DAILY AFIB  (Reported
)
Mirtazapine 15 MG TABLET   1 TAB PO QHS DEPRESSION  (Reported)
Omeprazole 40 MG CAPSULE.DR   1 TAB PO DAILY HEARTBURN  (Reported)
Ranolazine (Ranexa) 500 MG TAB.ER.12H   1,000 MG PO BID ANGINA  (Reported)
Rosuvastatin Calcium (Crestor) 10 MG TABLET   1 TAB PO DAILY HEART HEALTH  (
Reported)
Sennosides/Docusate Sodium (Senokot-S Tablet) 8.6 MG-50 MG TABLET   1 TAB PO 
DAILY PRN CONSTIPATION   (Reported)
Umeclidinium Bromide (Incruse Ellipta) 62.5 MCG/ACTUATION BLST.W.DEV   1 PUFF 
INH DAILY COPD  (Reported)
 
Current Medications:
 Current Medications
 
 
  Sig/Enrique Start time  Last
 
Medication Dose Route Stop Time Status Admin
 
Acetaminophen 650 MG Q8P PRN 07/08 2100 AC 
 
  PO   
 
Albuterol Sulfate 3 ML BID 07/09 0900 AC 
 
  INH   
 
Albuterol Sulfate 3 ML Q4P PRN 07/08 2100 AC 
 
  INH   
 
Albuterol Sulfate 3 ML ONCE ONE 07/08 1745 DC 07/08
 
  INH 07/08 1746  1744
 
Albuterol Sulfate 3 ML ONCE ONE 07/08 1745 DC 07/08
 
  INH 07/08 1746  1745
 
Alprazolam 0.5 MG BID 07/08 2100 AC 07/09
 
  PO 07/15 2059  0735
 
Amiodarone HCl 100 MG Q48 07/09 0900 AC 07/09
 
  PO   1008
 
Aspirin 81 MG DAILY 07/09 0900 AC 07/09
 
  PO   1007
 
Atorvastatin Calcium 40 MG 1700 07/09 1700 AC 
 
  PO   
 
Azithromycin 500 MG DAILY 07/09 0900 AC 07/09
 
Sodium Chloride 250 ML IV   1027
 
Budesonide/ 2 PUF BID 07/09 1030 AC 
 
Formoterol Fumarate  INH   
 
Calcium Gluconate 1 GM ONCE ONE 07/08 1930 DC 07/08
 
Sodium Chloride 100 ML IV 07/08 2029  2257
 
Calcium Gluconate 0 .STK-MED ONE 07/08 1927 DC 
 
  IV   
 
Ceftriaxone Sodium 1,000 MG ONCE ONE 07/08 1930 DC 07/08
 
  IV 07/08 1931  2259
 
Ceftriaxone Sodium 0 .STK-MED ONE 07/08 1926 DC 
 
  .ROUTE   
 
Dextrose 25 GM ONCE ONE 07/08 2100 DC 07/08
 
  IV 07/08 2101  2320
 
Ezetimibe 10 MG DAILY 07/09 0900 AC 07/09
 
  PO   1010
 
Furosemide 20 MG Q48 07/09 0900 AC 07/09
 
  PO   1008
 
Heparin Sodium  5,000 UNIT Q8 07/08 2200 AC 07/09
 
(Porcine)  SC   0501
 
Insulin Human Regular 10 UNITS ONCE ONE 07/08 2100 DC 07/08
 
  IV 07/08 2101  2320
 
Ipratropium Bromide 2.5 ML ONCE ONE 07/08 1745 DC 07/08
 
  INH 07/08 1746  1744
 
Levothyroxine Sodium 0.05 MG 0700 07/10 0700 AC 
 
  PO   
 
Methylprednisolone 40 MG Q8 07/09 0600 AC 07/09
 
  IV   0501
 
Methylprednisolone 0 .STK-MED ONE 07/08 1752 DC 
 
  .ROUTE   
 
Methylprednisolone 125 MG ONCE ONE 07/08 1745 DC 07/08
 
  IV 07/08 1746  1811
 
Metoprolol Succinate 50 MG DAILY 07/09 0900 AC 07/09
 
  PO   1010
 
Mirtazapine 15 MG AT BEDTIME 07/09 0015 AC 07/09
 
  PO   0120
 
Non-Formulary  0 SEE ADMIN CRITERIA 07/09 0800 DC 
 
Medication  ANY   
 
Omeprazole 40 MG DAILY AC 07/10 0700 AC 
 
  PO   
 
Ranolazine 1,000 MG BID 07/09 0002 AC 07/09
 
  PO   0733
 
Senna/Docusate Sodium 1 TAB AT BEDTIME 07/08 2100 AC 07/08
 
  PO   2258
 
Sodium Polystyrene  60 ML ONCE ONE 07/08 1930 DC 07/08
 
Sulfonate  PO 07/08 1931  1937
 
Sodium Polystyrene  0 .STK-MED ONE 07/08 1927 DC 
 
Sulfonate  .ROUTE   
 
 
 
 
Review of Systems
Review of Systems:
The review of systems is negative for chest pains, palpitations nor 
lightheadedness, positive for dyspnea as above.
The remainder of the 14 point review of systems is noncontributory with the 
exception of above.
 
Past History
 
Travel History
Traveled to [place] past 21 day No
 
Medical History
Blood Transfusion Hx: Yes
Neurological: NONE
EENT: NONE
Cardiovascular: CAD, hypertension, hyperlipidemia, myocardial infarction
Respiratory: pulmonary fibrosis, PNEUMONIA ;copd;emphysema LUNG CA ILD
Gastrointestinal: peptic ulcer disease
Hepatic: NONE
Renal: chronic kidney disease
Musculoskeletal: disk herniation
Psychiatric: anxiety
Endocrine: NONE
Blood Disorders: anemia
Cancer(s): lung cancer
GYN/Reproductive: NONE
 
Surgical History
Surgical History: STENTS (5) HYSTERECTOMY,LAMINECTOMY
 
Family History
Relations & Conditions If Any:
MOTHER (Leukemia).
FATHER (Heart Attack).
SISTER (ovarian cancer).
BROTHER (Diabetes).
 
 
Psychosocial History
Where Do You Live? Home
Who Do You Live With? self
Services at Home: Nursing, Physical Therapy
Primary Language: English
Smoking Status: Former Smoker
ETOH Use: denies use
Illicit Drug Use: denies illicit drug use
 
Functional Ability
ADLs
Independent: dressing, eating, toileting, bathing. 
Ambulation: cane
IADLs
Independent: shopping, housework, finances, food prep, telephone, transportation
, medication admin. 
 
Exam & Diagnostic Data
Vital Signs and I&O
Vital Signs
 
 
Date Time Temp Pulse Resp B/P B/P Pulse O2 O2 Flow FiO2
 
     Mean Ox Delivery Rate 
 
07/09 1130       Nasal 65% 
 
       Cannula  
 
07/09 1010  104  160/88     
 
07/09 1008  104  160/88     
 
07/09 0800      98 Nasal 65% 
 
       Cannula  
 
07/09 0733  97  140/60     
 
07/09 0647 98.7 97 22 144/80  93 Nasal  
 
       Cannula  
 
07/09 0121  94  144/70     
 
07/09 0030      96 Nasal 65% 
 
       Cannula  
 
07/08 2236      92 Nasal 6.0L 
 
       Cannula  
 
07/08 2215 98.2 98 24 134/66  92 Nasal 6.0L 
 
       Cannula  
 
07/08 2119 98.5 92 26 164/76  93 Nasal 6.0L 
 
       Cannula  
 
07/08 1814 99.0 84 28 165/70  96 Nasal 6.0L 
 
       Cannula  
 
07/08 1730       Nasal 6.0L 
 
       Cannula  
 
07/08 1715 97.9 84 36 154/64  92 Nasal 6.0L 
 
       Cannula  
 
 
 Intake & Output
 
 
 07/09 1600 07/09 0800 07/09 0000 07/08 1600 07/08 0800 07/08 0000
 
Intake Total   0   
 
Output Total  300 0   
 
Balance  -300 0   
 
       
 
Intake, Oral   0   
 
Output, Urine  300 0   
 
Patient   148 lb   
 
Weight      
 
Weight   Bed scale   
 
Measurement      
 
Method      
 
 
 
Physical Exam:
General: Nontoxic, no apparent distress.
HEENT: Sclera and conjunctiva within normal limits, without xanthelasmas.
Neck: Carotids 2+ without bruits.
Respiratory: Scattered rhonchi and rales, air movement is decreased throughout, 
without accessory respiratory muscle use.
Heart: Regular rate and rhythm, without murmurs, without JVD.
Abdomen: Soft, nontender, no masses, normoactive bowel sounds.
Extremities: Without clubbing, cyanosis, without edema.
Neuro: Nonfocal exam, strength, 5 out of 5
Skin: Within normal limits without lesions.
Psych: Mood and affect: Normal
Labs/Stephon Results:
 Laboratory Tests
 
 
 07/09 07/09 07/09
 
 0710 0105 0105
 
Chemistry   
 
  Sodium (137 - 145 mmol/L) 143 141 Cancelled
 
  Potassium (3.5 - 5.1 mmol/L) 4.6 5.0 Cancelled
 
  Chloride (98 - 107 mmol/L) 110  H 109  H Cancelled
 
  Carbon Dioxide (22 - 30 mmol/L) 20  L 17  L Cancelled
 
  Anion Gap (5 - 16) 13 16 Cancelled
 
  BUN (7 - 17 mg/dL) 32  H 35  H Cancelled
 
  Creatinine (0.5 - 1.0 mg/dL) 1.6  H 1.8  H Cancelled
 
  Estimated GFR (>60 ml/min) 32  L 28  L 
 
  BUN/Creatinine Ratio (7 - 25 %) 20.0 19.4 Cancelled
 
  Troponin I (< 0.11 ng/ml) 0.04 0.05 
 
Hematology   
 
  CBC w Diff MAN DIFF ORDERED  
 
  WBC (4.8 - 10.8 /CUMM) 9.0  
 
  RBC (4.20 - 5.40 /CUMM) 2.90  L  
 
  Hgb (12.0 - 16.0 G/DL) 7.2 *L  
 
  Hct (37 - 47 %) 23.5  L  
 
  MCV (81.0 - 99.0 FL) 80.8  L  
 
  MCH (27.0 - 31.0 PG) 24.9  L  
 
  MCHC (33.0 - 37.0 G/DL) 30.8  L  
 
  RDW (11.5 - 14.5 %) 21.1  H  
 
  Plt Count (130 - 400 /CUMM) 442  H  
 
  MPV (7.4 - 10.4 FL) 8.8  
 
  Segmented Neutrophils (42.2 - 75.2 %) 97  H  
 
  Lymphocytes (20.5 - 51.1 %) 2  L  
 
  Monocytes (1.7 - 9.3 %) 1  L  
 
  Platelet Estimate (ADEQUATE) INCREASED  
 
  Hypochromic-Microcytic 1+  
 
  Anisocytosis 1+  
 
  Macrocytic Cells FEW  
 
 
 
 
 07/08
 
 1823
 
Chemistry 
 
  Sodium (137 - 145 mmol/L) 141
 
  Potassium (3.5 - 5.1 mmol/L) 6.0 *H
 
  Chloride (98 - 107 mmol/L) 107
 
  Carbon Dioxide (22 - 30 mmol/L) 22
 
  Anion Gap (5 - 16) 12
 
  BUN (7 - 17 mg/dL) 38  H
 
  Creatinine (0.5 - 1.0 mg/dL) 2.1  H
 
  Estimated GFR (>60 ml/min) 23  L
 
  BUN/Creatinine Ratio (7 - 25 %) 18.1
 
  Glucose (65 - 99 mg/dL) 124  H
 
  Calcium (8.4 - 10.2 mg/dL) 8.9
 
  Magnesium (1.6 - 2.3 mg/dL) 1.9
 
  Total Bilirubin (0.2 - 1.3 mg/dL) 0.5
 
  AST (14 - 36 U/L) 26
 
  ALT (9 - 52 U/L) 23
 
  Alkaline Phosphatase (<127 U/L) 147  H
 
  Troponin I (< 0.11 ng/ml) 0.06
 
  Total Protein (6.3 - 8.2 g/dL) 6.4
 
  Albumin (3.5 - 5.0 g/dL) 3.6
 
  Globulin (1.9 - 4.2 gm/dL) 2.8
 
  Albumin/Globulin Ratio (1.1 - 2.2 %) 1.3
 
Hematology 
 
  CBC w Diff MAN DIFF ORDERED
 
  WBC (4.8 - 10.8 /CUMM) 10.3
 
  RBC (4.20 - 5.40 /CUMM) 3.14  L
 
  Hgb (12.0 - 16.0 G/DL) 8.0  L
 
  Hct (37 - 47 %) 25.4  L
 
  MCV (81.0 - 99.0 FL) 81.1
 
  MCH (27.0 - 31.0 PG) 25.4  L
 
  MCHC (33.0 - 37.0 G/DL) 31.3  L
 
  RDW (11.5 - 14.5 %) 20.8  H
 
  Plt Count (130 - 400 /CUMM) 441  H
 
  MPV (7.4 - 10.4 FL) 8.5
 
  Segmented Neutrophils (42.2 - 75.2 %) 78  H
 
  Lymphocytes (20.5 - 51.1 %) 19  L
 
  Monocytes (1.7 - 9.3 %) 3
 
  Nucleated RBCs (0.0 - 0.0 /100WBC) 1  H
 
  Platelet Estimate (ADEQUATE) INCREASED
 
  Polychromasia 1+
 
  Hypochromic-Microcytic 1+
 
  Anisocytosis 1+
 
  Macrocytic Cells 1+
 
 
 
 
Assessment/Plan
Assessment/Plan
68-year-old woman with a past medical history of coronary artery disease (PCI in
2001 in 2014, as well as drug-eluting stents to the RCA in 2016) COPD, social 
lung disease, lung CA status post gamma knife radiation, paroxysmal atrial 
fibrillation (not on anticoagulation secondary to GI bleeds and anemia), chronic
kidney disease and hypertension.  She presents to our hospital with symptoms of 
increasing severe dyspnea and weakness, which has been refractory to her rescue 
asthma inhalers.  She has well has a noted approximate 10 pound weight gain over
the past several weeks.
 
Dyspnea:
The patient presents with dyspnea which is most consistent with a combined 
underlying COPD exacerbation and likely increasing fluid overload.  A recent 
echocardiogram performed demonstrated preserved LV systolic function, and a 
repeat of the same is currently on necessary.  We will continue treatment 
aggressively as per pulmonary as well as attempt further diuresis with Lasix, 
targeting a net output of approximately 1 L per day.
 
Anemia:
The patient has been receiving Procrit for her chronic anemia.  We will continue
with the same as per nephrology.
 
Atrial fibrillation:
Patient has known atrial fibrillation and is not anticoagulated secondary to a 
history of GI bleeds and anemia.  We will continue to maintain rate control.
 
Thank you for allowing us to participate in the care of your patient.
Please do not hesitate to contact us further with any questions.
 
Sincerely,
Andrea Hartman MD [facility]
 
 
Consult Acknowledgment
- Thank you for your consult request.

## 2018-07-09 NOTE — CT SCAN REPORT
EXAMINATION:
CT CHEST WITHOUT CONTRAST
 
CLINICAL INFORMATION:
Shortness of breath. Presumptive diagnosis: Malignancy.
 
COMPARISON:
Radiograph dated 07/08/2018 and CT dated 06/10/2018.
 
TECHNIQUE:
Multidetector volumetric CT imaging of the chest was done. Axial MIP volume
rendering provided. Sagittal and coronal reformatted images were obtained.
 
DLP:
208 mGy-cm
 
FINDINGS:
 
LUNGS: Again seen are diffuse paraseptal and centrilobular emphysema, most
severe at the upper lobes. Peripheral interlobular septal thickening, and
honeycombing are evident at the lung bases and posterior/inferior aspects of
the upper lobes. The increased interstitial opacification in these regions of
fibrotic change could be due to atelectasis or superimposed interstitial
edema. No new areas of consolidation are identified. There is persistent
dense consolidation in the right lower lobe with surgical markers, not
significantly change from prior.
 
The spiculated, solid, anterior lingular nodule is unchanged in size from
prior, measuring 2.4 x 2.1 cm by my measurement. This corresponds to the
biopsied squamous cell carcinoma.
 
MEDIASTINUM: Mild cardiomegaly is again noted. Calcific atherosclerosis is
present in the coronary arteries. Thoracic aorta is normal in caliber.
Calcific atherosclerosis is present in the thoracic aorta. Thyroid gland is
unremarkable.
 
The 1 cm AP angle lymph node is unchanged. 1 cm (short axis) precarinal nodes
are again noted. Sensitivity for hilar adenopathy is limited without
intravenous contrast, though there is suspicion for hilar adenopathy on the
right.
 
PLEURA: No significant pleural effusion.
 
AXILLA: No lymphadenopathy.
 
UPPER ABDOMEN: Unremarkable.
 
OSSEOUS STRUCTURES: No suspicious bony lesions.
 
IMPRESSION:
Diffuse pulmonary emphysema with chronic interstitial pneumonitis, likely UIP
pattern. The density of the fibrotic interstitial changes increased from the
recent CT from 06/10/2018 which could be due to atelectasis, interstitial
edema, pneumonitis, or drug related phenomenon.
 
Cardiomegaly
 
Unchanged 2.4 cm squamous cell carcinoma in the lingula. Mild mediastinal
adenopathy is unchanged as well.

## 2018-07-09 NOTE — CONS- PULMONARY
General Information and HPI
 
Consulting Request
Date of Consult: 07/09/18
Requested By:
Dr. Hurt
Reason for Consult:
AECOPD
Source of Information: patient, old records
Exam Limitations: no limitations
History of Present Illness:
The patient is a 68 year old female, followed by Dr. Corcoran as an outpatient.  
She has a complicated past medical history which includes severe COPD, and 
lingular moderate to poorly differentiated squamous cell carcinoma, recently 
identified by IR guided needle biopsy.  She has been referred to Dr. Verdin for 
evaluation.  She has not yet been seen as an outpatient for her malignancy.  She
has chronic dyspnea at baseline, on supplemental oxygen.  The patient also has a
history significant for CAD, hypertension, hyperlipidemia, chronic kidney 
disease, and paroxysmal atrial fibrillation not on anticoagulation due to 
previous GI bleed.  The patient reported to the emergency department on [id number] 
with increased shortness of breath.  She was symptomatic for 3 days prior to 
admission.  The patient was using her rescue inhaler with no relief.  She also 
complained of chest pressure but attributed this to her struggling to breathe.  
The patient had no complaints of chest pain.  She denied any fever, cough, 
abdominal pain, nausea, or vomiting she complains of severe anxiety.  The 
patient is currently on nebs/total respiratory care, and high flow oxygen.  She 
is saturating in the low 90s with 65% oxygen; flow was at 45 L/min.
 
Allergies/Medications
Allergies:
Coded Allergies:
No Known Allergies (05/25/16)
 
Home Med List:
Albuterol Sulfate (Proair Respiclick) 90 MCG AER.POW.BA   2 PUFF INH Q8P PRN 
COPD  (Reported)
Albuterol Sulfate 2.5 MG/3 ML (0.083 %) VIAL.NEB   1 Vial INH/SOL BID COPD  (
Reported)
Alprazolam 0.5 MG TABLET   1 TAB PO BID ANXIETY  (Reported)
Amiodarone HCl (Pacerone) 100 MG TABLET   100 MG PO EOD AFIB  (Reported)
Aspirin (Children's Aspirin) 81 MG TAB.CHEW   1 TAB PO DAILY HEART HEALTH  (
Reported)
Cholecalciferol (Vitamin D3) (Vitamin D3) 1,000 UNIT CAPSULE   1 CAP PO DAILY 
SUPPLEMENT  (Reported)
diphenhydrAMINE HCl (Benadryl) 25 MG CAPSULE   1 CAP PO QHS PRN SLEEP  (Reported
)
Epoetin Hermelindo (Epogen) (Unknown Strength) VIAL   (Unknown Dose) SC [id number] 
SUPPLEMENT  (Reported)
     Q 3 WEEKS, LAST TAKEN MONDAY 6/4/18)
Ezetimibe (Zetia) 10 MG TABLET   1 TAB PO DAILY CHOLESTEROL  (Reported)
Fluticasone/Vilanterol (Breo Ellipta 100-25 Mcg INH) 100 MCG-25 MCG/DOSE 
BLST.W.DEV   1 PUFF INH DAILY COPD  (Reported)
Furosemide 20 MG TABLET   20 MG PO EOD HEART  (Reported)
Levothyroxine Sodium 50 MCG TABLET   1 TAB PO DAILY THYROID  (Reported)
Metoprolol Succ XL (Toprol Xl) 50 MG TAB.ER.24H   1 TAB PO DAILY AFIB  (Reported
)
Mirtazapine 15 MG TABLET   1 TAB PO QHS DEPRESSION  (Reported)
Omeprazole 40 MG CAPSULE.DR   1 TAB PO DAILY HEARTBURN  (Reported)
Ranolazine (Ranexa) 500 MG TAB.ER.12H   1,000 MG PO BID ANGINA  (Reported)
Rosuvastatin Calcium (Crestor) 10 MG TABLET   1 TAB PO DAILY HEART HEALTH  (
Reported)
Sennosides/Docusate Sodium (Senokot-S Tablet) 8.6 MG-50 MG TABLET   1 TAB PO 
DAILY PRN CONSTIPATION   (Reported)
Umeclidinium Bromide (Incruse Ellipta) 62.5 MCG/ACTUATION BLST.W.DEV   1 PUFF 
INH DAILY COPD  (Reported)
 
Current Medications:
 Current Medications
 
 
  Sig/Enrique Start time  Last
 
Medication Dose Route Stop Time Status Admin
 
Acetaminophen 650 MG Q8P PRN 07/08 2100 AC 
 
  PO   
 
Albuterol Sulfate 3 ML BID 07/09 0900 AC 
 
  INH   
 
Albuterol Sulfate 3 ML Q4P PRN 07/08 2100 AC 
 
  INH   
 
Albuterol Sulfate 3 ML ONCE ONE 07/08 1745 DC 07/08
 
  INH 07/08 1746  1744
 
Albuterol Sulfate 3 ML ONCE ONE 07/08 1745 DC 07/08
 
  INH 07/08 1746  1745
 
Alprazolam 0.5 MG BID 07/08 2100 AC 07/09
 
  PO 07/15 2059  0735
 
Amiodarone HCl 100 MG Q48 07/09 0900 AC 07/09
 
  PO   1008
 
Aspirin 81 MG DAILY 07/09 0900 AC 07/09
 
  PO   1007
 
Atorvastatin Calcium 40 MG 1700 07/09 1700 AC 
 
  PO   
 
Azithromycin 500 MG DAILY 07/09 0900 AC 07/09
 
Sodium Chloride 250 ML IV   1027
 
Budesonide/ 2 PUF BID 07/09 1030 AC 
 
Formoterol Fumarate  INH   
 
Calcium Gluconate 1 GM ONCE ONE 07/08 1930 DC 07/08
 
Sodium Chloride 100 ML IV 07/08 2029 2257
 
Calcium Gluconate 0 .STK-MED ONE 07/08 1927 DC 
 
  IV   
 
Ceftriaxone Sodium 1,000 MG ONCE ONE 07/08 1930 DC 07/08
 
  IV 07/08 1931  2259
 
Ceftriaxone Sodium 0 .STK-MED ONE 07/08 1926 DC 
 
  .ROUTE   
 
Dextrose 25 GM ONCE ONE 07/08 2100 DC 07/08
 
  IV 07/08 2101  2320
 
Ezetimibe 10 MG DAILY 07/09 0900 AC 07/09
 
  PO   1010
 
Furosemide 20 MG Q48 07/09 0900 AC 07/09
 
  PO   1008
 
Heparin Sodium  5,000 UNIT Q8 07/08 2200 AC 07/09
 
(Porcine)  SC   0501
 
Insulin Human Regular 10 UNITS ONCE ONE 07/08 2100 DC 07/08
 
  IV 07/08 2101  2320
 
Ipratropium Bromide 2.5 ML ONCE ONE 07/08 1745 DC 07/08
 
  INH 07/08 1746  1744
 
Levothyroxine Sodium 0.05 MG 0700 07/10 0700 AC 
 
  PO   
 
Methylprednisolone 40 MG Q8 07/09 0600 AC 07/09
 
  IV   0501
 
Methylprednisolone 0 .STK-MED ONE 07/08 1752 DC 
 
  .ROUTE   
 
Methylprednisolone 125 MG ONCE ONE 07/08 1745 DC 07/08
 
  IV 07/08 1746  1811
 
Metoprolol Succinate 50 MG DAILY 07/09 0900 AC 07/09
 
  PO   1010
 
Mirtazapine 15 MG AT BEDTIME 07/09 0015 AC 07/09
 
  PO   0120
 
Non-Formulary  0 SEE ADMIN CRITERIA 07/09 0800 DC 
 
Medication  ANY   
 
Omeprazole 40 MG DAILY AC 07/10 0700 AC 
 
  PO   
 
Ranolazine 1,000 MG BID 07/09 0002 AC 07/09
 
  PO   0733
 
Senna/Docusate Sodium 1 TAB AT BEDTIME 07/08 2100 AC 07/08
 
  PO   2258
 
Sodium Polystyrene  60 ML ONCE ONE 07/08 1930 DC 07/08
 
Sulfonate  PO 07/08 1931  1937
 
Sodium Polystyrene  0 .STK-MED ONE 07/08 1927 DC 
 
Sulfonate  .ROUTE   
 
 
 
 
Review of Systems
 
Review of Systems
Constitutional:
Reports: malaise, weakness.  Denies: chills, diaphoresis, fever. 
Cardiovascular:
Reports: edema, orthopena.  Denies: chest pain, palpitations, syncope. 
Respiratory:
Reports: orthopnea, short of breath, wheezing.  Denies: cough, hemoptysis, 
sputum production, stridor. 
GI:
Denies: abdominal pain, bloating, constipation, diarrhea, bloody stool. 
Genitourinary:
Denies: no symptoms. 
All Other Systems: Reviewed and Negative
 
Past History
 
Travel History
Traveled to [place] past 21 day No
 
Medical History
Blood Transfusion Hx: Yes
Neurological: NONE
EENT: NONE
Cardiovascular: CAD, hypertension, hyperlipidemia, myocardial infarction
Respiratory: pulmonary fibrosis, PNEUMONIA ;copd;emphysema LUNG CA ILD
Gastrointestinal: peptic ulcer disease
Hepatic: NONE
Renal: chronic kidney disease
Musculoskeletal: disk herniation
Psychiatric: anxiety
Endocrine: NONE
Blood Disorders: anemia
Cancer(s): lung cancer
GYN/Reproductive: NONE
 
Surgical History
Surgical History: STENTS (5) HYSTERECTOMY,LAMINECTOMY
 
Family History
Relations & Conditions If Any:
MOTHER (Leukemia).
FATHER (Heart Attack).
SISTER (ovarian cancer).
BROTHER (Diabetes).
 
 
Psychosocial History
Where Do You Live? Home
Who Do You Live With? self
Services at Home: Nursing, Physical Therapy
Primary Language: English
Smoking Status: Former Smoker
ETOH Use: denies use
Illicit Drug Use: denies illicit drug use
 
Functional Ability
ADLs
Independent: dressing, eating, toileting, bathing. 
Ambulation: cane
IADLs
Independent: shopping, housework, finances, food prep, telephone, transportation
, medication admin. 
 
Exam & Diagnostic Data
Last 24 Hrs of Vital Signs/I&O
 Vital Signs
 
 
Date Time Temp Pulse Resp B/P B/P Pulse O2 O2 Flow FiO2
 
     Mean Ox Delivery Rate 
 
07/09 1130       Nasal 65% 
 
       Cannula  
 
07/09 1010  104  160/88     
 
07/09 1008  104  160/88     
 
07/09 0800      98 Nasal 65% 
 
       Cannula  
 
07/09 0733  97  140/60     
 
07/09 0647 98.7 97 22 144/80  93 Nasal  
 
       Cannula  
 
07/09 0121  94  144/70     
 
07/09 0030      96 Nasal 65% 
 
       Cannula  
 
07/08 2236      92 Nasal 6.0L 
 
       Cannula  
 
07/08 2215 98.2 98 24 134/66  92 Nasal 6.0L 
 
       Cannula  
 
07/08 2119 98.5 92 26 164/76  93 Nasal 6.0L 
 
       Cannula  
 
07/08 1814 99.0 84 28 165/70  96 Nasal 6.0L 
 
       Cannula  
 
07/08 1730       Nasal 6.0L 
 
       Cannula  
 
07/08 1715 97.9 84 36 154/64  92 Nasal 6.0L 
 
       Cannula  
 
 
 Intake & Output
 
 
 07/09 1600 07/09 0800 07/09 0000
 
Intake Total   0
 
Output Total  300 0
 
Balance  -300 0
 
    
 
Intake, Oral   0
 
Output, Urine  300 0
 
Patient   148 lb
 
Weight   
 
Weight   Bed scale
 
Measurement   
 
Method   
 
 
 
 
Physical Exam
General Appearance: alert, awake, anxious, comfortable
Head: atraumatic, normal appearance
Neck: supple
Respiratory: quiet respiration, decreased breath sounds, wheezing
Cardiovascular: S1 and S2 heard, diminished air movement
Gastrointestinal: normal bowel sounds, soft, non-tender
Extremities: no edema
Skin: intact, normal color, warm/dry
Last 48 Hrs of Labs/Stephon:
 Laboratory Tests
 
07/09/18 0710:
Anion Gap 13, Estimated GFR 32  L, BUN/Creatinine Ratio 20.0, Troponin I 0.04, 
CBC w Diff MAN DIFF ORDERED, RBC 2.90  L, MCV 80.8  L, MCH 24.9  L, MCHC 30.8  L
, RDW 21.1  H, MPV 8.8, Segmented Neutrophils 97  H, Lymphocytes 2  L, Monocytes
1  L, Platelet Estimate INCREASED, Hypochromic-Microcytic 1+, Anisocytosis 1+, 
Macrocytic Cells FEW
 
07/09/18 0105:
Anion Gap 16, Estimated GFR 28  L, BUN/Creatinine Ratio 19.4, Troponin I 0.05
 
07/09/18 0105:
Sodium Cancelled, Potassium Cancelled, Chloride Cancelled, Carbon Dioxide 
Cancelled, Anion Gap Cancelled, BUN Cancelled, Creatinine Cancelled, BUN/
Creatinine Ratio Cancelled
 
07/08/18 1823:
Anion Gap 12, Estimated GFR 23  L, BUN/Creatinine Ratio 18.1, Glucose 124  H, 
Calcium 8.9, Magnesium 1.9, Total Bilirubin 0.5, AST 26, ALT 23, Alkaline 
Phosphatase 147  H, Troponin I 0.06, Total Protein 6.4, Albumin 3.6, Globulin 
2.8, Albumin/Globulin Ratio 1.3, CBC w Diff MAN DIFF ORDERED, RBC 3.14  L, MCV 
81.1, MCH 25.4  L, MCHC 31.3  L, RDW 20.8  H, MPV 8.5, Segmented Neutrophils 78 
H, Lymphocytes 19  L, Monocytes 3, Nucleated RBCs 1  H, Platelet Estimate 
INCREASED, Polychromasia 1+, Hypochromic-Microcytic 1+, Anisocytosis 1+, 
Macrocytic Cells 1+
 
 
Diagnostic Data
CXR Results
- Chronic obstructive pulmonary disease.
- Diffuse, nonspecific interstitial opacity in both lungs has decreased/improved
compared to 06/12/2018.
- Stable cardiomegaly.
 
Assessment/Plan
Impression/Plan:
1.  Acute exacerbation of COPD.
2.  Hypoxemic respiratory failure, rule out underlying infectious cause.  The 
patient has active lung cancer and is at risk for venous normal embolic disease 
as well.
3.  Previous history of lung cancer status post gamma knife radiation.
4.  Paroxysmal atrial fibrillation, not on anticoagulation secondary to GI bleed
and anemia.
5.  Chronic kidney disease.
6.  History of hypertension.
7.  Anemia without evidence of active bleeding.
Recommendations:
* Check lower extremity Dopplers to rule out DVT.
* Check a noncontrast CT scan of the chest for further evaluation of the know 
malignancy and for bilateral interstitial opacity seen on chest x-ray.
* If the patient's hypoxia persists, would consider a ventilation/perfusion scan
noting she has a history of lung cancer and is at increased risk for VT E.
* Continue IV Solu-Medrol at current dose.
* Continue neb/total respiratory care.
* Attempt to wean oxygen down to for saturations greater than 90-92%.
* Complete 5 days of azithromycin.
* Attempt to obtain sputum culture.
* Maintain negative fluid balance.  Cardiology recommendations reviewed.
* Management of medical comorbidities as per primary team.
* Thank you for the consult and for allowing me to participate in the care of 
this patient.  I will follow her along with you provide further recommendations 
as necessary.  As always, if you have any comments or questions please do not 
hesitate to contact me at any time.
 
 
Consult Acknowledgment
- Thank you for your consult request.

## 2018-07-09 NOTE — PN- HOUSESTAFF
Wendy Ballard 18 0632:
Subjective
Follow-up For:
copd exacerbation
Complaints: no complaints
Tele-Events Since Last Visit:
NSR 82-94
Subjective:
She was anxious this morning 
 
Review of Systems
Constitutional:
Reports: see HPI. 
 
Objective
Last 24 Hrs of Vital Signs/I&O
 Vital Signs
 
 
Date Time Temp Pulse Resp B/P B/P Pulse O2 O2 Flow FiO2
 
     Mean Ox Delivery Rate 
 
 2228  100  138/70     
 
 2200      95 Nasal 65% 
 
       Cannula  
 
 2000      94 Nasal 60% 
 
       Cannula  
 
 1426 97.9 97 26 98/52  92 Nasal 65% 
 
       Cannula  
 
 1130       Nasal 65% 
 
       Cannula  
 
 1010  104  160/88     
 
 1008  104  160/88     
 
 0820      93 Nasal 65% 
 
       Cannula  
 
 0800      98 Nasal 65% 
 
       Cannula  
 
 0733  97  140/60     
 
 0647 98.7 97 22 144/80  93 Nasal  
 
       Cannula  
 
 0121  94  144/70     
 
 0030      96 Nasal 65% 
 
       Cannula  
 
 
 Intake & Output
 
 
  1600  0800  0000
 
Intake Total 800  0
 
Output Total 350 300 0
 
Balance 450 -300 0
 
    
 
Intake,   
 
Intake, Oral 500  0
 
Output, Urine 350 300 0
 
Patient   148 lb
 
Weight   
 
Weight   Bed scale
 
Measurement   
 
Method   
 
 
 
 
Physical Exam
General Appearance: Alert, Oriented X3, Cooperative, No Acute Distress
Skin: No Rashes, No Breakdown, No Significant Lesion
Skin Temp/Moisture Exam: Cool/Dry
Sepsis Skin Exam (color): Normal for Ethnicity
HEENT: Atraumatic
Neck: Supple
Cardiovascular: Regular Rate, Normal S1, Normal S2
Lungs: Clear to Auscultation (MILD B/L BASAL CRACKLES), Normal Air Movement
Abdomen: Normal Bowel Sounds, Soft, No Tenderness
Neurological: Normal Speech
Extremities: No Clubbing, No Cyanosis, No Edema
 
Assessment/Plan
Assessment:
The patient is a 68-year-old woman with a past medical history of coronary 
artery disease (PCI in 2001 in 2014, as well as drug-eluting stents to the RCA 
in 2016) COPD, social lung disease, lung CA status post gamma knife radiation, 
paroxysmal atrial fibrillation (not on anticoagulation secondary to GI bleeds 
and anemia), chronic kidney disease and hypertension.  She presents to our 
hospital with symptoms of increasing severe dyspnea and weakness likely to COPD 
exacerbation. 
Vitals: 98.7, Pulse 97, RR 22, bp 144/80, 93%sat on 65% high flow o2
Labs: 
WBC 14.2, hgb 7.4, hct 23.8, plt 461. Na 143, K 4.6, Cl 110, Bicarb 20, BUN 32, 
Cr 1.6. 
ProBNP: 7830
CT Chest WO COntrast:
Diffuse pulmonary emphysema with chronic interstitial pneumonitis, likely UIP
pattern. The density of the fibrotic interstitial changes increased from the
recent CT from 06/10/2018 which could be due to atelectasis, interstitial
edema, pneumonitis, or drug related phenomenon. Cardiomegaly. Unchanged 2.4 cm 
squamous cell carcinoma in the lingula. Mild mediastinal adenopathy is unchanged
as well. 
USG Doppler B/L L/L:No evidence of deep venous thrombosis involving the 
bilateral lower extremities. 
Problems: 
-Acute exacerbation of COPD
-Hypoxemic respiratory failure
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
-pAfib: not on anticoagulation secondary to GI bleed and anemia
-HTN
-Low Hb
-CKD
 
Plan:
-lower extremity Dopplers to rule out DVT
-noncontrast CT scan of the chest 
-ventilation/perfusion scan: if breathing does not improve
_sputum cx
-Continue IV Solu-Medrol at current dose.
-Continue neb/trc
-try to wean down O2 fro sat >90-92%
-Complete 5 days of azithromycin.
-Maintain negative fluid balance
-F/u with nephrology regarding High K
Problem List:
 1. Acute on CKD
 
 2. Full code status
 
 3. Anxiety
 
 4. COPD (chronic obstructive pulmonary disease)
 
 5. Hyperkalemia
 
 6. Interstitial lung disease
 
 7. Dyspnea
 
Pain Ratin
Pain Location:
none
Pain Goal: Remain pain free
Pain Plan:
n/a
Tomorrow's Labs & Rationales:
bep, cbc
 
 
Blu NEWBERRY,Chauncey 18 1150:
Attending MD Review Statement
 
Attending Statement
Attending MD Statement: examined this patient, discuss w/resident/PA/NP, agreed 
w/resident/PA/NP, reviewed EMR data (avail), discussed with nursing, discussed 
with case mgmt, amended to note
Attending Assessment/Plan:
Patient seen and examined.  Lying comfortably in bed.  She does not appear to be
in acute respiratory distress.-Nasal cannula is in place.  She denies chest 
pain.  Does admit to shortness of breath with exertion.  On examination she has 
no jugular venous distention.  Heart sounds are regular.  She has adequate entry
bilaterally with no wheezing or rhonchi.  She does have mild by basilar 
crepitus.  She has no peripheral edema.
 
Her hyperkalemia has resolved.  Apparently this is recurrent for her.  Her 
hemoglobin level is also trending downwards.  She has had similar episode during
the last admission requiring transfusion of blood.  Stool guaiac was negative 
during that admission.
 
Recommendations:
-Continue bronchodilator therapy.  Continue systemic steroid therapy.  Decrease 
dose of Solu-Medrol to every 12 hours today.
-Repeat CBCs this evening.  In view of her coronary artery disease, if 
persistently below eight transfuse one PRBC.  Check stool guaiac.  Anemia is 
likely secondary to chronic disease, chronic kidney disease.  She will continue 
with her erythropoietin therapy upon discharge.
-Check BNP level.  Follow-up with pulmonary regarding need for more aggressive 
diuresis.  She does not appear volume overloaded clinically.  Chest x-ray does 
not show pulmonary congestion.  Her bibasilar crepitus may be secondary to the 
interstitial opacities noted on imaging.
-Her recurrent hyperkalemia may be secondary to her chronic kidney disease.  
Follow-up with the patient's nephrologist

## 2018-07-09 NOTE — ULTRASOUND REPORT
EXAMINATION:
US TRIPLEX OF LOWER EXTREMITIES, BILATERAL
 
CLINICAL INFORMATION:
Bilateral lower extremity swelling.
 
COMPARISON:
06/11/2018
 
TECHNIQUE:
Color-flow triplex imaging with spectral analysis and compression Doppler
were performed on the lower extremities.
 
FINDINGS:
Respiratory variation, normal compression and augmented flow are noted
throughout the lower extremities. The visualized common femoral vein, greater
saphenous, superficial femoral vein, profunda femoral vein, popliteal vein
and midcalf peroneal and posterior tibial venous segments show no evidence of
deep venous thrombosis.
 
There is no Baker's cyst.
 
IMPRESSION:
No evidence of deep venous thrombosis involving the bilateral lower
extremities.

## 2018-07-10 VITALS — SYSTOLIC BLOOD PRESSURE: 146 MMHG | DIASTOLIC BLOOD PRESSURE: 78 MMHG

## 2018-07-10 VITALS — SYSTOLIC BLOOD PRESSURE: 120 MMHG | DIASTOLIC BLOOD PRESSURE: 68 MMHG

## 2018-07-10 VITALS — SYSTOLIC BLOOD PRESSURE: 136 MMHG | DIASTOLIC BLOOD PRESSURE: 60 MMHG

## 2018-07-10 LAB
ABSOLUTE GRANULOCYTE CT: 14.2 /CUMM (ref 1.4–6.5)
BASOPHILS # BLD: 0 /CUMM (ref 0–0.2)
BASOPHILS NFR BLD: 0 % (ref 0–2)
EOSINOPHIL # BLD: 0 /CUMM (ref 0–0.7)
EOSINOPHIL NFR BLD: 0.1 % (ref 0–5)
ERYTHROCYTE [DISTWIDTH] IN BLOOD BY AUTOMATED COUNT: 19.9 % (ref 11.5–14.5)
GRANULOCYTES NFR BLD: 95.9 % (ref 42.2–75.2)
HCT VFR BLD CALC: 25.9 % (ref 37–47)
LYMPHOCYTES # BLD: 0.3 /CUMM (ref 1.2–3.4)
MCH RBC QN AUTO: 26.5 PG (ref 27–31)
MCHC RBC AUTO-ENTMCNC: 32 G/DL (ref 33–37)
MCV RBC AUTO: 82.8 FL (ref 81–99)
MONOCYTES # BLD: 0.3 /CUMM (ref 0.1–0.6)
PLATELET # BLD: 377 /CUMM (ref 130–400)
PMV BLD AUTO: 8.7 FL (ref 7.4–10.4)
RED BLOOD CELL CT: 3.13 /CUMM (ref 4.2–5.4)
WBC # BLD AUTO: 14.8 /CUMM (ref 4.8–10.8)

## 2018-07-10 NOTE — NUCLEAR MEDICINE REPORT
EXAMINATION:
PULMONARY VENTILATION PERFUSION STUDY
 
CLINICAL INFORMATION:
Dyspnea hypoxemic, respiratory failure.
 
COMPARISON:
The previous study dated 05/16/2016 is available for comparison. CT scan of
the chest dated 07/09/2018 is available for comparison. A radiograph of the
chest dated 07/08/2018 is also available for comparison.
 
TECHNIQUE:
Serial gamma scintillation camera images were obtained over the posterior
chest during the single breath, equilibrium rebreathing and washout of 9.2
mCi Xe 133 gas. The patient then received 4.3 mCi Tc-99m MAA intravenously
and a 6-view perfusion study was performed.
 
FINDINGS:
Ventilation images: On the single breath and equilibrium images there is
homogeneous distribution of gas bilaterally. During the washout phase there
is no abnormal retention.
 
Perfusion images: No segmental perfusion defects are present. There is mild
heterogeneity present bilaterally but no focal or anatomic appearing
perfusion defects are present. There is prominence of the interlobar fissures
bilaterally.
 
The CT scan dated 07/09/2018 shows extensive chronic interstitial pneumonitis.
 
Compared to the prior lung scan dated 05/16/2016 there is more heterogeneity
on the current study and more prominence of the interlobar fissures. There
has also been significant progression of the interstitial lung disease on the
CT scan when compared to the CT scan performed shortly after the prior lung
scan, the CT of the chest dated 06/23/2016.
 
IMPRESSION:
Very low probability of pulmonary embolism.

## 2018-07-10 NOTE — PN- HOUSESTAFF
Wendy Ballard 07/10/18 0633:
Subjective
Follow-up For:
Exacerbation of COPD, hypoxic respiratory failure, A. fib, hypertension, anemia,
CKD
Complaints: no complaints
Tele-Events Since Last Visit:
Sinus rhythm heart rate 78-98
Subjective:
No complaints/acute events overnight.  This morning the oxygen saturation fell 
down to
 
Review of Systems
Constitutional:
Reports: see HPI. 
 
Objective
Last 24 Hrs of Vital Signs/I&O
 Vital Signs
 
 
Date Time Temp Pulse Resp B/P B/P Pulse O2 O2 Flow FiO2
 
     Mean Ox Delivery Rate 
 
07/10 0814  84  146/78     
 
07/10 0813  84  146/78     
 
07/10 0800       Nasal 65% 
 
       Cannula  
 
07/10 0706 97.6 84 22 146/78  95   
 
07/10 0053      96 Nasal 65% 
 
       Cannula  
 
 2335 97.8 98 24 138/70  96   
 
 2228  100  138/70     
 
 2200      95 Nasal 65% 
 
       Cannula  
 
 2000      94 Nasal 60% 
 
       Cannula  
 
 1426 97.9 97 26 98/52  92 Nasal 65% 
 
       Cannula  
 
 1130       Nasal 65% 
 
       Cannula  
 
 
 Intake & Output
 
 
 07/10 1600 07/10 0800 07/10 0000
 
Intake Total  150 200
 
Output Total  300 250
 
Balance  -150 -50
 
    
 
Intake, IV  150 200
 
Output, Urine  300 250
 
Patient   149 lb
 
Weight   
 
 
 
 
Physical Exam
General Appearance: Alert, Oriented X3, Cooperative, No Acute Distress
Skin: No Rashes, No Breakdown, No Significant Lesion
Skin Temp/Moisture Exam: Cool/Dry
HEENT: Atraumatic
Neck: Supple
Cardiovascular: Regular Rate, Normal S1, Normal S2, No Murmurs
Lungs: Normal Air Movement
Abdomen: Normal Bowel Sounds, Soft, No Tenderness, No Hepatospenomegaly
Neurological: Normal Speech, Strength at 5/5 X4 Ext
Extremities: No Clubbing, No Cyanosis, No Edema
 
Assessment/Plan
Assessment:
The patient is a 68-year-old woman with a past medical history of coronary 
artery disease (PCI in 2001 in 2014, as well as drug-eluting stents to the RCA 
in 2016) COPD, social lung disease, lung CA status post gamma knife radiation, 
paroxysmal atrial fibrillation (not on anticoagulation secondary to GI bleeds 
and anemia), chronic kidney disease and hypertension.  She presented with 
increasing severe dyspnea and weakness likely to COPD exacerbation & ILD. 
 
Vitals: Temperature 97.6, pulse 84, respiratory rate 22, 146/78, pulse ox 95% on
65% high flow oxygen.
Labs: ABG 14.8, RBC 3.13, hemoglobin 8.3, hematocrit 25.9, platelets 377, sodium
144, potassium 4.9, chloride 110, bicarb 19, BUN 45, creatinine 1.6, EGFR is 32
Problem list:
-Acute exacerbation of COPD
-Hypoxemic respiratory failure
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
-pAfib: not on anticoagulation secondary to GI bleed and anemia
-HTN
-Low Hb
-CKD
Assessment and plan:
- She is status post 1 blood transfusion(PRBCs).
- Check her H&H and if less than 8 we will retransfuse.
- Oxygen went low this morning at 8 AM to 67% when she got up from the bed to 
use the bedside commode.  Dr. Recinos saw the patient this morning. 
-trc, nebs, wean o2 
-D-dimers, VQ scan, Sputum gram stain and culture 
-We will continue Solu-Medrol on current regimen of 40 mg every 12 by IV
-Continue bronchodilation treatment at current regimen
-Continue diuretic at current regimen
-Transitioned to azithromycin per oral and completed 5 days of treatment
- Amiodarone induced lung injury is a concern at this time.  So we will consult 
pulmonology for that.
-Monitor H&H daily
- Intake and output: It was -150 today.  100 yesterday.  Monitor intake and 
output and maintain negative fluid balance as per cardiology
- Nephrology consult today
-Oncology and pulmonology consults: Goals of care discussion should be done.  
- Follow oncology as an outpatient
 
 
 
 
Problem List:
 1. COPD (chronic obstructive pulmonary disease)
 
 2. Interstitial lung disease
 
 3. Chronic disease anemia
 
 4. CKD (chronic kidney disease)
 
Pain Ratin
Pain Location:
None
Pain Goal: Remain pain free
Pain Plan:
n/a
Tomorrow's Labs & Rationales:
cbc, bep
 
 
Blu NEWBERRY,Chauncey 07/10/18 1111:
Attending MD Review Statement
 
Attending Statement
Attending MD Statement: examined this patient, discuss w/resident/PA/NP, agreed 
w/resident/PA/NP, reviewed EMR data (avail), discussed with nursing, discussed 
with case mgmt, amended to note
Attending Assessment/Plan:
Patient seen and examined.  He is lying in bed and appears comfortable and not 
in acute distress.  She continues to require high flow oxygen supplementation.  
There were no events overnight however this morning nursing staff reports that 
after she was moved to the chair she desaturated down into the high 60s.  She 
complains of shortness of breath at rest or with exertion.  She complains of a 
mild cough.  She is afebrile.  Blood pressure is stable.  She did receive a unit
of blood yesterday.  On examination she does not appear to be in acute 
respiratory distress.  She does not appear volume overloaded.
 
Problems:
1.  Acute on chronic hypoxic respiratory failure
2.  COPD
3.  Newly diagnosed lung cancer patient has prior history of lung cancer status 
post. (Gamma knife radiation in the past)
4.  Interstitial lung disease
5.  Paroxysmal atrial fibrillation not on anticoagulant due to history of GI 
bleeding and severe anemia.
6.  Acute on chronic anemia
7.  Chronic kidney disease stage III plan:
 
Plan:
-Multiple pulmonary pathologies will explain her underlying chronic hypoxic 
respiratory failure.  Contributing her acute on chronic anemia is likely to the 
exacerbation of disease.  Other etiologies has to be considered particularly in 
light of her continued need for high flow oxygen supplementation and significant
desaturation despite high flow oxygen supplementation today.  She does not 
appear volume overloaded in fact her elevated BUN may suggest some prerenal 
azotemia.  Pulmonary embolism should be considered as a differential.  D-dimer 
has been ordered.  a VQ scan has been ordered although per the radiology service
results may be indeterminate due to the use of high flow oxygen.  If the d-dimer
is not significantly elevated pulmonary embolism would be less likely unless the
VQ scan shows high probability.
-We will follow-up with the pulmonology service about the possibility of 
amiodarone induced lung toxicity the absence of any other obvious cause of her 
acute decompensation.
-Continue bronchodilator therapy.  Continue steroid therapy at current dose.  
Transition to oral azithromycin and complete 5 days of therapy.
-Obtain sputum cultures if possible.
Continue current-diuretic regimen.  would not recommend more aggressive diuresis
at this point.
-Hemoglobin level has improved following transfusion.  Monitor hemoglobin levels
daily.  Leukocytosis likely secondary to steroid therapy.
-Her Recurrent hyperkalemia is likely secondary to her chronic kidney disease.  
Potassium level is currently within normal limits.
-In view of her advanced multiple comorbidities recommend goals of care 
discussion by the patient's pulmonology service.
-Recommend oncology consultation to help guide the patient in decision-making 
regarding goals of care.

## 2018-07-10 NOTE — PN- CARDIOLOGY
Subjective
Subjective:
The patient is awake, alert, feels improved
Overall fluid balance appears euvolemic overnight
The events of the last 24 hours as well as telemetry were reviewed.
 
Review of Systems:
The review of systems is negative for chest pains, palpitations nor 
lightheadedness.
The remainder of the 14 point review of systems is noncontributory with the 
exception of above.
 
Objective
Vital Signs and I&Os
Vital Signs
 
 
Date Time Temp Pulse Resp B/P B/P Pulse O2 O2 Flow FiO2
 
     Mean Ox Delivery Rate 
 
07/10 0814  84  146/78     
 
07/10 0813  84  146/78     
 
07/10 0706 97.6 84 22 146/78  95   
 
07/10 0053      96 Nasal 65% 
 
       Cannula  
 
07/09 2335 97.8 98 24 138/70  96   
 
07/09 2228  100  138/70     
 
07/09 2200      95 Nasal 65% 
 
       Cannula  
 
07/09 2000      94 Nasal 60% 
 
       Cannula  
 
07/09 1426 97.9 97 26 98/52  92 Nasal 65% 
 
       Cannula  
 
07/09 1130       Nasal 65% 
 
       Cannula  
 
07/09 1010  104  160/88     
 
07/09 1008  104  160/88     
 
 
 Intake & Output
 
 
 07/10 1600 07/10 0800 07/10 0000 07/09 1600 07/09 0800 07/09 0000
 
Intake Total  150 200 800  0
 
Output Total  300 250 350 300 0
 
Balance  -150 -50 450 -300 0
 
       
 
Intake, IV  150 200 300  
 
Intake, Oral    500  0
 
Output, Urine  300 250 350 300 0
 
Patient   149 lb   148 lb
 
Weight      
 
Weight      Bed scale
 
Measurement      
 
Method      
 
 
 
Physical Exam:
General: Nontoxic, no apparent distress.
HEENT: Sclera and conjunctiva within normal limits, without xanthelasmas.
Neck: Carotids 2+ without bruits.
Respiratory: Scattered rhonchi, air movement is decreased throughout, without 
accessory respiratory muscle use.
Heart: Regular rate and rhythm, without murmurs, without JVD.
Abdomen: Soft, nontender, no masses, normoactive bowel sounds.
Extremities: Without clubbing, cyanosis, without edema.
Neuro: Nonfocal exam, strength, 5 out of 5
Skin: Within normal limits without lesions.
Psych: Mood and affect: Normal
Current Medications:
 Current Medications
 
 
  Sig/Enrique Start time  Last
 
Medication Dose Route Stop Time Status Admin
 
Acetaminophen 650 MG Q8P PRN 07/08 2100 AC 
 
  PO   
 
Albuterol Sulfate 3 ML BID 07/09 2100 AC 07/09
 
  INH   2000
 
Albuterol Sulfate 3 ML BID 07/09 0900 DC 
 
  INH   
 
Albuterol Sulfate 3 ML Q4P PRN 07/08 2100 DC 
 
  INH   
 
Alprazolam 0.25 MG ONCE ONE 07/09 1515 DC 07/09
 
  PO 07/09 1516  1612
 
Alprazolam 0.5 MG BID 07/08 2100 AC 07/09
 
  PO 07/15 2059  2221
 
Amiodarone HCl 100 MG Q48 07/09 0900 AC 07/09
 
  PO   1008
 
Aspirin 81 MG DAILY 07/09 0900 AC 07/10
 
  PO   0815
 
Atorvastatin Calcium 40 MG 1700 07/09 1700 AC 07/09
 
  PO   1621
 
Azithromycin 250 MG DAILY 07/11 0900 AC 
 
  PO   
 
Azithromycin 500 MG DAILY 07/09 0900 DC 07/10
 
Sodium Chloride 250 ML IV   0808
 
Budesonide/ 2 PUF BID 07/09 1030 AC 07/10
 
Formoterol Fumarate  INH   0815
 
Dextrose/Sodium  1,000 ML Q20H 07/09 1745 DC 
 
Chloride  IV 07/10 1344  
 
Epoetin Hermelindo 34,000 U ONCE ONE 07/09 2200 DC 07/09
 
  SC 07/09 2201  2341
 
Ezetimibe 10 MG DAILY 07/09 0900 AC 07/10
 
  PO   0814
 
Furosemide 20 MG Q48 07/09 0900 AC 07/09
 
  PO   1008
 
Heparin Sodium  5,000 UNIT Q8 07/08 2200 AC 07/10
 
(Porcine)  SC   0515
 
Levothyroxine Sodium 0.05 MG 0700 07/10 0700 AC 07/10
 
  PO   0515
 
Methylprednisolone 40 MG Q12 07/09 2100 AC 07/10
 
  IV   0809
 
Methylprednisolone 40 MG Q8 07/09 0600 DC 07/09
 
  IV   1500
 
Metoprolol Succinate 50 MG DAILY 07/09 0900 AC 07/10
 
  PO   0814
 
Mirtazapine 15 MG AT BEDTIME 07/09 0015 AC 07/09
 
  PO   2221
 
Non-Formulary  0 SEE ADMIN CRITERIA 07/09 0800 DC 
 
Medication  ANY   
 
Omeprazole 40 MG DAILY AC 07/10 0700 AC 07/10
 
  PO   0515
 
Patient Medication  1 ED ONE ONE 07/09 1230 DC 
 
Teaching  ED 07/09 1231  
 
Ranolazine 1,000 MG BID 07/09 0002 AC 07/10
 
  PO   0813
 
Senna/Docusate Sodium 1 TAB AT BEDTIME 07/08 2100 AC 07/09
 
  PO   2221
 
 
 
 
Results
Last 48 Hrs of Labs/Mics:
 Laboratory Tests
 
07/10/18 0625:
Anion Gap 15, Estimated GFR 32  L, BUN/Creatinine Ratio 28.1  H, CBC w Diff MAN 
DIFF ORDERED, RBC 3.13  L, MCV 82.8, MCH 26.5  L, MCHC 32.0  L, RDW 19.9  H, MPV
8.7, Gran % 95.9  H, Lymphocytes % 1.7  L, Monocytes % 2.3, Eosinophils % 0.1, 
Basophils % 0, Absolute Granulocytes 14.2  H, Absolute Lymphocytes 0.3  L, 
Absolute Monocytes 0.3, Absolute Eosinophils 0, Absolute Basophils 0, Platelet 
Estimate VERIFIED BY SMEAR, Polychromasia 1+, Anisocytosis 1+
 
07/09/18 1615:
CBC w Diff MAN DIFF ORDERED, RBC 2.94  L, MCV 80.9  L, MCH 25.1  L, MCHC 31.0  L
, RDW 21.1  H, MPV 9.4, Segmented Neutrophils 96  H, Lymphocytes 1  L, Monocytes
3, Platelet Estimate VERIFIED BY SMEAR, Polychromasia 1+, Anisocytosis 2+
 
07/09/18 0800:
Troponin I Cancelled
 
07/09/18 0710:
Anion Gap 13, Estimated GFR 32  L, BUN/Creatinine Ratio 20.0, Troponin I 0.04, 
Pro-B-Natriuretic Pept 7830  H, CBC w Diff MAN DIFF ORDERED, RBC 2.90  L, MCV 
80.8  L, MCH 24.9  L, MCHC 30.8  L, RDW 21.1  H, MPV 8.8, Segmented Neutrophils 
97  H, Lymphocytes 2  L, Monocytes 1  L, Platelet Estimate INCREASED, 
Hypochromic-Microcytic 1+, Anisocytosis 1+, Macrocytic Cells FEW
 
07/09/18 0105:
Anion Gap 16, Estimated GFR 28  L, BUN/Creatinine Ratio 19.4, Troponin I 0.05
 
07/09/18 0105:
Sodium Cancelled, Potassium Cancelled, Chloride Cancelled, Carbon Dioxide 
Cancelled, Anion Gap Cancelled, BUN Cancelled, Creatinine Cancelled, BUN/
Creatinine Ratio Cancelled
 
07/08/18 1823:
Anion Gap 12, Estimated GFR 23  L, BUN/Creatinine Ratio 18.1, Glucose 124  H, 
Calcium 8.9, Magnesium 1.9, Total Bilirubin 0.5, AST 26, ALT 23, Alkaline 
Phosphatase 147  H, Troponin I 0.06, Total Protein 6.4, Albumin 3.6, Globulin 
2.8, Albumin/Globulin Ratio 1.3, CBC w Diff MAN DIFF ORDERED, RBC 3.14  L, MCV 
81.1, MCH 25.4  L, MCHC 31.3  L, RDW 20.8  H, MPV 8.5, Segmented Neutrophils 78 
H, Lymphocytes 19  L, Monocytes 3, Nucleated RBCs 1  H, Platelet Estimate 
INCREASED, Polychromasia 1+, Hypochromic-Microcytic 1+, Anisocytosis 1+, 
Macrocytic Cells 1+
 
 
Assessment/Plan
Assessment/Plan
68-year-old woman with a past medical history of coronary artery disease (PCI in
2001 in 2014, as well as drug-eluting stents to the RCA in 2016) COPD, social 
lung disease, lung CA status post gamma knife radiation, paroxysmal atrial 
fibrillation (not on anticoagulation secondary to GI bleeds and anemia), chronic
kidney disease and hypertension.  She presents to our hospital with symptoms of 
increasing severe dyspnea and weakness, which has been refractory to her rescue 
asthma inhalers.  She has well has a noted approximate 10 pound weight gain over
the past several weeks.
 
Dyspnea:
The patient presents with dyspnea which is most consistent with a combined 
underlying COPD exacerbation and likely increasing fluid overload.  A recent 
echocardiogram performed demonstrated preserved LV systolic function, and a 
repeat of the same is currently not necessary.  
We will continue treatment aggressively as per pulmonary, and I would attempt 
mild diuresis if tolerated from a renal standpoint with increasing furosemide.
 
Anemia:
The patient has been receiving Procrit for her chronic anemia.  She feels 
overall improved following a transfusion
 
Atrial fibrillation:
Patient has known atrial fibrillation and is not anticoagulated secondary to a 
history of GI bleeds and anemia.  We will continue to maintain rate control.
 
Continue telemetry? No

## 2018-07-10 NOTE — PN- PULMONARY
Subjective
HPI/Critical Care Issues:
The patient is awake and alert.  She reports feeling slightly improved.  She was
reported today however to drop her oxygen saturations to 67% while on high flow 
oxygen during ambulation.  She notes less shortness of breath.  She has an 
ongoing cough with chest congestion.  She denies any fever or chills.  She 
remains on high flow oxygen at 65%.
 
Objective
Current Medications:
 Current Medications
 
 
  Sig/Enrique Start time  Last
 
Medication Dose Route Stop Time Status Admin
 
Acetaminophen 650 MG Q8P PRN 07/08 2100 AC 
 
  PO   
 
Albuterol Sulfate 3 ML BID 07/09 2100 AC 07/09
 
  INH   2000
 
Albuterol Sulfate 3 ML BID 07/09 0900 DC 
 
  INH   
 
Albuterol Sulfate 3 ML Q4P PRN 07/08 2100 DC 
 
  INH   
 
Alprazolam 0.25 MG ONCE ONE 07/09 1515 DC 07/09
 
  PO 07/09 1516  1612
 
Alprazolam 0.5 MG BID 07/08 2100 AC 07/09
 
  PO 07/15 2059  2221
 
Amiodarone HCl 100 MG Q48 07/09 0900 AC 07/09
 
  PO   1008
 
Aspirin 81 MG DAILY 07/09 0900 AC 07/10
 
  PO   0815
 
Atorvastatin Calcium 40 MG 1700 07/09 1700 AC 07/09
 
  PO   1621
 
Azithromycin 500 MG DAILY 07/09 0900 AC 07/10
 
Sodium Chloride 250 ML IV   0808
 
Budesonide/ 2 PUF BID 07/09 1030 AC 07/10
 
Formoterol Fumarate  INH   0815
 
Dextrose/Sodium  1,000 ML Q20H 07/09 1745 DC 
 
Chloride  IV 07/10 1344  
 
Epoetin Hermelindo 34,000 U ONCE ONE 07/09 2200 DC 07/09
 
  SC 07/09 2201  2341
 
Ezetimibe 10 MG DAILY 07/09 0900 AC 07/10
 
  PO   0814
 
Furosemide 20 MG Q48 07/09 0900 AC 07/09
 
  PO   1008
 
Heparin Sodium  5,000 UNIT Q8 07/08 2200 AC 07/10
 
(Porcine)  SC   0515
 
Levothyroxine Sodium 0.05 MG 0700 07/10 0700 AC 07/10
 
  PO   0515
 
Methylprednisolone 40 MG Q12 07/09 2100 AC 07/10
 
  IV   0809
 
Methylprednisolone 40 MG Q8 07/09 0600 DC 07/09
 
  IV   1500
 
Metoprolol Succinate 50 MG DAILY 07/09 0900 AC 07/10
 
  PO   0814
 
Mirtazapine 15 MG AT BEDTIME 07/09 0015 AC 07/09
 
  PO   2221
 
Non-Formulary  0 SEE ADMIN CRITERIA 07/09 0800 DC 
 
Medication  ANY   
 
Omeprazole 40 MG DAILY AC 07/10 0700 AC 07/10
 
  PO   0515
 
Patient Medication  1 ED ONE ONE 07/09 1230 DC 
 
Teaching  ED 07/09 1231  
 
Ranolazine 1,000 MG BID 07/09 0002 AC 07/10
 
  PO   0813
 
Senna/Docusate Sodium 1 TAB AT BEDTIME 07/08 2100 AC 07/09
 
  PO   2221
 
 
Physical Exam
General Appearance: alert, awake, anxious, comfortable
Head: atraumatic, normal appearance
Neck: supple
Respiratory: quiet respiration, decreased breath sounds, wheezing
Cardiovascular: S1 and S2 heard, diminished air movement
Gastrointestinal: normal bowel sounds, soft, non-tender
Extremities: no edema
Skin: intact, normal color, warm/dry
 
Vital Signs & I&O
Last 24 Hrs of Vitals and I&O:
 Vital Signs
 
 
Date Time Temp Pulse Resp B/P B/P Pulse O2 O2 Flow FiO2
 
     Mean Ox Delivery Rate 
 
07/10 0814  84  146/78     
 
07/10 0813  84  146/78     
 
07/10 0706 97.6 84 22 146/78  95   
 
07/10 0053      96 Nasal 65% 
 
       Cannula  
 
07/09 2335 97.8 98 24 138/70  96   
 
07/09 2228  100  138/70     
 
07/09 2200      95 Nasal 65% 
 
       Cannula  
 
07/09 2000      94 Nasal 60% 
 
       Cannula  
 
07/09 1426 97.9 97 26 98/52  92 Nasal 65% 
 
       Cannula  
 
07/09 1130       Nasal 65% 
 
       Cannula  
 
07/09 1010  104  160/88     
 
07/09 1008  104  160/88     
 
 
 Intake & Output
 
 
 07/10 1600 07/10 0800 07/10 0000
 
Intake Total  150 200
 
Output Total  300 250
 
Balance  -150 -50
 
    
 
Intake, IV  150 200
 
Output, Urine  300 250
 
Patient   149 lb
 
Weight   
 
 
Physical Exam
General Appearance: alert, awake, anxious, comfortable
Head: atraumatic, normal appearance
Neck: supple
Respiratory: quiet respiration, decreased breath sounds, wheezing
Cardiovascular: S1 and S2 heard, diminished air movement
Gastrointestinal: normal bowel sounds, soft, non-tender
Extremities: no edema
Skin: intact, normal color, warm/dry
 
Results
Last 24 Hrs of Lab Results:
 Laboratory Tests
 
07/10/18 0625:
Anion Gap 15, Estimated GFR 32  L, BUN/Creatinine Ratio 28.1  H, CBC w Diff 
Pending, WBC Pending, RBC Pending, Hgb Pending, Hct Pending, MCV Pending, MCH 
Pending, MCHC Pending, RDW Pending, Plt Count Pending, MPV Pending, Gran % 
Pending, Lymphocytes % Pending, Monocytes % Pending, Eosinophils % Pending, 
Basophils % Pending, Absolute Granulocytes Pending, Absolute Lymphocytes Pending
, Absolute Monocytes Pending, Absolute Eosinophils Pending, Absolute Basophils 
Pending
 
07/09/18 1615:
CBC w Diff MAN DIFF ORDERED, RBC 2.94  L, MCV 80.9  L, MCH 25.1  L, MCHC 31.0  L
, RDW 21.1  H, MPV 9.4, Segmented Neutrophils 96  H, Lymphocytes 1  L, Monocytes
3, Platelet Estimate VERIFIED BY SMEAR, Polychromasia 1+, Anisocytosis 2+
 
Last 24 Hrs of Micro Results:
Sputum not sent.
 
 
Diagnostic Data
CT Scan Findings:
Diffuse pulmonary emphysema with chronic interstitial pneumonitis, likely UIP 
pattern. The density of the fibrotic interstitial changes increased from the 
recent CT from 06/10/2018 which could be due to atelectasis, interstitial edema,
pneumonitis, or drug related phenomenon.
 
Cardiomegaly
 
Unchanged 2.4 cm squamous cell carcinoma in the lingula. Mild mediastinal
adenopathy is unchanged as well.
US Findings:
No evidence of deep venous thrombosis involving the bilateral lower extremities.
 
Impression/Plan
 
Impression/Plan
Impression/Plan:
1.  Acute exacerbation of COPD.  CT scan demonstrates very severe emphysema, and
interstitial pneumonitis which could explain progressive respiratory failure as 
well.  The CT scan pattern is not consistent with lymphangitic spread of tumor 
although she is noted to have some associated adenopathy.
2.  Hypoxemic respiratory failure, rule out PE.
3.  Newly diagnosed left lung cancer. 
4.  Paroxysmal atrial fibrillation, not on anticoagulation secondary to GI bleed
and anemia.
5.  Chronic kidney disease.
6.  History of hypertension.
7.  Anemia without evidence of active bleeding.
8.  Previous history of lung cancer status post right sided gamma knife 
radiation.
9.  Lingular moderate to poorly differentiated squamous cell carcinoma - not yet
treated.
Recommendations:
* Check a ventilation/perfusion scan today to rule out pulmonary embolism.
* Continue IV Solu-Medrol at current dose of q 12 hours.
* Continue neb/total respiratory care.
* Attempt to wean oxygen down to for saturations greater than 90-92%.
* Complete 5 days of azithromycin.
* Once again, attempt to obtain sputum culture.
* Maintain negative fluid balance per cardiology.
* Management of medical comorbidities as per primary team.
* Recommend oncology consult -the patient was supposed to see Dr. Verdin as an 
outpatient.

## 2018-07-11 VITALS — SYSTOLIC BLOOD PRESSURE: 126 MMHG | DIASTOLIC BLOOD PRESSURE: 62 MMHG

## 2018-07-11 VITALS — DIASTOLIC BLOOD PRESSURE: 70 MMHG | SYSTOLIC BLOOD PRESSURE: 132 MMHG

## 2018-07-11 VITALS — SYSTOLIC BLOOD PRESSURE: 116 MMHG | DIASTOLIC BLOOD PRESSURE: 50 MMHG

## 2018-07-11 LAB
ABSOLUTE GRANULOCYTE CT: 12.4 /CUMM (ref 1.4–6.5)
BASOPHILS # BLD: 0 /CUMM (ref 0–0.2)
BASOPHILS NFR BLD: 0.3 % (ref 0–2)
EOSINOPHIL # BLD: 0 /CUMM (ref 0–0.7)
EOSINOPHIL NFR BLD: 0.1 % (ref 0–5)
ERYTHROCYTE [DISTWIDTH] IN BLOOD BY AUTOMATED COUNT: 20.7 % (ref 11.5–14.5)
GRANULOCYTES NFR BLD: 89.1 % (ref 42.2–75.2)
HCT VFR BLD CALC: 26.8 % (ref 37–47)
LYMPHOCYTES # BLD: 0.7 /CUMM (ref 1.2–3.4)
MCH RBC QN AUTO: 26.4 PG (ref 27–31)
MCHC RBC AUTO-ENTMCNC: 31.5 G/DL (ref 33–37)
MCV RBC AUTO: 83.9 FL (ref 81–99)
MONOCYTES # BLD: 0.8 /CUMM (ref 0.1–0.6)
PLATELET # BLD: 338 /CUMM (ref 130–400)
PMV BLD AUTO: 9.4 FL (ref 7.4–10.4)
RED BLOOD CELL CT: 3.19 /CUMM (ref 4.2–5.4)
WBC # BLD AUTO: 13.9 /CUMM (ref 4.8–10.8)
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
## 2018-07-11 NOTE — PN- HOUSESTAFF
Wendy Ballard 18 0652:
Subjective
Follow-up For:
COPD exacerbation, hyperkalemia, CKD
Complaints: sob on ambulation
Tele-Events Since Last Visit:
Normal sinus rhythm 80- 88
Subjective:
Episodes of desaturation last night to 60% on ambulation.
 
Review of Systems
Constitutional:
Reports: see HPI. 
 
Objective
Last 24 Hrs of Vital Signs/I&O
 Vital Signs
 
 
Date Time Temp Pulse Resp B/P B/P Pulse O2 O2 Flow FiO2
 
     Mean Ox Delivery Rate 
 
 0835      90 Nasal 60% 
 
       Cannula  
 
 0821  89  136/70     
 
 0821  89  136/70     
 
 0820  89  136/70     
 
 0600 97.2 85 18 132/70  97   
 
 0030      89 Nasal 55% 
 
       Cannula  
 
07/10 2257 98.3 95 18 136/60  97 Nasal  
 
       Cannula  
 
07/10 2215      92 Nasal 55% 
 
       Cannula  
 
07/10 2022      90 Nasal 55% 
 
       Cannula  
 
07/10 2011  98  140/72     
 
07/10 1920      96 Nasal 60% 
 
       Cannula  
 
07/10 1616      94 Nasal 60% 
 
       Cannula  
 
07/10 1600       Nasal 60% 
 
       Cannula  
 
07/10 1514 97.6 85 22 120/68  90   
 
07/10 1343      91 Nasal 60% 
 
       Cannula  
 
07/10 1100      92 Nasal 60% 
 
       Cannula  
 
 
 Intake & Output
 
 
  1600  0800  0000
 
Intake Total  200 
 
Output Total  400 300
 
Balance  -200 -300
 
    
 
Intake, Oral  200 
 
Output, Urine  400 300
 
Patient   153 lb
 
Weight   
 
Weight   Bed scale
 
Measurement   
 
Method   
 
 
 
 
Physical Exam
General Appearance: Alert, Oriented X3, Cooperative, No Acute Distress
Skin: No Rashes, No Breakdown, No Significant Lesion
Skin Temp/Moisture Exam: Cool/Dry
HEENT: Atraumatic
Neck: Supple
Cardiovascular: Regular Rate, Normal S1, Normal S2, No Murmurs
Lungs: Clear to Auscultation, Normal Air Movement
Abdomen: Normal Bowel Sounds, Soft, No Tenderness, No Hepatospenomegaly
Neurological: Normal Speech, Strength at 5/5 X4 Ext
Extremities: No Clubbing, No Cyanosis, No Edema
 
Assessment/Plan
Assessment:
The patient is a 68-year-old woman with a past medical history of coronary 
artery disease (PCI in 2001 in 2014, as well as drug-eluting stents to the RCA 
in 2016) COPD, social lung disease, lung CA status post gamma knife radiation, 
paroxysmal atrial fibrillation (not on anticoagulation secondary to GI bleeds 
and anemia), chronic kidney disease and hypertension.  
 
She presented with increasing severe dyspnea and weakness likely to COPD 
exacerbation & ILD. 
 
Vitals: 97.2 temp, pulse 85, respiratory rate 18, blood pressure 132/70, 97% on 
55% oxygen.  She desaturated last night to 60% on ambulating.  This morning at 9
:25 AM she desaturated to 73-76% while trying for a bowel movement on a bedpan.
 
Labs: Sodium 145, potassium 5.2, BUN 46, creatinine 1.6
D-dimers done on July 10 came back 475
 
Problem list:
-Acute exacerbation of COPD
-Hypoxemic respiratory failure
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
-ILD
-pAfib: not on anticoagulation secondary to GI bleed and anemia
-Anemia
-HTN
-Low Hb
-CKD Stage III
Assessment and plan:
- Lasix 20 mg IV x 1 today. Start her on Lasix 20 mg OD from 18
-Hold Amiodarone for now because of possible lung toxicity
-Monitor H&H and if less than 8 we will retransfuse.
- Oxygen went low this morning at 8 AM to 67% when she got up from the bed to 
use the bedside commode.  Dr. Recinos saw the patient this morning. 
-We will continue Solu-Medrol on current regimen of 40 mg every 12 by IV
-Continue bronchodilation treatment at current regimen
-Transitioned to azithromycin per oral and completed 5 days of treatment
-Monitor H&H daily
-Oncology and pulmonology consults: Goals of care discussion should be done
-Add colace Senna to help with constipation
 
Problem List:
 1. CKD (chronic kidney disease)
 
 2. Interstitial lung disease
 
 3. Decompensated COPD with exacerbation (chronic obstructive pulmonary disease 
)
 
 4. Chronic disease anemia
 
 5. Hyperkalemia
 
Pain Ratin
Pain Location:
None
Pain Goal: Remain pain free
Pain Plan:
n/a
Tomorrow's Labs & Rationales:
bep, tarah Hurt MD,Chauncey 18 1111:
Attending MD Review Statement
 
Attending Statement
Attending MD Statement: examined this patient, discuss w/resident/PA/NP, agreed 
w/resident/PA/NP, reviewed EMR data (avail), discussed with nursing, discussed 
with case mgmt, amended to note
Attending Assessment/Plan:
Patient seen and examined.  No events reported by nursing staff overnight 
however this morning while straining to move her bowels she developed 
significant shortness of breath and desaturated significantly.  She did improve 
after rest.  She reports some abdominal cramping with straining.  Denies any 
nausea vomiting.  Denies any chest pain.  On examination abdomen is soft and 
nontender with normal bowel sounds. She has decreased breath sounds bilaterally 
with bibasilar crackles and mild wheezing.  Currently stable although her BUN is
trending upwards.
White cell count is elevated likely secondary to steroid therapy.  Hemoglobin 
level is stable.
 
 
Problems:
1.  Acute on chronic hypoxic respiratory failure
2.  COPD
3.  Newly diagnosed lung cancer patient has prior history of lung cancer status 
post. (Gamma knife radiation in the past)
4.  Interstitial lung disease
5.  Paroxysmal atrial fibrillation not on anticoagulant due to history of GI 
bleeding and severe anemia.
6.  Acute on chronic anemia
7.  Chronic kidney disease stage III plan:
1.  Constipation
 
Plan:
-Continue bronchodilator therapy.  Continue intravenous systemic steroid therapy
as recommended by the pulmonology service.
-Cardiology follow-up appreciated.  Will attempt to increase diuresis as 
recommended and see if patient tolerates and improves.  Change Lasix to 20 mg IV
x 1 today.  Will reevaluate her response before giving additional doses.  She is
currently receiving 20 mg every other day.  Monitor closely as she has a history
of chronic kidney disease and her BUN has trended upward since hospitalization. 
There is a possibility that she may have
-Underlying amiodarone lung toxicity in addition to her multiple other 
comorbidities contributed to her respiratory distress.  This was discussed with 
cardiology service.  Will hold amiodarone for now.  -Would recommend evaluation 
for ongoing care at the long-term acute care facility as she will likely have a 
prolonged course before seen any significant improvement of her symptoms.
-Potassium is mildly elevated.  Monitor closely.  No intervention from now.
-Patient patient is on adequate bowel regimen.  She is already on senna.  Add 
Colace and Dulcolax suppository to the regimen.
 
prolonged course before seen any significant improvement of her symptoms.
-Potassium is mildly elevated.  Monitor closely.  No intervention from now.
-Patient patient is on adequate bowel regimen.  She is already on senna.  Add 
Colace and Dulcolax suppository to the regimen.

## 2018-07-11 NOTE — PATIENT DISCHARGE INSTRUCTIONS
**See Addendum**
Discharge Instructions
 
General Discharge Information
You were seen/treated for:
PATIENT WAS TRANSFERRED TO THE ICU
 
COPD exacerbation
Watch for these problems:
If you have any of these: Shortness of breath, chest pain, heart racing fast, 
fatigue, weakness; please visit your nearest emergency room.
 
Diet
Continue normal diet: Yes
 
Activity
Activity Self Limited: Yes
 
Acute Coronary Syndrome
 
Inclusion Criteria
At DC or during hospital stay patient has or had the following:
ACS DIAGNOSIS No
 
Discharge Core Measures
Meds if any: Prescribed or Continued at Discharge
Meds if any: NOT Prescribed or Continued at Discharge
 
Congestive Heart Failure
 
Inclusion Criteria
At DC or during hospital stay patient has or had the following:
CHF DIAGNOSIS No
 
Discharge Core Measures
Meds if any: Prescribed or Continued at Discharge
Meds if any: NOT Prescribed or Continued at Discharge
 
Cerebrovascular accident
 
Inclusion Criteria
At DC or during hospital stay patient has or had the following:
CVA/TIA Diagnosis No
 
Discharge Core Measures
Meds if any: Prescribed or Continued at Discharge
Meds if any: NOT Prescribed or Continued at Discharge
 
Venous thromboembolism
 
Inclusion Criteria
VTE Diagnosis No
VTE Type NONE
VTE Confirmed by (Test) NONE
 
Discharge Core Measures
- Per Current guidelines, there needs to be overlap
- treatment for the first 5 days of Warfarin therapy.
- If discharged on Warfarin prior to 5 days of
- overlap therapy, the patient will need to be
- assessed for post discharge needs including
- *Post discharge parental anticoagulation
- *Warfarin and/or parental anticoagulation education
- *Follow up date to check INR post discharge
At least 5 days overlap therapy as Inpatient No
Meds if any: Prescribed or Continued at Discharge
Note: Overlap Therapy is Warfarin and Anticoagulant
Meds if any: NOT Prescribed or Continued at Discharge

## 2018-07-11 NOTE — PN- PULMONARY
Subjective
HPI/Critical Care Issues:
The patient is awake and alert.  She reports feeling improved since admission.  
She is having some difficulty ambulating noting that her saturations drop 
significantly despite oxygen therapy.  Her oxygen requirement overall however 
has improved and is now down to 55%.  She feels less short of breath.  She is 
not expectorating.  There were no overnight events and she remains afebrile.
 
Objective
Current Medications:
 Current Medications
 
 
  Sig/Enrique Start time  Last
 
Medication Dose Route Stop Time Status Admin
 
Acetaminophen 650 MG Q8P PRN 07/08 2100 AC 
 
  PO   
 
Albuterol Sulfate 3 ML BID 07/09 2100 AC 07/10
 
  INH   1920
 
Alprazolam 0.5 MG BID 07/08 2100 AC 07/10
 
  PO 07/15 2059  2141
 
Amiodarone HCl 100 MG Q48 07/09 0900 AC 07/09
 
  PO   1008
 
Aspirin 81 MG DAILY 07/09 0900 AC 07/10
 
  PO   0815
 
Atorvastatin Calcium 40 MG 1700 07/09 1700 AC 07/10
 
  PO   1622
 
Azithromycin 250 MG DAILY 07/11 0900 AC 
 
  PO   
 
Azithromycin 500 MG DAILY 07/09 0900 DC 07/10
 
Sodium Chloride 250 ML IV   0808
 
Budesonide/ 2 PUF BID 07/09 1030 AC 07/10
 
Formoterol Fumarate  INH   2015
 
Ezetimibe 10 MG DAILY 07/09 0900 AC 07/10
 
  PO   0814
 
Furosemide 20 MG Q48 07/09 0900 AC 07/09
 
  PO   1008
 
Heparin Sodium  5,000 UNIT Q8 07/08 2200 AC 07/11
 
(Porcine)  SC   0605
 
Levothyroxine Sodium 0.05 MG 0700 07/10 0700 AC 07/11
 
  PO   0604
 
Methylprednisolone 40 MG Q12 07/09 2100 AC 07/10
 
  IV   2004
 
Metoprolol Succinate 50 MG DAILY 07/09 0900 AC 07/10
 
  PO   0814
 
Mirtazapine 15 MG AT BEDTIME 07/09 0015 AC 07/10
 
  PO   2005
 
Omeprazole 40 MG DAILY AC 07/10 0700 AC 07/11
 
  PO   0604
 
Patient Medication  1 ED ONE ONE 07/10 1715 DC 07/10
 
Teaching  ED 07/10 1716  1718
 
Ranolazine 1,000 MG BID 07/09 0002 AC 07/10
 
  PO   2011
 
Senna/Docusate Sodium 1 TAB AT BEDTIME 07/08 2100 AC 07/10
 
  PO   2005
 
 
 
 
Vital Signs & I&O
Last 24 Hrs of Vitals and I&O:
 Vital Signs
 
 
Date Time Temp Pulse Resp B/P B/P Pulse O2 O2 Flow FiO2
 
     Mean Ox Delivery Rate 
 
07/11 0600 97.2 85 18 132/70  97   
 
07/11 0030      89 Nasal 55% 
 
       Cannula  
 
07/10 2257 98.3 95 18 136/60  97 Nasal  
 
       Cannula  
 
07/10 2215      92 Nasal 55% 
 
       Cannula  
 
07/10 2022      90 Nasal 55% 
 
       Cannula  
 
07/10 2011  98  140/72     
 
07/10 1920      96 Nasal 60% 
 
       Cannula  
 
07/10 1616      94 Nasal 60% 
 
       Cannula  
 
07/10 1600       Nasal 60% 
 
       Cannula  
 
07/10 1514 97.6 85 22 120/68  90   
 
07/10 1343      91 Nasal 60% 
 
       Cannula  
 
07/10 1100      92 Nasal 60% 
 
       Cannula  
 
07/10 0814  84  146/78     
 
07/10 0813  84  146/78     
 
07/10 0800       Nasal 65% 
 
       Cannula  
 
 
 Intake & Output
 
 
 07/11 0800 07/11 0000 07/10 1600
 
Intake Total   1190
 
Output Total  300 
 
Balance  -300 1190
 
    
 
Intake, IV   250
 
Intake, Oral   940
 
Output, Urine  300 
 
Patient  153 lb 
 
Weight   
 
Weight  Bed scale 
 
Measurement   
 
Method   
 
 
Physical Exam
General Appearance: alert, awake, anxious, comfortable
Head: atraumatic, normal appearance
Neck: supple
Respiratory: decreased breath sounds, wheezing, bibasilar fine crackles
Cardiovascular: S1 and S2 heard
Gastrointestinal: normal bowel sounds, soft, non-tender
Extremities: no edema
Skin: intact, normal color, warm/dry
 
Results
Last 24 Hrs of Lab Results:
 Laboratory Tests
 
07/11/18 0647:
Sodium Pending, Potassium Pending, Chloride Pending, Carbon Dioxide Pending, 
Anion Gap Pending, BUN Pending, Creatinine Pending, BUN/Creatinine Ratio Pending
, CBC w Diff Pending, WBC Pending, RBC Pending, Hgb Pending, Hct Pending, MCV 
Pending, MCH Pending, MCHC Pending, RDW Pending, Plt Count Pending, MPV Pending
 
07/10/18 1040:
D-Dimer High Sensitivty 475  H
 
 
Diagnostic Data
Radiology Findings:
VQ: Very low probability of pulmonary embolism.
 
 
Impression/Plan
 
Impression/Plan
Impression/Plan:
1.  Acute exacerbation of COPD.  CT scan demonstrates very severe emphysema, and
interstitial pneumonitis which could explain progressive respiratory failure as 
well.  
2.  Hypoxemic respiratory failure.  No evidence of pulmonary embolism on VQ scan
3.  Lingular moderate to poorly differentiated squamous cell carcinoma - not yet
treated.
4.  Paroxysmal atrial fibrillation, not on anticoagulation secondary to GI bleed
and anemia.
5.  Chronic kidney disease.
6.  History of hypertension.
7.  Anemia without evidence of active bleeding.
8.  Previous history of lung cancer status post right sided gamma knife 
radiation.
 
Recommendations:
* Consider [place] evaluation.
* Continue IV Solu-Medrol at current dose of q 12 hours.  Do not change to oral 
prednisone as of yet.
* Continue neb/total respiratory care.
* Wean oxygen down to for saturations greater than 90-92%.
* Follow-up in the morning lab results.
* Maintain negative fluid balance per cardiology.
* Management of medical comorbidities as per primary team.
* Oncology consult.
* DVT prophylaxis at all times.
* Continue all supportive care.
* Out of bed to chair if able.

## 2018-07-11 NOTE — PN- CARDIOLOGY
Subjective
Subjective:
The patient is awake, alert, feels overall improved
She has maintained an overall 200 cc net fluid output overnight
The events of the last 24 hours as well as telemetry were reviewed.
 
Review of Systems:
The review of systems is negative for chest pains, palpitations nor 
lightheadedness.
The remainder of the 14 point review of systems is noncontributory with the 
exception of above.
 
Objective
Vital Signs and I&Os
Vital Signs
 
 
Date Time Temp Pulse Resp B/P B/P Pulse O2 O2 Flow FiO2
 
     Mean Ox Delivery Rate 
 
07/11 0835      90 Nasal 60% 
 
       Cannula  
 
07/11 0821  89  136/70     
 
07/11 0821  89  136/70     
 
07/11 0820  89  136/70     
 
07/11 0600 97.2 85 18 132/70  97   
 
07/11 0030      89 Nasal 55% 
 
       Cannula  
 
07/10 2257 98.3 95 18 136/60  97 Nasal  
 
       Cannula  
 
07/10 2215      92 Nasal 55% 
 
       Cannula  
 
07/10 2022      90 Nasal 55% 
 
       Cannula  
 
07/10 2011  98  140/72     
 
07/10 1920      96 Nasal 60% 
 
       Cannula  
 
07/10 1616      94 Nasal 60% 
 
       Cannula  
 
07/10 1600       Nasal 60% 
 
       Cannula  
 
07/10 1514 97.6 85 22 120/68  90   
 
07/10 1343      91 Nasal 60% 
 
       Cannula  
 
07/10 1100      92 Nasal 60% 
 
       Cannula  
 
 
 Intake & Output
 
 
 07/11 1600 07/11 0800 07/11 0000 07/10 1600 07/10 0800 07/10 0000
 
Intake Total  200  1190 150 200
 
Output Total  400 300  300 250
 
Balance  -200 -300 1190 -150 -50
 
       
 
Intake, IV    250 150 200
 
Intake, Oral  200  940  
 
Output, Urine  400 300  300 250
 
Patient   153 lb   149 lb
 
Weight      
 
Weight   Bed scale   
 
Measurement      
 
Method      
 
 
 
Physical Exam:
General: Nontoxic, no apparent distress.
HEENT: Sclera and conjunctiva within normal limits, without xanthelasmas.
Neck: Carotids 2+ without bruits.
Respiratory: Scattered rhonchi and wheezing, air movement is decreased 
throughout, without accessory respiratory muscle use.
Heart: Regular rate and rhythm, without murmurs, without JVD.
Abdomen: Soft, nontender, no masses, normoactive bowel sounds.
Extremities: Without clubbing, cyanosis, without edema.
Neuro: Nonfocal exam, strength, 5 out of 5
Skin: Within normal limits without lesions.
Psych: Mood and affect: Normal
Current Medications:
 Current Medications
 
 
  Sig/Enrique Start time  Last
 
Medication Dose Route Stop Time Status Admin
 
Acetaminophen 650 MG Q8P PRN 07/08 2100 AC 
 
  PO   
 
Albuterol Sulfate 3 ML BID 07/09 2100 AC 07/11
 
  INH   0833
 
Alprazolam 0.5 MG BID 07/08 2100 AC 07/11
 
  PO 07/15 2059  1030
 
Amiodarone HCl 100 MG Q48 07/09 0900 AC 07/11
 
  PO   0820
 
Aspirin 81 MG DAILY 07/09 0900 AC 07/11
 
  PO   0819
 
Atorvastatin Calcium 40 MG 1700 07/09 1700 AC 07/10
 
  PO   1622
 
Azithromycin 250 MG DAILY 07/11 0900 AC 07/11
 
  PO   0821
 
Bisacodyl 5 MG DAILY 07/11 0930 AC 07/11
 
  PO   1031
 
Budesonide/ 2 PUF BID 07/09 1030 AC 07/11
 
Formoterol Fumarate  INH   0818
 
Docusate Sodium 100 MG BID 07/11 0930 AC 07/11
 
  PO   1030
 
Ezetimibe 10 MG DAILY 07/09 0900 AC 07/11
 
  PO   0821
 
Furosemide 20 MG Q48 07/09 0900 AC 07/11
 
  PO   0820
 
Heparin Sodium  5,000 UNIT Q8 07/08 2200 AC 07/11
 
(Porcine)  SC   0605
 
Levothyroxine Sodium 0.05 MG 0700 07/10 0700 AC 07/11
 
  PO   0604
 
Methylprednisolone 40 MG Q12 07/09 2100 AC 07/10
 
  IV   2004
 
Metoprolol Succinate 50 MG DAILY 07/09 0900 AC 07/11
 
  PO   0821
 
Mirtazapine 15 MG AT BEDTIME 07/09 0015 AC 07/10
 
  PO   2005
 
Omeprazole 40 MG DAILY AC 07/10 0700 AC 07/11
 
  PO   0604
 
Patient Medication  1 ED ONE ONE 07/10 1715 DC 07/10
 
Teaching  ED 07/10 1716  1718
 
Ranolazine 1,000 MG BID 07/09 0002 AC 07/11
 
  PO   0821
 
Senna/Docusate Sodium 1 TAB AT BEDTIME 07/08 2100 AC 07/10
 
  PO   2005
 
 
 
 
Results
Last 48 Hrs of Labs/Mics:
 Laboratory Tests
 
07/11/18 0647:
Anion Gap 12, Estimated GFR 32  L, BUN/Creatinine Ratio 28.8  H, CBC w Diff MAN 
DIFF ORDERED, RBC 3.19  L, MCV 83.9, MCH 26.4  L, MCHC 31.5  L, RDW 20.7  H, MPV
9.4, Gran % 89.1  H, Lymphocytes % 4.8  L, Monocytes % 5.7, Eosinophils % 0.1, 
Basophils % 0.3, Absolute Granulocytes 12.4  H, Segmented Neutrophils 87  H, 
Absolute Lymphocytes 0.7  L, Lymphocytes 7  L, Monocytes 6, Absolute Monocytes 
0.8  H, Absolute Eosinophils 0, Absolute Basophils 0, Nucleated RBCs 1  H, 
Platelet Estimate VERIFIED BY SMEAR, Polychromasia 1+, Anisocytosis 1+
 
07/10/18 1040:
D-Dimer High Sensitivty 475  H
 
07/10/18 0625:
Anion Gap 15, Estimated GFR 32  L, BUN/Creatinine Ratio 28.1  H, CBC w Diff MAN 
DIFF ORDERED, RBC 3.13  L, MCV 82.8, MCH 26.5  L, MCHC 32.0  L, RDW 19.9  H, MPV
8.7, Gran % 95.9  H, Lymphocytes % 1.7  L, Monocytes % 2.3, Eosinophils % 0.1, 
Basophils % 0, Absolute Granulocytes 14.2  H, Absolute Lymphocytes 0.3  L, 
Absolute Monocytes 0.3, Absolute Eosinophils 0, Absolute Basophils 0, Platelet 
Estimate VERIFIED BY SMEAR, Polychromasia 1+, Anisocytosis 1+
 
07/09/18 1615:
CBC w Diff MAN DIFF ORDERED, RBC 2.94  L, MCV 80.9  L, MCH 25.1  L, MCHC 31.0  L
, RDW 21.1  H, MPV 9.4, Segmented Neutrophils 96  H, Lymphocytes 1  L, Monocytes
3, Platelet Estimate VERIFIED BY SMEAR, Polychromasia 1+, Anisocytosis 2+
 
 
Assessment/Plan
Assessment/Plan
68-year-old woman with a past medical history of coronary artery disease (PCI in
2001 in 2014, as well as drug-eluting stents to the RCA in 2016) COPD, social 
lung disease, lung CA status post gamma knife radiation, paroxysmal atrial 
fibrillation (not on anticoagulation secondary to GI bleeds and anemia), chronic
kidney disease and hypertension.  She presents to our hospital with symptoms of 
increasing severe dyspnea and weakness, which has been refractory to her rescue 
asthma inhalers.  She has well has a noted approximate 10 pound weight gain over
the past several weeks.
 
Dyspnea:
The patient presents with dyspnea which is most consistent with a combined 
underlying COPD exacerbation and likely increasing fluid overload.  A recent 
echocardiogram performed demonstrated preserved LV systolic function, and a 
repeat of the same is currently not necessary.  
We will continue treatment aggressively as per pulmonary, and I would attempt 
mild diuresis if tolerated from a renal standpoint with increasing furosemide.
 
Anemia:
The patient has been receiving Procrit for her chronic anemia.  She feels 
overall improved following a transfusion
 
Atrial fibrillation:
Patient has known atrial fibrillation and is not anticoagulated secondary to a 
history of GI bleeds and anemia.  We will continue to maintain rate control.
Continue telemetry? No

## 2018-07-12 VITALS — SYSTOLIC BLOOD PRESSURE: 154 MMHG | DIASTOLIC BLOOD PRESSURE: 70 MMHG

## 2018-07-12 VITALS — SYSTOLIC BLOOD PRESSURE: 140 MMHG | DIASTOLIC BLOOD PRESSURE: 62 MMHG

## 2018-07-12 VITALS — DIASTOLIC BLOOD PRESSURE: 70 MMHG | SYSTOLIC BLOOD PRESSURE: 120 MMHG

## 2018-07-12 LAB
ERYTHROCYTE [DISTWIDTH] IN BLOOD BY AUTOMATED COUNT: 20.4 % (ref 11.5–14.5)
HCT VFR BLD CALC: 28.8 % (ref 37–47)
MCH RBC QN AUTO: 26 PG (ref 27–31)
MCHC RBC AUTO-ENTMCNC: 31.4 G/DL (ref 33–37)
MCV RBC AUTO: 82.8 FL (ref 81–99)
PLATELET # BLD: 420 /CUMM (ref 130–400)
PMV BLD AUTO: 8.7 FL (ref 7.4–10.4)
RED BLOOD CELL CT: 3.48 /CUMM (ref 4.2–5.4)
WBC # BLD AUTO: 14.6 /CUMM (ref 4.8–10.8)

## 2018-07-12 NOTE — PN- HOUSESTAFF
Subjective
Follow-up For:
ILD, COPD Exacerbation, hypoxemic respiratory failure
Complaints: no complaints
Tele-Events Since Last Visit:
NSR. 70-92
Subjective:
No episodes of dyspnea overnight.
 
Review of Systems
Constitutional:
Reports: see HPI. 
 
Objective
Last 24 Hrs of Vital Signs/I&O
 Vital Signs
 
 
Date Time Temp Pulse Resp B/P B/P Pulse O2 O2 Flow FiO2
 
     Mean Ox Delivery Rate 
 
 1601      94 Nasal 50% 
 
       Cannula  
 
 1443 99.1 90 28 154/70  93 Nasal  
 
       Cannula  
 
 1414      95 Nasal 50% 
 
       Cannula  
 
 1130      93 Nasal 55% 
 
       Cannula  
 
 0915      92 Nasal 60% 
 
       Cannula  
 
 0800       Nasal 60% 
 
       Cannula  
 
 0732  61  140/62     
 
 0731  61  140/62     
 
 0559 99.2 61 26 140/62  97 Nasal  
 
       Cannula  
 
 0113      97 Nasal 60% 
 
       Cannula  
 
 2255 98.6 91 26 116/50  92 Nasal  
 
       Cannula  
 
 2250      93 Nasal 60% 
 
       Cannula  
 
 2246      92 Nasal 60% 
 
       Cannula  
 
 2022  90       
 
 1915      89 Nasal 60% 
 
       Cannula  
 
 
 Intake & Output
 
 
  1600 12 0800  0000
 
Intake Total 500  161
 
Output Total  600 300
 
Balance 500 -600 -139
 
    
 
Intake, IV   11
 
Intake, Oral 500  150
 
Number 3 1 
 
Bowel   
 
Movements   
 
Output, Urine  600 300
 
Patient   151 lb
 
Weight   
 
 
 
 
Physical Exam
General Appearance: Alert, Oriented X3, Cooperative, No Acute Distress
Skin: No Rashes, No Breakdown, No Significant Lesion
Skin Temp/Moisture Exam: Cool/Dry
HEENT: Atraumatic, PERRLA, EOMI, Mucous Membr. moist/pink
Neck: Supple
Cardiovascular: Regular Rate, Normal S1, Normal S2, No Murmurs
Lungs: Clear to Auscultation, Normal Air Movement
Abdomen: Normal Bowel Sounds, Soft, No Tenderness, No Hepatospenomegaly
Neurological: Normal Speech, Strength at 5/5 X4 Ext
Extremities: No Clubbing, No Cyanosis, No Edema
 
Assessment/Plan
Assessment:
The patient is a 68-year-old woman with a past medical history of coronary 
artery disease (PCI in 2001 in 2014, as well as drug-eluting stents to the RCA 
in 2016) COPD, social lung disease, lung CA status post gamma knife radiation, 
paroxysmal atrial fibrillation (not on anticoagulation secondary to GI bleeds 
and anemia), chronic kidney disease and hypertension.  
 
Problem list:
-End-stage COPD
-Hypoxemic respiratory failure
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
-ILD
-pAfib: not on anticoagulation secondary to GI bleed and anemia
-Anemia
-HTN
-Low Hb
-CKD Stage III
 
Plan:
* Consider palliative care
* Consults today: Cardiology, pulmonology, nephrology
* Please follow current medications
* Limit dietary potassium to 2 g per day
* Follow hemoglobin
* Continue IV Solu-Medrol at current dose of q 12 hours
* Continue neb/total respiratory care.
* Wean oxygen down to for saturations greater than 90-92%.
* Diet: regular
* Code: DNR
* DVT Px: ALPS 
Problem List:
 1. Chronic disease anemia
 
 2. Lung cancer
 
 3. Decompensated COPD with exacerbation (chronic obstructive pulmonary disease 
)
 
 4. Chronic renal insufficiency
 
Pain Ratin
Pain Location:
none
Pain Goal: Remain pain free
Pain Plan:
n/a
Tomorrow's Labs & Rationales:
cbc, bep

## 2018-07-12 NOTE — PN- CARDIOLOGY
Subjective
Subjective:
Telemetry reveals sinus rhythm rate of 90 no significant arrhythmias patient has
significant shortness of breath with desaturation on exertion.
 
Objective
Vital Signs and I&Os
Vital Signs
 
 
Date Time Temp Pulse Resp B/P B/P Pulse O2 O2 Flow FiO2
 
     Mean Ox Delivery Rate 
 
07/12 0800       Nasal 60% 
 
       Cannula  
 
07/12 0732  61  140/62     
 
07/12 0731  61  140/62     
 
07/12 0559 99.2 61 26 140/62  97 Nasal  
 
       Cannula  
 
07/12 0113      97 Nasal 60% 
 
       Cannula  
 
07/11 2255 98.6 91 26 116/50  92 Nasal  
 
       Cannula  
 
07/11 2250      93 Nasal 60% 
 
       Cannula  
 
07/11 2246      92 Nasal 60% 
 
       Cannula  
 
07/11 2022  90       
 
07/11 1915      89 Nasal 60% 
 
       Cannula  
 
07/11 1617      90 Nasal 60% 
 
       Cannula  
 
07/11 1442 99.0 86 28 126/62  89 Nasal  
 
       Cannula  
 
07/11 1416      92 Nasal 60% 
 
       Cannula  
 
07/11 1209      93 Nasal 60% 
 
       Cannula  
 
 
 Intake & Output
 
 
 07/12 1600 07/12 0800 07/12 0000 07/11 1600 07/11 0800 07/11 0000
 
Intake Total   161 630 200 
 
Output Total  600 300 200 400 300
 
Balance  -600 -139 430 -200 -300
 
       
 
Intake, IV   11 30  
 
Intake, Oral   150 600 200 
 
Number  1  2  
 
Bowel      
 
Movements      
 
Output, Urine  600 300 200 400 300
 
Patient   151 lb   153 lb
 
Weight      
 
Weight      Bed scale
 
Measurement      
 
Method      
 
 
 
Physical Exam:
Patient is short of breath at rest requiring supplemental oxygen
Head normocephalic atraumatic
Eyes sclera anicteric conjunctiva showed pallor extraocular muscles are normal 
[place] neck no jugular venous distention no thyroid masses no palpable nodes
Chest lungs diffuse bilateral dry crackles
Heart regular rhythm with a 1/6 to 2/6 systolic murmur
Abdomen soft no organomegaly bowel sounds normal
Extremities no clubbing cyanosis or edema
Neurological no gross motor or sensory deficits
Current Medications:
 Current Medications
 
 
  Sig/Enrique Start time  Last
 
Medication Dose Route Stop Time Status Admin
 
Acetaminophen 650 MG Q8P PRN 07/08 2100 AC 
 
  PO   
 
Albuterol Sulfate 3 ML BID 07/09 2100 AC 07/11
 
  INH   1915
 
Alprazolam 0.5 MG BID 07/08 2100 AC 07/11
 
  PO 07/15 2059  2131
 
Amiodarone HCl 100 MG Q48 07/09 0900 DC 07/11
 
  PO   0820
 
Aspirin 81 MG DAILY 07/09 0900 AC 07/12
 
  PO   0732
 
Atorvastatin Calcium 40 MG 1700 07/09 1700 AC 07/11
 
  PO   1642
 
Azithromycin 250 MG DAILY 07/11 0900 AC 07/12
 
  PO 07/13 2100  0732
 
Bisacodyl 5 MG DAILY 07/11 0930 AC 07/11
 
  PO   1031
 
Budesonide/ 2 PUF BID 07/09 1030 AC 07/12
 
Formoterol Fumarate  INH   0733
 
Docusate Sodium 100 MG BID 07/11 0930 AC 07/11
 
  PO   1030
 
Ezetimibe 10 MG DAILY 07/09 0900 AC 07/12
 
  PO   0732
 
Furosemide 20 MG DAILY 07/12 0900 AC 07/12
 
  PO   0732
 
Furosemide 20 MG ONCE ONE 07/11 1445 DC 07/11
 
  IV 07/11 1446  1439
 
Furosemide 20 MG Q48 07/09 0900 DC 07/11
 
  PO   0820
 
Heparin Sodium  5,000 UNIT Q8 07/08 2200 AC 07/12
 
(Porcine)  SC   0520
 
Insulin Aspart 0 TIDAC 07/12 0800 CAN 
 
  SC   
 
Levothyroxine Sodium 0.05 MG 0700 07/10 0700 AC 07/11
 
  PO   0604
 
Methylprednisolone 40 MG Q12 07/09 2100 AC 07/12
 
  IV   0731
 
Metoprolol Succinate 50 MG DAILY 07/09 0900 AC 07/12
 
  PO   0732
 
Mirtazapine 15 MG AT BEDTIME 07/09 0015 AC 07/11
 
  PO   2131
 
Omeprazole 40 MG DAILY AC 07/10 0700 AC 07/12
 
  PO   0520
 
Ranolazine 1,000 MG BID 07/09 0002 AC 07/12
 
  PO   0731
 
Senna/Docusate Sodium 1 TAB AT BEDTIME 07/08 2100 AC 07/10
 
  PO   2005
 
 
 
 
Results
Last 48 Hrs of Labs/Mics:
 Laboratory Tests
 
07/12/18 0825:
Sodium Pending, Potassium Pending, Chloride Pending, Carbon Dioxide Pending, 
Anion Gap Pending, BUN Pending, Creatinine Pending, BUN/Creatinine Ratio Pending
, CBC w Diff Pending, WBC Pending, RBC Pending, Hgb Pending, Hct Pending, MCV 
Pending, MCH Pending, MCHC Pending, RDW Pending, Plt Count Pending, MPV Pending
 
07/11/18 0647:
Anion Gap 12, Estimated GFR 32  L, BUN/Creatinine Ratio 28.8  H, CBC w Diff MAN 
DIFF ORDERED, RBC 3.19  L, MCV 83.9, MCH 26.4  L, MCHC 31.5  L, RDW 20.7  H, MPV
9.4, Gran % 89.1  H, Lymphocytes % 4.8  L, Monocytes % 5.7, Eosinophils % 0.1, 
Basophils % 0.3, Absolute Granulocytes 12.4  H, Segmented Neutrophils 87  H, 
Absolute Lymphocytes 0.7  L, Lymphocytes 7  L, Monocytes 6, Absolute Monocytes 
0.8  H, Absolute Eosinophils 0, Absolute Basophils 0, Nucleated RBCs 1  H, 
Platelet Estimate VERIFIED BY SMEAR, Polychromasia 1+, Anisocytosis 1+
 
07/10/18 1040:
D-Dimer High Sensitivty 475  H
 
Recent Imaging Studies:
CT scan of the chest revealedDiffuse pulmonary emphysema with chronic 
interstitial pneumonitis, likely UIP
pattern. The density of the fibrotic interstitial changes increased from the
recent CT from 06/10/2018 which could be due to atelectasis, interstitial
edema, pneumonitis, or drug related phenomenon.
 
VQ scan revealed
Very low probability of pulmonary embolism.
 
Assessment/Plan
Assessment/Plan
In summary this 68-year-old female has the following problems
68-year-old woman with a past medical history of coronary artery disease (PCI in
2001 in 2014, as well as drug-eluting stents to the RCA in 2016) COPD, social 
lung disease, lung CA status post gamma knife radiation, paroxysmal atrial 
fibrillation (not on anticoagulation secondary to GI bleeds and anemia), chronic
kidney disease and hypertension.  She presents to our hospital with symptoms of 
increasing severe dyspnea and weakness, which has been refractory to her rescue 
asthma inhalers. 
 
I suspect worsening shortness of breath is related to interstitial pneumonitis 
superimposed on her chronic pulmonary issues which include COPD and lung cancer.
 I doubt there is a significant element of congestive heart failure.  Amiodarone
has been discontinued.
 
I would continue her current cardiac medications and follow pulmonary 
recommendations.
 
 
Continue telemetry? No

## 2018-07-12 NOTE — CONS- NEPHROLOGY
General Information and HPI
 
Consulting Request
Date of Consult: 18
Requested By:
Chauncey Hurt MD
 
Reason for Consult:
CKD
Source of Information: patient, old records
Exam Limitations: no limitations
History of Present Illness:
 
The patient is a 68-year-old woman with a background that includes squamous cell
lung carcinoma, COPD and CKD stage III secondary to hypertensive nephrosclerosis
(baseline creatinine has ranged from 1.5-2.2),  admitted on  with shortness 
of breath felt to be on the basis of exacerbation of COPD.  She remains on high 
flow oxygen but has clinically improved with no complaints today other than 
being generally anxious about her health.  Serum creatinine was 2.1 on admission
but has since stabilized at 1.6-1.7.  She was hyperkalemic on admission at 6.0 
but has had generally normal serum potassium level since then with only a minor 
elevation yesterday of 5.2 on .  Potassium today is 4.7.  She has had a 
tendency towards hyperkalemia in the past.  She also suffers from multifactorial
anemia including anemia of chronic kidney disease and receives Procrit as an 
outpatient on a every 3 week basis.  She has received 1 dose during this 
admission.  There are no other active renal issues at this time.
 
Past medical history is positive for CKD stage III secondary to hypertensive 
nephrosclerosis, acute kidney injury in  which was felt to be prerenal at 
that time, another bout of BRUCE at [facility] in  due to rhabdo (
statin, EtOH), CAD status post PCI with stents in , NSTEMI in  requiring
2 more stents (total 5) at [facility], paroxysmal atrial fibrillation 
not on anticoagulation due to previous GI bleed, ?pericarditis (no details), 
hypertension, hyperlipidemia, peptic ulcer disease with GI bleed, vitamin D 
deficiency, anxiety, chronic low back pain status post laminectomy, hysterectomy
, elbow surgery, skin graft and finally squamous cell carcinoma of the right 
lung in 2014 treated with CyberKnife RT with recent recurrence on left.
 
Medications: See below
 
Allergies: No known drug allergies
 
Family history: Mother  (leukemia), no family history of kidney disease
 
Social history: , lives alone, 2 children live out of state, had been 
working with special needs students at [employer], former smoker, 
denies history of alcohol or drug abuse.
 
 
 
Allergies/Medications
Allergies:
Coded Allergies:
No Known Allergies (16)
 
Home Med List:
Albuterol Sulfate (Proair Respiclick) 90 MCG AER.POW.BA   2 PUFF INH Q8P PRN 
COPD  (Reported)
Albuterol Sulfate 2.5 MG/3 ML (0.083 %) VIAL.NEB   1 Vial INH/SOL BID COPD  (
Reported)
Alprazolam 0.5 MG TABLET   1 TAB PO BID ANXIETY  (Reported)
Amiodarone HCl (Pacerone) 100 MG TABLET   100 MG PO EOD AFIB  (Reported)
Aspirin (Children's Aspirin) 81 MG TAB.CHEW   1 TAB PO DAILY HEART HEALTH  (
Reported)
Cholecalciferol (Vitamin D3) (Vitamin D3) 1,000 UNIT CAPSULE   1 CAP PO DAILY 
SUPPLEMENT  (Reported)
diphenhydrAMINE HCl (Benadryl) 25 MG CAPSULE   1 CAP PO QHS PRN SLEEP  (Reported
)
Epoetin Hermelindo (Epogen) (Unknown Strength) VIAL   (Unknown Dose) SC [id number] 
SUPPLEMENT  (Reported)
     Q 3 WEEKS, LAST TAKEN 18)
Ezetimibe (Zetia) 10 MG TABLET   1 TAB PO DAILY CHOLESTEROL  (Reported)
Fluticasone/Vilanterol (Breo Ellipta 100-25 Mcg INH) 100 MCG-25 MCG/DOSE 
BLST.W.DEV   1 PUFF INH DAILY COPD  (Reported)
Furosemide 20 MG TABLET   20 MG PO EOD HEART  (Reported)
Levothyroxine Sodium 50 MCG TABLET   1 TAB PO DAILY THYROID  (Reported)
Metoprolol Succ XL (Toprol Xl) 50 MG TAB.ER.24H   1 TAB PO DAILY AFIB  (Reported
)
Mirtazapine 15 MG TABLET   1 TAB PO QHS DEPRESSION  (Reported)
Omeprazole 40 MG CAPSULE.DR   1 TAB PO DAILY HEARTBURN  (Reported)
Ranolazine (Ranexa) 500 MG TAB.ER.12H   1,000 MG PO BID ANGINA  (Reported)
Rosuvastatin Calcium (Crestor) 10 MG TABLET   1 TAB PO DAILY HEART HEALTH  (
Reported)
Sennosides/Docusate Sodium (Senokot-S Tablet) 8.6 MG-50 MG TABLET   1 TAB PO 
DAILY PRN CONSTIPATION   (Reported)
Umeclidinium Bromide (Incruse Ellipta) 62.5 MCG/ACTUATION BLST.W.DEV   1 PUFF 
INH DAILY COPD  (Reported)
 
 
Review of Systems
Review of Systems:
 
Constitutional:
Denies: chills, diaphoresis, fever, malaise. 
Cardiovascular:
Denies: chest pain, edema, palpitations. 
Respiratory:
Reports: short of breath.  Denies: cough. 
GI:
Denies: abdominal pain, diarrhea. 
Genitourinary:
Denies: dysuria, frequency. 
Musculoskeletal:
Reports: back pain.  Denies: joint pain. 
Skin:
Reports: change in skin color. 
Neurological/Psychological:
Reports: headache, tremors (Hands).  Denies: numbness, tingling. 
 
 
 
Past History
 
Travel History
Traveled to [place] past 21 day No
 
Medical History
Blood Transfusion Hx: Yes
Neurological: NONE
EENT: NONE
Cardiovascular: CAD, hypertension, hyperlipidemia, myocardial infarction
Respiratory: pulmonary fibrosis, PNEUMONIA ;copd;emphysema LUNG CA ILD
Gastrointestinal: peptic ulcer disease
Hepatic: NONE
Renal: chronic kidney disease
Musculoskeletal: disk herniation
Psychiatric: anxiety
Endocrine: NONE
Blood Disorders: anemia
Cancer(s): lung cancer
GYN/Reproductive: NONE
 
Surgical History
Surgical History: STENTS (5) HYSTERECTOMY,LAMINECTOMY
 
Family History
Relations & Conditions If Any:
MOTHER (Leukemia).
FATHER (Heart Attack).
SISTER (ovarian cancer).
BROTHER (Diabetes).
 
 
Psychosocial History
Where Do You Live? Home
Who Do You Live With? self
Services at Home: Nursing, Physical Therapy
Primary Language: English
Smoking Status: Former Smoker
ETOH Use: denies use
Illicit Drug Use: denies illicit drug use
 
Functional Ability
ADLs
Independent: dressing, eating, toileting, bathing. 
Ambulation: cane
IADLs
Independent: shopping, housework, finances, food prep, telephone, transportation
, medication admin. 
 
Exam & Diagnostic Data
Vital Signs and I&O
Vital Signs
 
 
Date Time Temp Pulse Resp B/P B/P Pulse O2 O2 Flow FiO2
 
     Mean Ox Delivery Rate 
 
 1414      95 Nasal 50% 
 
       Cannula  
 
 1130      93 Nasal 55% 
 
       Cannula  
 
 0915      92 Nasal 60% 
 
       Cannula  
 
 0800       Nasal 60% 
 
       Cannula  
 
 0732  61  140/62     
 
 0731  61  140/62     
 
 0559 99.2 61 26 140/62  97 Nasal  
 
       Cannula  
 
 0113      97 Nasal 60% 
 
       Cannula  
 
 2255 98.6 91 26 116/50  92 Nasal  
 
       Cannula  
 
 2250      93 Nasal 60% 
 
       Cannula  
 
 2246      92 Nasal 60% 
 
       Cannula  
 
2022  90       
 
07/11 1915      89 Nasal 60% 
 
       Cannula  
 
 1617      90 Nasal 60% 
 
       Cannula  
 
 1442 99.0 86 28 126/62  89 Nasal  
 
       Cannula  
 
 
 Intake & Output
 
 
 0 07/11 1600 07/11 0400 07/10 1600 07/10 0400
 
Intake Total 500 161 830  1340 200
 
Output Total 600 300 600 300 300 250
 
Balance -100 -139 230 -300 1040 -50
 
       
 
Intake, IV  11 30  400 200
 
Intake, Oral 500 150 800  940 
 
Number 4  2   
 
Bowel      
 
Movements      
 
Output, Urine 600 300 600 300 300 250
 
Patient  151 lb  153 lb  149 lb
 
Weight      
 
Weight    Bed scale  
 
Measurement      
 
Method      
 
 
 
Physical Exam:
 
General: Well-developed somewhat cachectic appearing white female on high flow 
oxygen in NAD
Skin: No rash or jaundice
HEENT: Conjunctivae pale, sclerae anicteric, mucous membranes dry
Neck: Without masses or thyromegaly, no supraclavicular or cervical adenopathy
Chest: Fine rales on right, scattered wheezes
Heart: Regular rate and rhythm without S3 or rub
Abdomen: Soft and nontender without palpable masses or organomegaly
Extremities: Without cyanosis or edema
Neuro: Cognitively intact, no focal findings, no asterixis or myoclonus
 
 
 
Assessment/Plan
 
Assessment/Recommendations
Assessment:
 
68-year-old woman with multiple comorbidities as noted above including recurrent
squamous cell carcinoma of the lung, COPD and chronic kidney disease secondary 
to hypertensive nephrosclerosis with a baseline creatinine generally under 2.  
She now comes in with shortness of breath felt to be on the basis of 
exacerbation of COPD with only a transient elevation of her serum creatinine 
above baseline and hyperkalemia at time of admission but not an issue since 
then.  She has had a tendency towards hyperkalemia in the past.  Finally, she is
chronically anemic on a multifactorial basis including the anemia of CKD for 
which she receives subcutaneous Procrit injections on a regular basis as an 
outpatient.  She has already been given a dose during this hospitalization.
 
 
Recommendations:
 
1.  Continue current medication regimen; no need for any further Procrit at this
time
 
2.  Limit dietary potassium to 2 g per day
 
3.  Management of her COPD per Pulmonary
 
Thank you.  We will follow along with you while she is in the hospital.

## 2018-07-12 NOTE — PN- PULMONARY
Subjective
HPI/Critical Care Issues:
The patient is awake and alert.  She reports feeling improved with respect to 
her respiratory status.  She is less short of breath.  She is unable to 
expectorate.  She remains on high flow oxygen.  With any significant exertion, 
her saturations drop into the 70s with rapid recovery.  She was given multiple 
medications to alleviate her constipation, noting this resulted in cramping and 
loose stools.
 
Objective
Current Medications:
 Current Medications
 
 
  Sig/Enrique Start time  Last
 
Medication Dose Route Stop Time Status Admin
 
Acetaminophen 650 MG Q8P PRN 07/08 2100 AC 
 
  PO   
 
Albuterol Sulfate 3 ML BID 07/09 2100 AC 07/11
 
  INH   1915
 
Alprazolam 0.5 MG BID 07/08 2100 AC 07/11
 
  PO 07/15 2059  2131
 
Amiodarone HCl 100 MG Q48 07/09 0900 DC 07/11
 
  PO   0820
 
Aspirin 81 MG DAILY 07/09 0900 AC 07/12
 
  PO   0732
 
Atorvastatin Calcium 40 MG 1700 07/09 1700 AC 07/11
 
  PO   1642
 
Azithromycin 250 MG DAILY 07/11 0900 AC 07/12
 
  PO 07/13 2100  0732
 
Bisacodyl 5 MG DAILY 07/11 0930 AC 07/11
 
  PO   1031
 
Budesonide/ 2 PUF BID 07/09 1030 AC 07/12
 
Formoterol Fumarate  INH   0733
 
Docusate Sodium 100 MG BID 07/11 0930 AC 07/11
 
  PO   1030
 
Ezetimibe 10 MG DAILY 07/09 0900 AC 07/12
 
  PO   0732
 
Furosemide 20 MG DAILY 07/12 0900 AC 07/12
 
  PO   0732
 
Furosemide 20 MG ONCE ONE 07/11 1445 DC 07/11
 
  IV 07/11 1446  1439
 
Furosemide 20 MG Q48 07/09 0900 DC 07/11
 
  PO   0820
 
Heparin Sodium  5,000 UNIT Q8 07/08 2200 AC 07/12
 
(Porcine)  SC   0520
 
Insulin Aspart 0 TIDAC 07/12 0800 CAN 
 
  SC   
 
Levothyroxine Sodium 0.05 MG 0700 07/10 0700 AC 07/11
 
  PO   0604
 
Methylprednisolone 40 MG Q12 07/09 2100 AC 07/12
 
  IV   0731
 
Metoprolol Succinate 50 MG DAILY 07/09 0900 AC 07/12
 
  PO   0732
 
Mirtazapine 15 MG AT BEDTIME 07/09 0015 AC 07/11
 
  PO   2131
 
Omeprazole 40 MG DAILY AC 07/10 0700 AC 07/12
 
  PO   0520
 
Ranolazine 1,000 MG BID 07/09 0002 AC 07/12
 
  PO   0731
 
Senna/Docusate Sodium 1 TAB AT BEDTIME 07/08 2100 AC 07/10
 
  PO   2005
 
 
 
 
Vital Signs & I&O
Last 24 Hrs of Vitals and I&O:
 Vital Signs
 
 
Date Time Temp Pulse Resp B/P B/P Pulse O2 O2 Flow FiO2
 
     Mean Ox Delivery Rate 
 
07/12 0800       Nasal 60% 
 
       Cannula  
 
07/12 0732  61  140/62     
 
07/12 0731  61  140/62     
 
07/12 0559 99.2 61 26 140/62  97 Nasal  
 
       Cannula  
 
07/12 0113      97 Nasal 60% 
 
       Cannula  
 
07/11 2255 98.6 91 26 116/50  92 Nasal  
 
       Cannula  
 
07/11 2250      93 Nasal 60% 
 
       Cannula  
 
07/11 2246      92 Nasal 60% 
 
       Cannula  
 
07/11 2022  90       
 
07/11 1915      89 Nasal 60% 
 
       Cannula  
 
07/11 1617      90 Nasal 60% 
 
       Cannula  
 
07/11 1442 99.0 86 28 126/62  89 Nasal  
 
       Cannula  
 
07/11 1416      92 Nasal 60% 
 
       Cannula  
 
07/11 1209      93 Nasal 60% 
 
       Cannula  
 
 
 Intake & Output
 
 
 07/12 1600 07/12 0800 07/12 0000
 
Intake Total   161
 
Output Total  600 300
 
Balance  -600 -139
 
    
 
Intake, IV   11
 
Intake, Oral   150
 
Number  1 
 
Bowel   
 
Movements   
 
Output, Urine  600 300
 
Patient   151 lb
 
Weight   
 
 
Physical Exam
General Appearance: alert, awake, anxious, comfortable
Head: atraumatic, normal appearance
Neck: supple
Respiratory: decreased breath sounds, wheezing, bibasilar fine crackles
Cardiovascular: S1 and S2 heard
Gastrointestinal: normal bowel sounds, soft, non-tender
Extremities: no edema
Skin: intact, normal color, warm/dry
 
Results
Last 24 Hrs of Lab Results:
 Laboratory Tests
 
07/12/18 0825:
Sodium Pending, Potassium Pending, Chloride Pending, Carbon Dioxide Pending, 
Anion Gap Pending, BUN Pending, Creatinine Pending, BUN/Creatinine Ratio Pending
, CBC w Diff Pending, WBC Pending, RBC Pending, Hgb Pending, Hct Pending, MCV 
Pending, MCH Pending, MCHC Pending, RDW Pending, Plt Count Pending, MPV Pending
 
 
Impression/Plan
 
Impression/Plan
Impression/Plan:
1.  Acute exacerbation of COPD.  CT scan demonstrates very severe emphysema, and
interstitial pneumonitis which could explain progressive respiratory failure as 
well.  
2.  Hypoxemic respiratory failure.  No evidence of pulmonary embolism on VQ 
scan.
3.  Lingular moderate to poorly differentiated squamous cell carcinoma - not yet
treated.
4.  Paroxysmal atrial fibrillation, not on anticoagulation secondary to GI bleed
and anemia.
5.  Chronic kidney disease.
6.  History of hypertension.
7.  Anemia without evidence of active bleeding.
8.  Previous history of lung cancer status post right sided gamma knife 
radiation.
 
Recommendations:
* [place] evaluation for oxygen titration and pulmonary rehabilitation in the 
setting of COPD exacerbation.
* Continue IV Solu-Medrol at current dose of q 12 hours.  Do not change to oral 
prednisone as of yet.
* Continue neb/total respiratory care.
* Wean oxygen down to for saturations greater than 90-92%.
* Follow-up in the morning lab results.
* Management of medical comorbidities as per primary team.
* Oncology consult to be called.
* DVT prophylaxis at all times.
* Continue all supportive care.
* Out of bed to chair if able.

## 2018-07-13 VITALS — SYSTOLIC BLOOD PRESSURE: 154 MMHG | DIASTOLIC BLOOD PRESSURE: 64 MMHG

## 2018-07-13 VITALS — SYSTOLIC BLOOD PRESSURE: 140 MMHG | DIASTOLIC BLOOD PRESSURE: 60 MMHG

## 2018-07-13 VITALS — SYSTOLIC BLOOD PRESSURE: 120 MMHG | DIASTOLIC BLOOD PRESSURE: 60 MMHG

## 2018-07-13 LAB
ERYTHROCYTE [DISTWIDTH] IN BLOOD BY AUTOMATED COUNT: 19.7 % (ref 11.5–14.5)
HCT VFR BLD CALC: 27.7 % (ref 37–47)
MCH RBC QN AUTO: 26.1 PG (ref 27–31)
MCHC RBC AUTO-ENTMCNC: 31.6 G/DL (ref 33–37)
MCV RBC AUTO: 82.6 FL (ref 81–99)
PLATELET # BLD: 379 /CUMM (ref 130–400)
PMV BLD AUTO: 8.9 FL (ref 7.4–10.4)
RED BLOOD CELL CT: 3.36 /CUMM (ref 4.2–5.4)
WBC # BLD AUTO: 17.2 /CUMM (ref 4.8–10.8)

## 2018-07-13 NOTE — PN- HOUSESTAFF
Wendy Ballard 18 0649:
Subjective
Follow-up For:
ILD, COPD exacerbation, hypoxemic respiratory failure
Complaints: no complaints
Tele-Events Since Last Visit:
Normal sinus rhythm heart rate 69-87.  On oxygen monitoring she was satting at 
94% on 50% high flow oxygen.  She desats to the 70s when she moves out of bed to
use the commode
Subjective:
Patient states that her breathing feels better this morning
 
Review of Systems
Constitutional:
Reports: see HPI. 
 
Objective
Last 24 Hrs of Vital Signs/I&O
 Vital Signs
 
 
Date Time Temp Pulse Resp B/P B/P Pulse O2 O2 Flow FiO2
 
     Mean Ox Delivery Rate 
 
 1101      92 Nasal 50% 
 
       Cannula  
 
 0854  92  164/82     
 
 0853  92  164/82     
 
 0656 97.7 72 18 154/64  94 Nasal 50% 
 
       Cannula  
 
 0114      94 Nasal 50% 
 
       Cannula  
 
 2221      94 Nasal 50% 
 
       Cannula  
 
 2209 99.0 84 20 120/70  98   
 
 2134      90 Nasal 50% 
 
       Cannula  
 
 1920      90 Nasal 50% 
 
       Cannula  
 
 1601      94 Nasal 50% 
 
       Cannula  
 
 1600      89 Nasal 50% 
 
       Cannula  
 
 1443 99.1 90 28 154/70  93 Nasal  
 
       Cannula  
 
 1414      95 Nasal 50% 
 
       Cannula  
 
 
 Intake & Output
 
 
  1600  0800  0000
 
Intake Total  100 111
 
Output Total  400 250
 
Balance  -300 -139
 
    
 
Intake, IV   11
 
Intake, Oral  100 100
 
Number  1 2
 
Bowel   
 
Movements   
 
Output, Urine  400 250
 
Patient   150 lb
 
Weight   
 
Weight   Bed scale
 
Measurement   
 
Method   
 
 
 
 
Physical Exam
General Appearance: Alert, Oriented X3, Cooperative, No Acute Distress
Skin: No Rashes, No Breakdown, No Significant Lesion
Skin Temp/Moisture Exam: Cool/Dry
HEENT: Atraumatic
Neck: Supple
Cardiovascular: Regular Rate, Normal S1, Normal S2, No Murmurs
Lungs: Clear to Auscultation, Normal Air Movement (B/L CRACKLES-IMPROVED)
Abdomen: Normal Bowel Sounds, Soft, No Tenderness
Neurological: Normal Speech, Strength at 5/5 X4 Ext
Extremities: No Clubbing, No Cyanosis, No Edema
 
Assessment/Plan
Assessment:
The patient is a 68-year-old woman with a past medical history of coronary 
artery disease (PCI in 2001 in 2014, as well as drug-eluting stents to the RCA 
in 2016) COPD, social lung disease, lung CA status post gamma knife radiation, 
paroxysmal atrial fibrillation (not on anticoagulation secondary to GI bleeds 
and anemia), chronic kidney disease and hypertension.  
Vitals: stable
Her potassium was 5.6 this morning.
Problem list:
-End-stage COPD
-Hypoxemic respiratory failure
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
-ILD
-pAfib: not on anticoagulation secondary to GI bleed and anemia
-Anemia
-HTN
-Low Hb
-CKD Stage III
 
Plan:
* Consider palliative care
* Please follow current medications
* Limit dietary potassium to 2 g per day
* Follow hemoglobin & K
* Continue IV Solu-Medrol at current dose of q 12 hours
* Continue neb/total respiratory care.
* Wean oxygen down to for saturations greater than 90-92%.
* Diet: regular
* Code: DNR
* DVT Px: ALPS 
Problem List:
 1. CKD (chronic kidney disease)
 
 2. Lung cancer
 
 3. Dyspnea
 
 4. Interstitial lung disease
 
 5. Decompensated COPD with exacerbation (chronic obstructive pulmonary disease 
)
 
Pain Ratin
Pain Location:
None
Pain Goal: Remain pain free
Pain Plan:
n/a
Tomorrow's Labs & Rationales:
cbc, BEP
 
 
Blu NEWBERRY,Chauncey 18 1418:
Attending MD Review Statement
 
Attending Statement
Attending MD Statement: examined this patient, discuss w/resident/PA/NP, agreed 
w/resident/PA/NP, reviewed EMR data (avail), discussed with nursing, discussed 
with case mgmt, amended to note
Attending Assessment/Plan:
Patient seen and examined. resting comfortably in bed. Still on High Flow oxygen
with 50% FiO2. Desaturates into the 70s with activity. Se is upset about her 
potassium restricted diet. Complains of loos stolls. Denies abdominal pain. On 
examination she does not appear to be in espiratory distress at rest althoughshe
get very emotional easily. She has poor air entry bilaterally with bibasilar 
crackles. No JVD. No pedal edema.
 
Recommendations:
- Continue oxygen supplementation. Wean down as tolerated. Case discussed with 
the pulmonary service, if patient desaturates again, recommend transfer to the 
ICU for closer monitoring. 
- Continue systemic steroids per the pulmonary service.
- Continue gentlediuresis. Patient had an elevation of her renal function with 
no improvement of respiratory systoms after more aggressive diuresis. 
- Patienbt wants all possible treatment. Case was disccused with theoncology 
service. She is not a candidate for any kind of chemotherapy. She may be a 
candidate for radiation therapy if she improves clinically. 
- Long term prognosis is poor.

## 2018-07-13 NOTE — PN- PULMONARY
Subjective
HPI/Critical Care Issues:
The patient is awake and alert.  She complains of ongoing anxiety.  She remains 
on high flow oxygen to support her saturations.  She has no significant cough.  
She has not yet been sitting out of bed.
 
Objective
Current Medications:
 Current Medications
 
 
  Sig/Enrique Start time  Last
 
Medication Dose Route Stop Time Status Admin
 
Acetaminophen 650 MG Q8P PRN 07/08 2100 AC 
 
  PO   
 
Albuterol Sulfate 3 ML BID 07/09 2100 AC 07/12
 
  INH   1920
 
Alprazolam 0.5 MG BID 07/08 2100 AC 07/12
 
  PO 07/15 2059  2139
 
Aspirin 81 MG DAILY 07/09 0900 AC 07/12
 
  PO   0732
 
Atorvastatin Calcium 40 MG 1700 07/09 1700 AC 07/12
 
  PO   1708
 
Azithromycin 250 MG DAILY 07/11 0900 AC 07/12
 
  PO 07/13 2100  0732
 
Bisacodyl 5 MG DAILY 07/11 0930 DC 07/11
 
  PO   1031
 
Budesonide/ 2 PUF BID 07/09 1030 AC 07/12
 
Formoterol Fumarate  INH   2054
 
Docusate Sodium 100 MG BID 07/11 0930 DC 07/11
 
  PO   1030
 
Ezetimibe 10 MG DAILY 07/09 0900 AC 07/12
 
  PO   0732
 
Furosemide 20 MG DAILY 07/12 0900 AC 07/12
 
  PO   0732
 
Heparin Sodium  5,000 UNIT Q8 07/08 2200 AC 07/13
 
(Porcine)  SC   0547
 
Insulin Aspart 0 TIDAC 07/12 1700 AC 07/12
 
  SC   1709
 
Levothyroxine Sodium 0.05 MG 0700 07/10 0700 AC 07/13
 
  PO   0547
 
Methylprednisolone 40 MG Q12 07/09 2100 AC 07/12
 
  IV   2055
 
Metoprolol Succinate 50 MG DAILY 07/09 0900 AC 07/12
 
  PO   0732
 
Mirtazapine 15 MG AT BEDTIME 07/09 0015 AC 07/12
 
  PO   2139
 
Omeprazole 40 MG DAILY AC 07/10 0700 AC 07/13
 
  PO   0547
 
Ranolazine 1,000 MG BID 07/09 0002 AC 07/12
 
  PO   2054
 
Senna/Docusate Sodium 1 TAB AT BEDTIME AS NEED.. 07/12 1133 AC 
 
  PO   
 
Senna/Docusate Sodium 1 TAB AT BEDTIME 07/08 2100 DC 07/10
 
  PO   2005
 
 
 
 
Vital Signs & I&O
Last 24 Hrs of Vitals and I&O:
 Vital Signs
 
 
Date Time Temp Pulse Resp B/P B/P Pulse O2 O2 Flow FiO2
 
     Mean Ox Delivery Rate 
 
07/13 0656 97.7 72 18 154/64  94 Nasal 50% 
 
       Cannula  
 
07/13 0114      94 Nasal 50% 
 
       Cannula  
 
07/12 2221      94 Nasal 50% 
 
       Cannula  
 
07/12 2209 99.0 84 20 120/70  98   
 
07/12 2134      90 Nasal 50% 
 
       Cannula  
 
07/12 1920      90 Nasal 50% 
 
       Cannula  
 
07/12 1601      94 Nasal 50% 
 
       Cannula  
 
07/12 1600      89 Nasal 50% 
 
       Cannula  
 
07/12 1443 99.1 90 28 154/70  93 Nasal  
 
       Cannula  
 
07/12 1414      95 Nasal 50% 
 
       Cannula  
 
07/12 1130      93 Nasal 55% 
 
       Cannula  
 
07/12 0915      92 Nasal 60% 
 
       Cannula  
 
 
 Intake & Output
 
 
 07/13 1600 07/13 0800 07/13 0000
 
Intake Total  100 111
 
Output Total  400 250
 
Balance  -300 -139
 
    
 
Intake, IV   11
 
Intake, Oral  100 100
 
Number  1 2
 
Bowel   
 
Movements   
 
Output, Urine  400 250
 
Patient   150 lb
 
Weight   
 
Weight   Bed scale
 
Measurement   
 
Method   
 
 
Physical Exam
General Appearance: alert, awake, anxious, comfortable
Head: atraumatic, normal appearance
Neck: supple
Respiratory: decreased breath sounds, wheezing, bibasilar fine crackles
Cardiovascular: S1 and S2 heard
Gastrointestinal: normal bowel sounds, soft, non-tender
Extremities: no edema
Skin: intact, normal color, warm/dry
 
 
Results
Last 24 Hrs of Lab Results:
 Laboratory Tests
 
07/13/18 0655:
Anion Gap 12, Estimated GFR 30  L, BUN/Creatinine Ratio 30.0  H, CBC w Diff MAN 
DIFF ORDERED, WBC Pending, RBC Pending, Hgb Pending, Hct Pending, MCV Pending, 
MCH Pending, MCHC Pending, RDW Pending, Plt Count Pending, MPV Pending, 
Segmented Neutrophils Pending
 
07/12/18 0825:
Anion Gap 15, Estimated GFR 30  L, BUN/Creatinine Ratio 26.5  H, CBC w Diff MAN 
DIFF ORDERED, RBC 3.48  L, MCV 82.8, MCH 26.0  L, MCHC 31.4  L, RDW 20.4  H, MPV
8.7, Segmented Neutrophils 87  H, Lymphocytes 4  L, Monocytes 5, Metamyelocytes 
2  H, Myelocytes 2  H, Nucleated RBCs 6  H, Platelet Estimate VERIFIED BY SMEAR,
Polychromasia 1+, Hypochromic-Microcytic 1+, Poikilocytosis 1+, Anisocytosis 1+,
Ovalocytes FEW
 
Last 24 Hrs of Micro Results:
Sputum not available for evaluation.
 
Impression/Plan
 
Impression/Plan
Impression/Plan:
1.  Acute exacerbation of COPD.  CT scan demonstrates very severe emphysema, and
interstitial pneumonitis which could explain progressive respiratory failure as 
well.  
2.  Hypoxemic respiratory failure.  No evidence of pulmonary embolism on VQ 
scan.
3.  Lingular moderate to poorly differentiated squamous cell carcinoma - not yet
treated.
4.  Paroxysmal atrial fibrillation, not on anticoagulation secondary to previous
GI bleed and anemia.
5.  Chronic kidney disease stage 3 secondary to hypertensive nephrosclerosis.
6.  History of hypertension.
7.  Anemia without evidence of active bleeding.
8.  Previous history of lung cancer status post right sided gamma knife 
radiation.
9.  Hyperkalemia.
10.  Severe anxiety.
Recommendations:
* Continue IV Solu-Medrol at current dose of q 12 hours.  Do not change to oral 
prednisone as of yet.
* Continue neb/total respiratory care.
* Wean oxygen down to for saturations greater than 90-92%.
* Follow-up in the morning lab results.
* Management of medical comorbidities as per primary team.
* Oncology consult?
* DVT prophylaxis at all times.
* Continue all supportive care.
* Out of bed to chair if able.

## 2018-07-13 NOTE — PN- NEPHROLOGY
Assessment/Plan Nephrology
Assessment:
 
1.  CKD stage III secondary to hypertensive nephrosclerosis -stable
 
2.  Tendency towards hyperkalemia with an elevated serum potassium today 
possibly related to catabolic effect of high-dose steroids in the setting of 
decreased GFR
 
3.  COPD with exacerbation
 
4.  Recurrent squamous cell carcinoma of the lung
 
 
Suggestion:
 
1.  Taper steroids as feasible
 
2.  As patient is vociferously complaining about the change in her diet since 
she has been placed on potassium restriction, would ask dietary to simply avoid 
very high potassium foods such as bananas, oranges/juice, grapefruit/juice, 
tomatoes/juice/sauce, milk and potatoes.  She can otherwise simply be on a 
sodium restricted diet.
 
3.  Would avoid Kayexalate unless her potassium continues to rise
 
4.  Mobilize as feasible which is currently difficult because of her tendency to
desaturate.
 
 
 
 
Subjective
Subjective:
 
Her main complaint today is the change in her diet with regard to potassium 
restriction which she finds confusing and distressing.  She has thus far been 
unable to get off of the high flow oxygen.  Renal function remains stable.  
Serum potassium up to 5.6, as is her WBC, in the setting of high-dose parenteral
steroids (Solu-Medrol).  She remains afebrile with fairly stable vital signs.
 
 
 
Objective
Vital Signs and I&Os
Vital Signs
 
 
Date Time Temp Pulse Resp B/P B/P Pulse O2 O2 Flow FiO2
 
     Mean Ox Delivery Rate 
 
07/13 1101      92 Nasal 50% 
 
       Cannula  
 
07/13 0854  92  164/82     
 
07/13 0853  92  164/82     
 
07/13 0800      93 Nasal 50% 
 
       Cannula  
 
07/13 0656 97.7 72 18 154/64  94 Nasal 50% 
 
       Cannula  
 
07/13 0114      94 Nasal 50% 
 
       Cannula  
 
07/12 2221      94 Nasal 50% 
 
       Cannula  
 
07/12 2209 99.0 84 20 120/70  98   
 
07/12 2134      90 Nasal 50% 
 
       Cannula  
 
07/12 1920      90 Nasal 50% 
 
       Cannula  
 
07/12 1601      94 Nasal 50% 
 
       Cannula  
 
07/12 1600      89 Nasal 50% 
 
       Cannula  
 
07/12 1443 99.1 90 28 154/70  93 Nasal  
 
       Cannula  
 
07/12 1414      95 Nasal 50% 
 
       Cannula  
 
 
 Intake & Output
 
 
 07/13 1600 07/13 0400 07/12 1600 07/12 0400 07/11 1600 07/11 0400
 
Intake Total 100 111 500 161 830 
 
Output Total 400 250 600 300 600 300
 
Balance -300 -139 -100 -139 230 -300
 
       
 
Intake, IV  11  11 30 
 
Intake, Oral 100 100 500 150 800 
 
Number 1 2 4  2 
 
Bowel      
 
Movements      
 
Output, Urine 400 250 600 300 600 300
 
Patient  150 lb  151 lb  153 lb
 
Weight      
 
Weight  Bed scale    Bed scale
 
Measurement      
 
Method      
 
 
 
Physical Exam:
 
General: Well-developed somewhat cachectic appearing white female on high flow 
oxygen somewhat anxious today but in NAD
Skin: No rash or jaundice
HEENT: Conjunctivae pale, sclerae anicteric, mucous membranes dry
Neck: Without masses or thyromegaly, no supraclavicular or cervical adenopathy
Chest: Fine end inspiratory rales on right, scattered wheezes
Heart: Regular rate and rhythm without S3 or rub
Abdomen: Soft and nontender without palpable masses or organomegaly
Extremities: Without cyanosis or edema
Neuro: Cognitively intact, no focal findings, no asterixis or myoclonus
 
 
 
Results
Pertinent Lab Results:
 Laboratory Tests
 
 
 07/13 07/12
 
 0655 0825
 
Chemistry  
 
  Sodium (137 - 145 mmol/L) 141 145
 
  Potassium (3.5 - 5.1 mmol/L) 5.6  H 4.7
 
  Chloride (98 - 107 mmol/L) 107 106
 
  Carbon Dioxide (22 - 30 mmol/L) 22 24
 
  Anion Gap (5 - 16) 12 15
 
  BUN (7 - 17 mg/dL) 51  H 45  H
 
  Creatinine (0.5 - 1.0 mg/dL) 1.7  H 1.7  H
 
  Estimated GFR (>60 ml/min) 30  L 30  L
 
  BUN/Creatinine Ratio (7 - 25 %) 30.0  H 26.5  H
 
Hematology  
 
  CBC w Diff MAN DIFF ORDERED MAN DIFF ORDERED
 
  WBC (4.8 - 10.8 /CUMM) 17.2  H 14.6  H
 
  RBC (4.20 - 5.40 /CUMM) 3.36  L 3.48  L
 
  Hgb (12.0 - 16.0 G/DL) 8.8  L 9.1  L
 
  Hct (37 - 47 %) 27.7  L 28.8  L
 
  MCV (81.0 - 99.0 FL) 82.6 82.8
 
  MCH (27.0 - 31.0 PG) 26.1  L 26.0  L
 
  MCHC (33.0 - 37.0 G/DL) 31.6  L 31.4  L
 
  RDW (11.5 - 14.5 %) 19.7  H 20.4  H
 
  Plt Count (130 - 400 /CUMM) 379 420  H
 
  MPV (7.4 - 10.4 FL) 8.9 8.7
 
  Segmented Neutrophils (42.2 - 75.2 %) 86  H 87  H
 
  Band Neutrophils (0.0 - 5.0 %) 2 
 
  Lymphocytes (20.5 - 51.1 %) 7  L 4  L
 
  Monocytes (1.7 - 9.3 %) 5 5
 
  Metamyelocytes (0.0 - 1.0 %)  2  H
 
  Myelocytes (0 - 0 %)  2  H
 
  Nucleated RBCs (0.0 - 0.0 /100WBC) 3  H 6  H
 
  Platelet Estimate (ADEQUATE) INCREASED VERIFIED BY SMEAR
 
  Polychromasia 2+ 1+
 
  Hypochromic-Microcytic 3+ 1+
 
  Poikilocytosis 2+ 1+
 
  Anisocytosis 2+ 1+
 
  Ovalocytes 1+ FEW
 
 
 
 
 07/11
 
 0647
 
Chemistry 
 
  Sodium (137 - 145 mmol/L) 145
 
  Potassium (3.5 - 5.1 mmol/L) 5.2  H
 
  Chloride (98 - 107 mmol/L) 114  H
 
  Carbon Dioxide (22 - 30 mmol/L) 19  L
 
  Anion Gap (5 - 16) 12
 
  BUN (7 - 17 mg/dL) 46  H
 
  Creatinine (0.5 - 1.0 mg/dL) 1.6  H
 
  Estimated GFR (>60 ml/min) 32  L
 
  BUN/Creatinine Ratio (7 - 25 %) 28.8  H
 
Hematology 
 
  CBC w Diff MAN DIFF ORDERED
 
  WBC (4.8 - 10.8 /CUMM) 13.9  H
 
  RBC (4.20 - 5.40 /CUMM) 3.19  L
 
  Hgb (12.0 - 16.0 G/DL) 8.4  L
 
  Hct (37 - 47 %) 26.8  L
 
  MCV (81.0 - 99.0 FL) 83.9
 
  MCH (27.0 - 31.0 PG) 26.4  L
 
  MCHC (33.0 - 37.0 G/DL) 31.5  L
 
  RDW (11.5 - 14.5 %) 20.7  H
 
  Plt Count (130 - 400 /CUMM) 338
 
  MPV (7.4 - 10.4 FL) 9.4
 
  Gran % (42.2 - 75.2 %) 89.1  H
 
  Lymphocytes % (20.5 - 51.1 %) 4.8  L
 
  Monocytes % (1.7 - 9.3 %) 5.7
 
  Eosinophils % (0 - 5 %) 0.1
 
  Basophils % (0.0 - 2.0 %) 0.3
 
  Absolute Granulocytes (1.4 - 6.5 /CUMM) 12.4  H
 
  Segmented Neutrophils (42.2 - 75.2 %) 87  H
 
  Absolute Lymphocytes (1.2 - 3.4 /CUMM) 0.7  L
 
  Lymphocytes (20.5 - 51.1 %) 7  L
 
  Monocytes (1.7 - 9.3 %) 6
 
  Absolute Monocytes (0.10 - 0.60 /CUMM) 0.8  H
 
  Absolute Eosinophils (0.0 - 0.7 /CUMM) 0
 
  Absolute Basophils (0.0 - 0.2 /CUMM) 0
 
  Nucleated RBCs (0.0 - 0.0 /100WBC) 1  H
 
  Platelet Estimate (ADEQUATE) VERIFIED BY SMEAR
 
  Polychromasia 1+
 
  Anisocytosis 1+

## 2018-07-13 NOTE — PN- CARDIOLOGY
Subjective
Subjective:
The patient is awake, alert, feels improved from a respiratory standpoint
The events of the last 24 hours as well as telemetry were reviewed.
 
Review of Systems:
The review of systems is negative for chest pains, palpitations nor 
lightheadedness.
The remainder of the 14 point review of systems is noncontributory with the 
exception of above.
 
Objective
Vital Signs and I&Os
Vital Signs
 
 
Date Time Temp Pulse Resp B/P B/P Pulse O2 O2 Flow FiO2
 
     Mean Ox Delivery Rate 
 
07/13 1101      92 Nasal 50% 
 
       Cannula  
 
07/13 0854  92  164/82     
 
07/13 0853  92  164/82     
 
07/13 0656 97.7 72 18 154/64  94 Nasal 50% 
 
       Cannula  
 
07/13 0114      94 Nasal 50% 
 
       Cannula  
 
07/12 2221      94 Nasal 50% 
 
       Cannula  
 
07/12 2209 99.0 84 20 120/70  98   
 
07/12 2134      90 Nasal 50% 
 
       Cannula  
 
07/12 1920      90 Nasal 50% 
 
       Cannula  
 
07/12 1601      94 Nasal 50% 
 
       Cannula  
 
07/12 1600      89 Nasal 50% 
 
       Cannula  
 
07/12 1443 99.1 90 28 154/70  93 Nasal  
 
       Cannula  
 
07/12 1414      95 Nasal 50% 
 
       Cannula  
 
07/12 1130      93 Nasal 55% 
 
       Cannula  
 
 
 Intake & Output
 
 
 07/13 1600 07/13 0800 07/13 0000 07/12 1600 07/12 0800 07/12 0000
 
Intake Total  100 111 500  161
 
Output Total  400 250  600 300
 
Balance  -300 -139 500 -600 -139
 
       
 
Intake, IV   11   11
 
Intake, Oral  100 100 500  150
 
Number  1 2 3 1 
 
Bowel      
 
Movements      
 
Output, Urine  400 250  600 300
 
Patient   150 lb   151 lb
 
Weight      
 
Weight   Bed scale   
 
Measurement      
 
Method      
 
 
 
Physical Exam:
General: Nontoxic, no apparent distress.
HEENT: Sclera and conjunctiva within normal limits, without xanthelasmas.
Neck: Carotids 2+ without bruits.
Respiratory: Scattered rhonchi and rales, air movement is decreased throughout, 
without accessory respiratory muscle use.
Heart: Regular rate and rhythm, without murmurs, without JVD.
Abdomen: Soft, nontender, no masses, normoactive bowel sounds.
Extremities: Without clubbing, cyanosis, without edema.
Neuro: Nonfocal exam, strength, 5 out of 5
Skin: Within normal limits without lesions.
Psych: Mood and affect: Normal
Current Medications:
 Current Medications
 
 
  Sig/Enrique Start time  Last
 
Medication Dose Route Stop Time Status Admin
 
Acetaminophen 650 MG Q8P PRN 07/08 2100 AC 
 
  PO   
 
Albuterol Sulfate 3 ML BID 07/09 2100 AC 07/13
 
  INH   1049
 
Alprazolam 0.5 MG BID 07/08 2100 AC 07/13
 
  PO 07/15 2059  0857
 
Aspirin 81 MG DAILY 07/09 0900 AC 07/13
 
  PO   0851
 
Atorvastatin Calcium 40 MG 1700 07/09 1700 AC 07/12
 
  PO   1708
 
Azithromycin 250 MG DAILY 07/11 0900 AC 07/13
 
  PO 07/13 2100  0854
 
Bisacodyl 5 MG DAILY 07/11 0930 DC 07/11
 
  PO   1031
 
Budesonide/ 2 PUF BID 07/09 1030 AC 07/13
 
Formoterol Fumarate  INH   0857
 
Docusate Sodium 100 MG BID 07/11 0930 DC 07/11
 
  PO   1030
 
Ezetimibe 10 MG DAILY 07/09 0900 AC 07/13
 
  PO   0854
 
Furosemide 20 MG DAILY 07/12 0900 AC 07/13
 
  PO   0851
 
Heparin Sodium  5,000 UNIT Q8 07/08 2200 AC 07/13
 
(Porcine)  SC   0547
 
Insulin Aspart 0 TIDAC 07/12 1700 AC 07/13
 
  SC   0850
 
Levothyroxine Sodium 0.05 MG 0700 07/10 0700 AC 07/13
 
  PO   0547
 
Methylprednisolone 40 MG Q12 07/09 2100 AC 07/13
 
  IV   0832
 
Metoprolol Succinate 50 MG DAILY 07/09 0900 AC 07/13
 
  PO   0854
 
Mirtazapine 15 MG AT BEDTIME 07/09 0015 AC 07/12
 
  PO   2139
 
Omeprazole 40 MG DAILY AC 07/10 0700 AC 07/13
 
  PO   0547
 
Ranolazine 1,000 MG BID 07/09 0002 AC 07/13
 
  PO   0853
 
Senna/Docusate Sodium 1 TAB AT BEDTIME AS NEED.. 07/12 1133 AC 
 
  PO   
 
Senna/Docusate Sodium 1 TAB AT BEDTIME 07/08 2100 DC 07/10
 
  PO   2005
 
 
 
 
Results
Last 48 Hrs of Labs/Mics:
 Laboratory Tests
 
07/13/18 0655:
Anion Gap 12, Estimated GFR 30  L, BUN/Creatinine Ratio 30.0  H, CBC w Diff MAN 
DIFF ORDERED, RBC 3.36  L, MCV 82.6, MCH 26.1  L, MCHC 31.6  L, RDW 19.7  H, MPV
8.9, Segmented Neutrophils 86  H, Band Neutrophils 2, Lymphocytes 7  L, 
Monocytes 5, Nucleated RBCs 3  H, Platelet Estimate INCREASED, Polychromasia 2+,
Hypochromic-Microcytic 3+, Poikilocytosis 2+, Anisocytosis 2+, Ovalocytes 1+
 
07/12/18 0825:
Anion Gap 15, Estimated GFR 30  L, BUN/Creatinine Ratio 26.5  H, CBC w Diff MAN 
DIFF ORDERED, RBC 3.48  L, MCV 82.8, MCH 26.0  L, MCHC 31.4  L, RDW 20.4  H, MPV
8.7, Segmented Neutrophils 87  H, Lymphocytes 4  L, Monocytes 5, Metamyelocytes 
2  H, Myelocytes 2  H, Nucleated RBCs 6  H, Platelet Estimate VERIFIED BY SMEAR,
Polychromasia 1+, Hypochromic-Microcytic 1+, Poikilocytosis 1+, Anisocytosis 1+,
Ovalocytes FEW
 
 
Assessment/Plan
Assessment/Plan
68-year-old woman with a past medical history of coronary artery disease (PCI in
2001 in 2014, as well as drug-eluting stents to the RCA in 2016) COPD, social 
lung disease, lung CA status post gamma knife radiation, paroxysmal atrial 
fibrillation (not on anticoagulation secondary to GI bleeds and anemia), chronic
kidney disease and hypertension.  She presents to our hospital with symptoms of 
increasing severe dyspnea and weakness, which has been refractory to her rescue 
asthma inhalers.  She has well has a noted approximate 10 pound weight gain over
the past several weeks.
 
Dyspnea:
The patient presents with dyspnea which is most consistent with a combined 
underlying COPD exacerbation and likely increasing fluid overload.  A recent 
echocardiogram performed demonstrated preserved LV systolic function, and a 
repeat of the same is currently not necessary.  
We will continue treatment aggressively as per pulmonary.  I would attempt to 
maintain an overall net fluid balance; however, monitor renal function closely
 
Anemia:
The patient has been receiving Procrit for her chronic anemia.  She feels 
overall improved following a transfusion
 
Atrial fibrillation:
Patient has known atrial fibrillation and is not anticoagulated secondary to a 
history of GI bleeds and anemia.  We will continue to maintain rate control.  
Amiodarone has been discontinued due to potential pulmonary effects.
Continue telemetry? Yes

## 2018-07-14 VITALS — SYSTOLIC BLOOD PRESSURE: 137 MMHG | DIASTOLIC BLOOD PRESSURE: 68 MMHG

## 2018-07-14 VITALS — SYSTOLIC BLOOD PRESSURE: 110 MMHG | DIASTOLIC BLOOD PRESSURE: 60 MMHG

## 2018-07-14 VITALS — SYSTOLIC BLOOD PRESSURE: 130 MMHG | DIASTOLIC BLOOD PRESSURE: 56 MMHG

## 2018-07-14 LAB
ERYTHROCYTE [DISTWIDTH] IN BLOOD BY AUTOMATED COUNT: 20.7 % (ref 11.5–14.5)
HCT VFR BLD CALC: 28 % (ref 37–47)
MCH RBC QN AUTO: 25.9 PG (ref 27–31)
MCHC RBC AUTO-ENTMCNC: 31.4 G/DL (ref 33–37)
MCV RBC AUTO: 82.5 FL (ref 81–99)
PLATELET # BLD: 373 /CUMM (ref 130–400)
PMV BLD AUTO: 9.3 FL (ref 7.4–10.4)
RED BLOOD CELL CT: 3.39 /CUMM (ref 4.2–5.4)
WBC # BLD AUTO: 22.2 /CUMM (ref 4.8–10.8)

## 2018-07-14 NOTE — PN- CRCU
Subjective
HPI/Critical Care Issues:
The patient was transferred from telemetry to the critical care unit for closer 
monitoring in the setting of significant oxygen desaturation.  The patient is 
awake and alert.  She remains comfortable, on high flow nasal cannula.  She 
remains extremely anxious.  She has a dry cough but is having difficulty with 
expectoration.  Her saturations are in the mid 90s at the present time.  She is 
afebrile.  She denies any chest pain, abdominal pain, nausea, vomiting, fever or
chills.  She continues to have diarrhea following her aggressive bowel regimen.
 
Objective
Current Medications:
 Current Medications
 
 
  Sig/Enrique Start time  Last
 
Medication Dose Route Stop Time Status Admin
 
Acetaminophen 650 MG Q8P PRN 07/08 2100 AC 
 
  PO   
 
Albuterol Sulfate 3 ML BID 07/09 2100 AC 07/14
 
  INH   0922
 
Alprazolam 0.5 MG ONCE ONE 07/13 1630 DC 07/13
 
  PO 07/13 1631  1641
 
Alprazolam 0.5 MG BID 07/08 2100 AC 07/14
 
  PO 07/15 2059  0745
 
Aspirin 81 MG DAILY 07/09 0900 AC 07/14
 
  PO   0858
 
Atorvastatin Calcium 40 MG 1700 07/09 1700 AC 07/13
 
  PO   1641
 
Azithromycin 250 MG DAILY 07/11 0900 DC 07/13
 
  PO 07/13 2100  0854
 
Budesonide/ 2 PUF BID 07/09 1030 AC 07/14
 
Formoterol Fumarate  INH   0858
 
Ezetimibe 10 MG DAILY 07/09 0900 AC 07/14
 
  PO   0859
 
Furosemide 20 MG DAILY 07/12 0900 AC 07/14
 
  PO   0858
 
Heparin Sodium  5,000 UNIT Q8 07/08 2200 AC 07/14
 
(Porcine)  SC   0546
 
Insulin Aspart 0 TIDAC 07/12 1700 AC 07/14
 
  SC   0858
 
Levothyroxine Sodium 0.05 MG 0700 07/10 0700 AC 07/14
 
  PO   0547
 
Methylprednisolone 40 MG Q12 07/09 2100 AC 07/14
 
  IV   0859
 
Metoprolol Succinate 50 MG DAILY 07/09 0900 AC 07/14
 
  PO   0858
 
Mirtazapine 15 MG AT BEDTIME 07/09 0015 AC 07/13
 
  PO   2122
 
Omeprazole 40 MG DAILY AC 07/10 0700 AC 07/14
 
  PO   0546
 
Ranolazine 1,000 MG BID 07/09 0002 AC 07/14
 
  PO   0858
 
Senna/Docusate Sodium 1 TAB AT BEDTIME AS NEED.. 07/12 1133 AC 
 
  PO   
 
 
 
 
Vital Signs & I&O
Last 24 Hrs of Vitals and I&O:
 Vital Signs
 
 
Date Time Temp Pulse Resp B/P B/P Pulse O2 O2 Flow FiO2
 
     Mean Ox Delivery Rate 
 
07/14 0925      99 Nasal 50% 
 
       Cannula  
 
07/14 0858  79  144/70     
 
07/14 0858  79  144/70     
 
07/14 0701 98.2 74 22 110/60  97   
 
07/14 0110      96 Nasal 50% 
 
       Cannula  
 
07/14 0000       Nasal 50% 
 
       Cannula  
 
07/13 2304 98.0 86 24 120/60  89   
 
07/13 2122  84  142/86     
 
07/13 2033      94 Nasal 50% 
 
       Cannula  
 
07/13 1451 98.2 84 24 140/60  95 Nasal  
 
       Cannula  
 
07/13 1101      92 Nasal 50% 
 
       Cannula  
 
 
 Intake & Output
 
 
 07/14 1600 07/14 0800 07/14 0000
 
Intake Total  120 120
 
Output Total  600 400
 
Balance  -480 -280
 
    
 
Intake, Oral  120 120
 
Number  1 1
 
Bowel   
 
Movements   
 
Output, Urine  600 400
 
Patient   152 lb
 
Weight   
 
 
Physical Exam
General Appearance: alert, awake, very anxious, comfortable
Head: atraumatic, normal appearance
Neck: supple
Respiratory: decreased breath sounds, wheezing, bibasilar fine crackles
Cardiovascular: S1 and S2 heard
Gastrointestinal: normal bowel sounds, soft, non-tender
Extremities: no edema
Skin: intact, normal color, warm/dry
 
Results
Last 24 Hrs of Lab Results:
 Laboratory Tests
 
07/14/18 0630:
Anion Gap 12, Estimated GFR 30  L, BUN/Creatinine Ratio 33.5  H, CBC w Diff MAN 
DIFF ORDERED, RBC 3.39  L, MCV 82.5, MCH 25.9  L, MCHC 31.4  L, RDW 20.7  H, MPV
9.3, Segmented Neutrophils Pending
 
 
Impression/Plan
 
Impression/Plan
Impression/Plan:
1.  Acute exacerbation of COPD.  CT scan demonstrates very severe emphysema, and
interstitial pneumonitis which could explain progressive respiratory failure as 
well.  
2.  Hypoxemic respiratory failure.  No evidence of pulmonary embolism on VQ 
scan.
3.  Lingular moderate to poorly differentiated squamous cell carcinoma - not yet
treated.
4.  Paroxysmal atrial fibrillation, not on anticoagulation secondary to previous
GI bleed and anemia.
5.  Chronic kidney disease stage 3 secondary to hypertensive nephrosclerosis.  
Creatinine stable.
6.  History of hypertension.
7.  Anemia without evidence of active bleeding.
8.  Previous history of lung cancer status post right sided gamma knife 
radiation.
9.  Hyperkalemia.
10.  Severe anxiety.
11.  Increasing white count which may be related to steroids.
Recommendations:
* Check stool for C. difficile -please send today.
* Continue to hold bowel regimen.
* TRC, neb treatments to continue.
* Continue Symbicort 2 puffs every 12 hours.
* Wean supplemental oxygen down to saturations around 90-92%.
* Decrease Solu-Medrol to 20 mg every 12 hours.
* Discussed the possibility of administering Kayexalate with nephrology.
* Avoid high potassium foods as specified by nephrology.
* Continue Lasix 20 mg daily.
* Continue low-dose Xanax for anxiety.
* Consider giving Xanax 0.25 mg as needed around 3 PM, being careful to avoid 
oversedation.
* Please request a psychiatry consult for assistance in management of depression
and anxiety.  
* Continue insulin sliding scale.
* Continue to follow cardiology input.
* DVT prophylaxis at all times -on subcu heparin.
* [place] evaluation to be requested once again.
* Continue to monitor in the critical care unit.

## 2018-07-14 NOTE — PN- CARDIOLOGY
Subjective
Subjective:
Stable at the present time.  Transferred to the ICU earlier today for increased 
dyspnea with low oxygen saturations.  Clinically improved at the present time.
 
Objective
Vital Signs and I&Os
Vital Signs
 
 
Date Time Temp Pulse Resp B/P B/P Pulse O2 O2 Flow FiO2
 
     Mean Ox Delivery Rate 
 
07/14 0925      99 Nasal 50% 
 
       Cannula  
 
07/14 0858  79  144/70     
 
07/14 0858  79  144/70     
 
07/14 0800      97 Nasal 50% 
 
       Cannula  
 
07/14 0701 98.2 74 22 110/60  97   
 
07/14 0110      96 Nasal 50% 
 
       Cannula  
 
07/14 0000       Nasal 50% 
 
       Cannula  
 
07/13 2304 98.0 86 24 120/60  89   
 
07/13 2122  84  142/86     
 
07/13 2033      94 Nasal 50% 
 
       Cannula  
 
07/13 1451 98.2 84 24 140/60  95 Nasal  
 
       Cannula  
 
 
 Intake & Output
 
 
 07/14 1600 07/14 0800 07/14 0000 07/13 1600 07/13 0800 07/13 0000
 
Intake Total  120 120 510 100 111
 
Output Total  600 400 600 400 250
 
Balance  -480 -280 -90 -300 -139
 
       
 
Intake, IV    10  11
 
Intake, Oral  120 120 500 100 100
 
Number  1 1 2 1 2
 
Bowel      
 
Movements      
 
Output, Urine  600 400 600 400 250
 
Patient   152 lb 150 lb  150 lb
 
Weight      
 
Weight      Bed scale
 
Measurement      
 
Method      
 
 
 
Current Medications:
 Current Medications
 
 
  Sig/Enrique Start time  Last
 
Medication Dose Route Stop Time Status Admin
 
Acetaminophen 650 MG Q8P PRN 07/08 2100 AC 
 
  PO   
 
Albuterol Sulfate 3 ML BID 07/09 2100 AC 07/14
 
  INH   0922
 
Alprazolam 0.5 MG ONCE ONE 07/13 1630 DC 07/13
 
  PO 07/13 1631  1641
 
Alprazolam 0.5 MG BID 07/08 2100 AC 07/14
 
  PO 07/15 2059  0745
 
Aspirin 81 MG DAILY 07/09 0900 AC 07/14
 
  PO   0858
 
Atorvastatin Calcium 40 MG 1700 07/09 1700 AC 07/13
 
  PO   1641
 
Azithromycin 250 MG DAILY 07/11 0900 DC 07/13
 
  PO 07/13 2100  0854
 
Budesonide/ 2 PUF BID 07/09 1030 AC 07/14
 
Formoterol Fumarate  INH   0858
 
Ezetimibe 10 MG DAILY 07/09 0900 AC 07/14
 
  PO   0859
 
Furosemide 20 MG DAILY 07/12 0900 AC 07/14
 
  PO   0858
 
Heparin Sodium  5,000 UNIT Q8 07/08 2200 AC 07/14
 
(Porcine)  SC   0546
 
Insulin Aspart 0 TIDAC 07/12 1700 AC 07/14
 
  SC   0858
 
Levothyroxine Sodium 0.05 MG 0700 07/10 0700 AC 07/14
 
  PO   0547
 
Methylprednisolone 40 MG Q12 07/09 2100 AC 07/14
 
  IV   0859
 
Metoprolol Succinate 50 MG DAILY 07/09 0900 AC 07/14
 
  PO   0858
 
Mirtazapine 15 MG AT BEDTIME 07/09 0015 AC 07/13
 
  PO   2122
 
Omeprazole 40 MG DAILY AC 07/10 0700 AC 07/14
 
  PO   0546
 
Ranolazine 1,000 MG BID 07/09 0002 AC 07/14
 
  PO   0858
 
Senna/Docusate Sodium 1 TAB AT BEDTIME AS NEED.. 07/12 1133 AC 
 
  PO   
 
 
 
 
Results
Last 48 Hrs of Labs/Mics:
 Laboratory Tests
 
07/14/18 0630:
Anion Gap 12, Estimated GFR 30  L, BUN/Creatinine Ratio 33.5  H, CBC w Diff MAN 
DIFF ORDERED, RBC 3.39  L, MCV 82.5, MCH 25.9  L, MCHC 31.4  L, RDW 20.7  H, MPV
9.3, Segmented Neutrophils Pending
 
07/13/18 0655:
Anion Gap 12, Estimated GFR 30  L, BUN/Creatinine Ratio 30.0  H, CBC w Diff MAN 
DIFF ORDERED, RBC 3.36  L, MCV 82.6, MCH 26.1  L, MCHC 31.6  L, RDW 19.7  H, MPV
8.9, Segmented Neutrophils 86  H, Band Neutrophils 2, Lymphocytes 7  L, 
Monocytes 5, Nucleated RBCs 3  H, Platelet Estimate INCREASED, Polychromasia 2+,
Hypochromic-Microcytic 3+, Poikilocytosis 2+, Anisocytosis 2+, Ovalocytes 1+
 
 
Assessment/Plan
Assessment/Plan
Assessment:
1.  Worsening dyspnea with associated hypoxemia-likely related to underlying 
pulmonary issues
2.  History of coronary artery disease, status post intervention in 2001 and 
2014 with drug-eluting stents to the right coronary in 2016
3.  History of lung cancer, status post gamma knife radiation
4.  History of paroxysmal atrial fibrillation, not on anticoagulant therapy due 
to GI bleeding and anemia
4.  Chronic renal insufficiency
5.  Hypertension
 
Recommend additions:
-Continue as per the medical/ICU team
-In view of worsening hypoxia this morning and ICU transfer, consider follow-up 
chest x-ray to reassess for any evidence of volume overload
-Keep on telemetry monitoring
-Otherwise continue current cardiac medications
-Pending chest x-ray, continue current diuresis with low-dose oral Lasix.
Continue telemetry? Yes

## 2018-07-14 NOTE — PN- RESIDENT CRCU
Subjective
HPI/CRCU Issues:
67 y/o F with a PMH of CAD, HTN, HLD, Squamous lung cell Ca, CKD, and paroxysmal
afib not on anticoagulation due to previous GI bleed that presented to the ED c/
o progressive dyspnea after not achieving relief using her rescue inhaler. She 
was admitted to the telemetry unit with an admitting diagnosis of COPD 
exacerbation and subsequently transfered to the ICU for closer monitoring due to
her desaturation.  
24 Hour Events:
Pt seen and examined at bedside. She was talkative, cooperative. She has no 
acute complains, but appeared nervy with slightly tremolous hands. She defined 
herself as a "anxious person" that usually contributes and exacerbates her 
feeling of SOB. She also notes the appereance of loose stools since two days 
ago.  
 
Objective
 
Vital Signs & I&O
Last 8 Hrs of Vitals and I&O:
 Intake & Output
 
 
  1600  0800  0000
 
Intake Total  120 120
 
Output Total  600 400
 
Balance  -480 -280
 
    
 
Intake, Oral  120 120
 
Number  1 1
 
Bowel   
 
Movements   
 
Output, Urine  600 400
 
Patient   152 lb
 
Weight   
 
 
 Laboratory Tests
 
 
 
 
 0630
 
Chemistry 
 
  Sodium (137 - 145 mmol/L) 139
 
  Potassium (3.5 - 5.1 mmol/L) 5.6  H
 
  Chloride (98 - 107 mmol/L) 104
 
  Carbon Dioxide (22 - 30 mmol/L) 23
 
  Anion Gap (5 - 16) 12
 
  BUN (7 - 17 mg/dL) 57  H
 
  Creatinine (0.5 - 1.0 mg/dL) 1.7  H
 
  Estimated GFR (>60 ml/min) 30  L
 
  BUN/Creatinine Ratio (7 - 25 %) 33.5  H
 
Hematology 
 
  CBC w Diff MAN DIFF ORDERED
 
  WBC (4.8 - 10.8 /CUMM) 22.2  H
 
  RBC (4.20 - 5.40 /CUMM) 3.39  L
 
  Hgb (12.0 - 16.0 G/DL) 8.8  L
 
  Hct (37 - 47 %) 28.0  L
 
  MCV (81.0 - 99.0 FL) 82.5
 
  MCH (27.0 - 31.0 PG) 25.9  L
 
  MCHC (33.0 - 37.0 G/DL) 31.4  L
 
  RDW (11.5 - 14.5 %) 20.7  H
 
  Plt Count (130 - 400 /CUMM) 373
 
  MPV (7.4 - 10.4 FL) 9.3
 
  Segmented Neutrophils (42.2 - 75.2 %) 89  H
 
  Band Neutrophils (0.0 - 5.0 %) 1
 
  Lymphocytes (20.5 - 51.1 %) 5  L
 
  Monocytes (1.7 - 9.3 %) 5
 
  Nucleated RBCs (0.0 - 0.0 /100WBC) 3  H
 
  Platelet Estimate (ADEQUATE) VERIFIED BY SMEAR
 
  Polychromasia 1+
 
  Anisocytosis 1+
 
 
Vital Signs
 
 
Date Time Temp Pulse Resp B/P B/P Pulse O2 O2 Flow FiO2
 
     Mean Ox Delivery Rate 
 
 1212      94 Nasal 50% 
 
       Cannula  
 
 1200      97 Nasal 50% 
 
       Cannula  
 
 0925      99 Nasal 50% 
 
       Cannula  
 
 0858  79  144/70     
 
 0858  79  144/70     
 
 0800      97 Nasal 50% 
 
       Cannula  
 
 0701 98.2 74 22 110/60  97   
 
 0110      96 Nasal 50% 
 
       Cannula  
 
 0000       Nasal 50% 
 
       Cannula  
 
 2304 98.0 86 24 120/60  89   
 
 2122  84  142/86     
 
 2033      94 Nasal 50% 
 
       Cannula  
 
 1451 98.2 84 24 140/60  95 Nasal  
 
       Cannula  
 
 
 
 
Exam
General Appearance: no apparent distress, alert, awake, anxious
Head: atraumatic, normal appearance
Neck: normal inspection, supple
Respiratory: chest non-tender, decreased breath sounds, crackles, rhonchi
Cardiovascular: regular rate/rhythm
Gastrointestinal: normal bowel sounds, soft, non-tender, no organomegaly
Extremities: normal inspection, normal capillary refill, no edema
Cranial Nerves: normal hearing, normal speech
Current Medications:
 Current Medications
 
 
  Sig/Enrique Start time  Last
 
Medication Dose Route Stop Time Status Admin
 
Acetaminophen 650 MG Q8P PRN 2100 AC 
 
  PO   
 
Albuterol Sulfate 3 ML BID 2100 AC 
 
  INH   0922
 
Alprazolam 0.5 MG ONCE ONE  163 DC 
 
  PO  1631  1641
 
Alprazolam 0.5 MG BID 2100 AC 
 
  PO 07/15 2059  0745
 
Aspirin 81 MG DAILY 900 AC 
 
  PO   0858
 
Atorvastatin Calcium 40 MG 1700  1700 AC 
 
  PO   1641
 
Azithromycin 250 MG DAILY 900 DC 
 
  PO  2100  0854
 
Budesonide/ 2 PUF BID  1030 AC 
 
Formoterol Fumarate  INH   0858
 
Ezetimibe 10 MG DAILY  0900 AC 
 
  PO   0859
 
Furosemide 20 MG DAILY  0900 AC 
 
  PO   0858
 
Heparin Sodium  5,000 UNIT Q8  2200 AC 
 
(Porcine)  SC   1244
 
Insulin Aspart 0 TIDAC  1700 AC 
 
  SC   1244
 
Levothyroxine Sodium 0.05 MG 0700 07/10 0700 AC 
 
  PO   0547
 
Methylprednisolone 20 MG Q12  2100 AC 
 
  IV   
 
Methylprednisolone 40 MG Q12  2100 DC 
 
  IV   0859
 
Metoprolol Succinate 50 MG DAILY 900 AC 
 
  PO   0858
 
Mirtazapine 15 MG AT BEDTIME  0015 AC 
 
  PO   2122
 
Omeprazole 40 MG DAILY AC 07/10 07 AC 
 
  PO   0546
 
Ranolazine 1,000 MG BID  0002 AC 
 
  PO   0858
 
Senna/Docusate Sodium 1 TAB AT BEDTIME AS NEED..  1133 AC 
 
  PO   
 
 
 
 
Impression/Plan
 
Impression/Problem List
Impression:
67 y/o F with a PMH of CAD, HTN, HLD, Squamous lung cell Ca, CKD, and paroxysmal
afib not on anticoagulation due to previous GI bleed that presented to the ED c/
o progressive dyspnea after not achieving relief using her rescue inhaler. She 
was admitted to the telemetry unit with an admitting diagnosis of COPD 
exacerbation and subsequently transfered to the ICU for closer monitoring due to
her desaturation on .  
 
ASSESSMENT AND PLAN
Impression
End stage COPD/COPD Exacerbation
Chronic anemia due to CKD
Paroxysmal atrial fibrillation
HTN
CKD stage III 2/2 hypertensive nephrosclerosis
Anxiety
Squamous cell lung ca
 
End-stage COPD / COPD exacerbation/ Hypoxemic respiratory failure
Pt with a chest CT () showing diffuse pulmonary emphysema with chronic 
interstitial pneumonitis. Prior admit show similar complains of SOB - her 
symptoms are most likely related to her severe emphysematous picture that is 
worsened by her heightened state of anxiety that induces hyperventilation as per
pt. She was started on ceftriaxone and azithromycin with methylprednisolone 40 
mg with moderate improvemenet on her O2 sat. Unclear if there is a fluid 
overload component, f/u CXR ordered to reassess. Currently patient is not in 
respiratory distress with 94% O2 sat on high flow 50% O2.
-TRC/Continue neb treatments
-Symbicort 2 puffs q12
-Decreased methylprednisolone from 40 mg to 20mg
-F/u CXR
 
Paroxysmal A-fib / HTN
Pt currently off anticoagulation due to episodes of GI bleeding and chronic 
anemia due to CKD. Her BP has been controlled on metoprolol, cardiology is 
following. 
-Off anticoag 
-Metoprolol 50 mg qDaily
-Continue ASA/Statin/Ezetimibe
 
Chronic Anemia due to CKD
Pt with Hb 8.8, baseline around 8-10. Most likely related to CKD rather than 
massive acute blood loss; though guaic of stools have been positive since .
Will need further workup to determine exact cause. She is on EPO at home, 1 dose
was given during her admission. As per nephro's recommendation, no further 
dosages needed at this time.
-pt off procrit
-f/u CBC
 
CKD stage III 2/2 hypertensive nephrosclerosis
Pt being followed by Dr. Chacon, currently with K at 5.6, with a baselien Cr 
1.5-2.2. Adding kayexalate will be considered if K trends upward. For now, avoid
very high potassium food. 
-<2g K in diet
-Continue trending
 
 
Anxiety
Pt described herself as a very anxious person, especially with regards to her 
overall health.  She is currently on Alprazolam 0.5 mg at home. As per the pt, 
being in the ICU has increased her anxiety and feels discomfort as a result. 
-Continue psych meds careful to avoid oversedation
-psych consult
 
Squamous cell lung CA
S/p post gamma knife radiation, followed by Dr. Corcoran - latest visit received a
referral to an oncologist to evaluate a new left-sided lung mass that was 
positive for malignancy on biopsy. 
 
 
DNR
DVT PPX: ALPS, sq heparin
Problem List:
 1. Anxiety
 
 2. COPD (chronic obstructive pulmonary disease)
 
Pain Ratin
Tomorrow's Labs & Rationales:
cxr
 
Plan
DVT/Prophylaxis: mechanical, pharmacological

## 2018-07-14 NOTE — EVENT NOTE
Event Note
Event Note:
Patient is desatting this morning to the 60s with ambulation. Patient is 
currently on high flow oxygen.
 
Patient will be transferred to the ICU for closer monitoring. Confirmed with Dr. Ochoa and Dr. Recinos.

## 2018-07-15 VITALS — DIASTOLIC BLOOD PRESSURE: 70 MMHG | SYSTOLIC BLOOD PRESSURE: 118 MMHG

## 2018-07-15 VITALS — SYSTOLIC BLOOD PRESSURE: 120 MMHG | DIASTOLIC BLOOD PRESSURE: 60 MMHG

## 2018-07-15 LAB
ERYTHROCYTE [DISTWIDTH] IN BLOOD BY AUTOMATED COUNT: 19.9 % (ref 11.5–14.5)
HCT VFR BLD CALC: 26.6 % (ref 37–47)
MCH RBC QN AUTO: 25.8 PG (ref 27–31)
MCHC RBC AUTO-ENTMCNC: 31.8 G/DL (ref 33–37)
MCV RBC AUTO: 81.1 FL (ref 81–99)
PLATELET # BLD: 351 /CUMM (ref 130–400)
PMV BLD AUTO: 8.9 FL (ref 7.4–10.4)
RED BLOOD CELL CT: 3.28 /CUMM (ref 4.2–5.4)
WBC # BLD AUTO: 24.6 /CUMM (ref 4.8–10.8)

## 2018-07-15 NOTE — PN- CRCU
Subjective
HPI/Critical Care Issues:
The patient is awake and alert.  She feels slowly improved overall.  She has 
significant shortness of breath with any exertion.  She has difficulty 
expectorating.  She is afebrile.  She denies any chills, chest pain, abdominal 
pain or any discomfort.  The patient is frustrated over the length of time it is
taking her to improve.  She remains very anxious.  Her oxygen saturations are in
the high 90s on 50% high flow.
 
Objective
Current Medications:
 Current Medications
 
 
  Sig/Enrique Start time  Last
 
Medication Dose Route Stop Time Status Admin
 
Acetaminophen 650 MG Q8P PRN 07/08 2100 AC 
 
  PO   
 
Albuterol Sulfate 3 ML BID 07/09 2100 AC 07/15
 
  INH   0912
 
Alprazolam 0.5 MG BID 07/08 2100 AC 07/14
 
  PO 07/15 2059  2206
 
Aspirin 81 MG DAILY 07/09 0900 AC 07/15
 
  PO   0822
 
Atorvastatin Calcium 40 MG 1700 07/09 1700 AC 07/14
 
  PO   1737
 
Budesonide/ 2 PUF BID 07/09 1030 AC 07/15
 
Formoterol Fumarate  INH   0823
 
Ezetimibe 10 MG DAILY 07/09 0900 AC 07/15
 
  PO   0823
 
Furosemide 20 MG DAILY 07/12 0900 AC 07/15
 
  PO   0823
 
Heparin Sodium  5,000 UNIT Q8 07/08 2200 AC 07/15
 
(Porcine)  SC   0521
 
Insulin Aspart 0 TIDAC 07/12 1700 AC 07/15
 
  SC   0822
 
Levothyroxine Sodium 0.05 MG 0700 07/10 0700 AC 07/15
 
  PO   0521
 
Methylprednisolone 20 MG Q12 07/14 2100 AC 07/15
 
  IV   0827
 
Methylprednisolone 40 MG Q12 07/09 2100 DC 07/14
 
  IV   0859
 
Metoprolol Succinate 50 MG DAILY 07/09 0900 AC 07/15
 
  PO   0823
 
Mirtazapine 15 MG AT BEDTIME 07/09 0015 AC 07/14
 
  PO   2216
 
Omeprazole 40 MG DAILY AC 07/10 0700 AC 07/15
 
  PO   0522
 
Ranolazine 1,000 MG BID 07/09 0002 AC 07/15
 
  PO   0822
 
Senna/Docusate Sodium 1 TAB AT BEDTIME AS NEED.. 07/12 1133 AC 
 
  PO   
 
 
 
 
Vital Signs & I&O
Last 24 Hrs of Vitals and I&O:
 Vital Signs
 
 
Date Time Temp Pulse Resp B/P B/P Pulse O2 O2 Flow FiO2
 
     Mean Ox Delivery Rate 
 
07/15 0823  75  130/62     
 
07/15 0822  75  130/62     
 
07/15 0800      93 Nasal 50% 
 
       Cannula  
 
07/15 0800 97.2 72 26 118/70  93 Nasal 50% 
 
       Cannula  
 
07/15 0433      98 Nasal 50% 
 
       Cannula  
 
07/15 0203      93 Nasal 50% 
 
       Cannula  
 
07/15 0000      94 Nasal 50% 
 
       Cannula  
 
07/14 2214  81  150/72     
 
07/14 2200 97.0 81 22 137/68  94 Nasal 50% 
 
       Cannula  
 
07/14 2002      94 Nasal 50% 
 
       Cannula  
 
07/14 2000      96 Nasal 50% 
 
       Cannula  
 
07/14 1630      94 Nasal 50% 
 
       Cannula  
 
07/14 1600      96 Nasal 50% 
 
       Cannula  
 
07/14 1600 98.1 77 23 130/56  95 Nasal 50% 
 
       Cannula  
 
07/14 1426      94 Nasal 50% 
 
       Cannula  
 
07/14 1212      94 Nasal 50% 
 
       Cannula  
 
07/14 1200      97 Nasal 50% 
 
       Cannula  
 
07/14 0925      99 Nasal 50% 
 
       Cannula  
 
 
 Intake & Output
 
 
 07/15 1600 07/15 0800 07/15 0000
 
Intake Total  200 400
 
Output Total  400 900
 
Balance  -200 -500
 
    
 
Intake, Oral  200 400
 
Number   1
 
Bowel   
 
Movements   
 
Output, Urine  400 900
 
 
Physical Exam
General Appearance: alert, awake, very anxious, comfortable
Head: atraumatic, normal appearance
Neck: supple
Respiratory: decreased breath sounds, wheezing, bibasilar fine crackles
Cardiovascular: S1 and S2 heard
Gastrointestinal: normal bowel sounds, soft, non-tender
Extremities: no edema
Skin: intact, normal color, warm/dry
 
Results
Last 24 Hrs of Lab Results:
 Laboratory Tests
 
07/15/18 0530:
Anion Gap 12, Estimated GFR 23  L, BUN/Creatinine Ratio 29.5  H, CBC w Diff MAN 
DIFF ORDERED, RBC 3.28  L, MCV 81.1, MCH 25.8  L, MCHC 31.8  L, RDW 19.9  H, MPV
8.9, Segmented Neutrophils 95  H, Band Neutrophils 1, Lymphocytes 4  L, Platelet
Estimate ADEQUATE, Polychromasia 1+, Hypochromic-Microcytic 1+, Basophilic 
Stippling SLIGHT, Anisocytosis 1+
 
Last 24 Hrs of Micro Results:
All cultures are negative to date.
 
Diagnostic Data
CXR Findings:
1. Extensive obstructive lung disease with fibrotic changes in the mid and lower
lung bilaterally. No superimposed focal acute process is seen.
Specifically, no evidence of pulmonary edema is seen.
2. Slight indistinctness of the CP angles is noted, likely due to atelectatic 
change, though trace pleural effusions cannot be completely excluded.
 1. Extensive obstructive lung disease with fibrotic changes in the mid and 
lower lung bilaterally. No superimposed focal acute process is seen.
Specifically, no evidence of pulmonary edema is seen.
2. Slight indistinctness of the CP angles is noted, likely due to atelectatic 
change, though trace pleural effusions cannot be completely excluded.
 
 
Impression/Plan
 
Impression/Plan
Impression/Plan:
1.  Acute exacerbation of COPD.  CT scan demonstrates very severe emphysema, and
interstitial pneumonitis which could explain progressive respiratory failure as 
well.  Superimposed infection needs to be excluded.  Chest x-ray does not 
demonstrate this however her chest x-ray is difficult to interpret.  The patient
's respiratory status is not improving which could suggest an underlying 
infectious process given her elevated white blood cell count.
2.  Hypoxemic respiratory failure.  No evidence of pulmonary embolism on VQ 
scan.
3.  Lingular moderate to poorly differentiated squamous cell carcinoma - not yet
treated.
4.  Paroxysmal atrial fibrillation, not on anticoagulation secondary to previous
GI bleed and anemia.
5.  Chronic kidney disease stage 3 secondary to hypertensive nephrosclerosis.  
Creatinine stable.
6.  History of hypertension.
7.  Anemia without evidence of active bleeding.
8.  Previous history of lung cancer status post right sided gamma knife 
radiation.
9.  Hyperkalemia.
10.  Severe anxiety.
11.  Increasing white count which may be related to steroids.  No significant 
increase in bands.
Recommendations:
* Repeat pancultures.
* Need to consider ID input if the patient's white blood cell count continues to
trend upwards.
* Monitor for infection.
* Give 1 dose of vancomycin today.
* Begin ceftazidime, and consider treating for gram-negative/Pseudomonas 
pneumonia.
* Will de-escalate therapy quickly to avoid the risk of C. difficile.
* We will attempt antibiotic therapy as the patient is not improving with 
respect to her oxygenation and respiratory status.
* TRC, neb treatments to continue.
* Continue Symbicort 2 puffs every 12 hours.
* Wean supplemental oxygen down to saturations around 90-92%.
* Solu-Medrol to 20 mg every 12 hours.
* Avoid high potassium foods as specified by nephrology.
* Continue Lasix 20 mg daily.
* Continue low-dose Xanax for anxiety.
* DVT prophylaxis at all times.
* Continue all supportive care.
* I discussed the plan of care with house staff and asked them to contact me 
should the patient's condition change or deteriorate.

## 2018-07-15 NOTE — PN- CARDIOLOGY
Objective
Vital Signs and I&Os
Vital Signs
 
 
Date Time Temp Pulse Resp B/P B/P Pulse O2 O2 Flow FiO2
 
     Mean Ox Delivery Rate 
 
07/15 1200      94 Nasal 50% 
 
       Cannula  
 
07/15 0912      96 Nasal 45% 
 
       Cannula  
 
07/15 0823  75  130/62     
 
07/15 0822  75  130/62     
 
07/15 0800      93 Nasal 50% 
 
       Cannula  
 
07/15 0800 97.2 72 26 118/70  93 Nasal 50% 
 
       Cannula  
 
07/15 0433      98 Nasal 50% 
 
       Cannula  
 
07/15 0203      93 Nasal 50% 
 
       Cannula  
 
07/15 0000      94 Nasal 50% 
 
       Cannula  
 
07/14 2214  81  150/72     
 
07/14 2200 97.0 81 22 137/68  94 Nasal 50% 
 
       Cannula  
 
07/14 2002      94 Nasal 50% 
 
       Cannula  
 
07/14 2000      96 Nasal 50% 
 
       Cannula  
 
07/14 1630      94 Nasal 50% 
 
       Cannula  
 
07/14 1600      96 Nasal 50% 
 
       Cannula  
 
07/14 1600 98.1 77 23 130/56  95 Nasal 50% 
 
       Cannula  
 
 
 Intake & Output
 
 
 07/15 1600 07/15 0800 07/15 0000 07/14 1600 07/14 0800 07/14 0000
 
Intake Total  200 400 700 120 120
 
Output Total  400 900 250 600 400
 
Balance  -200 -500 450 -480 -280
 
       
 
Intake, Oral  200 400 700 120 120
 
Number   1 1 1 1
 
Bowel      
 
Movements      
 
Output, Urine  400 900 250 600 400
 
Patient 135 lb   131 lb  152 lb
 
Weight      
 
Weight Bed scale   Bed scale  
 
Measurement      
 
Method      
 
 
 
Physical Exam:
General: Thin elderly female, alert and oriented, short of breath at rest
Head normocephalic atraumatic
Eyes sclera anicteric conjunctiva showed pallor extraocular muscles are normal 
Neck JVP normal, carotid upstrokes normal bilaterally
Chest lungs diffuse bilateral rhonchi
Heart regular rhythm with a 1-2/6 systolic murmur
Abdomen soft no organomegaly bowel sounds normal
Extremities no clubbing cyanosis or edema
Neurological no gross motor or sensory deficits
Current Medications:
 Current Medications
 
 
  Sig/Enrique Start time  Last
 
Medication Dose Route Stop Time Status Admin
 
Acetaminophen 650 MG Q8P PRN 07/08 2100 AC 
 
  PO   
 
Albuterol Sulfate 3 ML BID 07/09 2100 AC 07/15
 
  INH   0912
 
Alprazolam 0.5 MG BID 07/08 2100 AC 07/15
 
  PO 07/15 2059  1019
 
Aspirin 81 MG DAILY 07/09 0900 AC 07/15
 
  PO   0822
 
Atorvastatin Calcium 40 MG 1700 07/09 1700 AC 07/14
 
  PO   1737
 
Budesonide/ 2 PUF BID 07/09 1030 AC 07/15
 
Formoterol Fumarate  INH   0823
 
Ceftazidime 2,000 MG Q24H 07/15 1200 AC 07/15
 
  IV   1355
 
Ezetimibe 10 MG DAILY 07/09 0900 AC 07/15
 
  PO   0823
 
Furosemide 20 MG DAILY 07/12 0900 AC 07/15
 
  PO   0823
 
Heparin Sodium  5,000 UNIT Q8 07/08 2200 AC 07/15
 
(Porcine)  SC   1237
 
Insulin Aspart 0 TIDAC 07/12 1700 AC 07/15
 
  SC   1237
 
Levothyroxine Sodium 0.05 MG 0700 07/10 0700 AC 07/15
 
  PO   0521
 
Methylprednisolone 20 MG Q12 07/14 2100 AC 07/15
 
  IV   0827
 
Metoprolol Succinate 50 MG DAILY 07/09 0900 AC 07/15
 
  PO   0823
 
Mirtazapine 15 MG AT BEDTIME 07/09 0015 AC 07/14
 
  PO   2216
 
Omeprazole 40 MG DAILY AC 07/10 0700 AC 07/15
 
  PO   0522
 
Ranolazine 1,000 MG BID 07/09 0002 AC 07/15
 
  PO   0822
 
Senna/Docusate Sodium 1 TAB AT BEDTIME AS NEED.. 07/12 1133 AC 
 
  PO   
 
Vancomycin HCl 1,000 MG ONCE ONE 07/15 1045 DC 07/15
 
Sodium Chloride 250 ML IV 07/15 1144  1355
 
 
 
 
Results
Last 48 Hrs of Labs/Mics:
 Laboratory Tests
 
07/15/18 0530:
Anion Gap 12, Estimated GFR 23  L, BUN/Creatinine Ratio 29.5  H, CBC w Diff MAN 
DIFF ORDERED, RBC 3.28  L, MCV 81.1, MCH 25.8  L, MCHC 31.8  L, RDW 19.9  H, MPV
8.9, Segmented Neutrophils 95  H, Band Neutrophils 1, Lymphocytes 4  L, Platelet
Estimate ADEQUATE, Polychromasia 1+, Hypochromic-Microcytic 1+, Basophilic 
Stippling SLIGHT, Anisocytosis 1+
 
07/14/18 0630:
Anion Gap 12, Estimated GFR 30  L, BUN/Creatinine Ratio 33.5  H, CBC w Diff MAN 
DIFF ORDERED, RBC 3.39  L, MCV 82.5, MCH 25.9  L, MCHC 31.4  L, RDW 20.7  H, MPV
9.3, Segmented Neutrophils 89  H, Band Neutrophils 1, Lymphocytes 5  L, 
Monocytes 5, Nucleated RBCs 3  H, Platelet Estimate VERIFIED BY SMEAR, 
Polychromasia 1+, Anisocytosis 1+
 Microbiology
07/14 0840  UPPER RESP: Surveillance Culture - COMP
                METH RESIST STAPH AUREUS
07/14 0840  GI: Surveillance Culture - COMP
                VANC RESIST ENTEROCOCCUS
 
 
 
Assessment/Plan
Assessment/Plan
Assessment:
1.  Worsening dyspnea with associated hypoxemia-likely related to underlying 
pulmonary issues
2.  History of coronary artery disease, status post intervention in 2001 and 
2014 with drug-eluting stents to the right coronary in 2016
3.  History of lung cancer, status post gamma knife radiation
4.  History of paroxysmal atrial fibrillation, not on anticoagulant therapy due 
to GI bleeding and anemia
4.  Chronic renal insufficiency
5.  Hypertension
 
Recommend additions:
-Continue as per the medical/ICU team
-Follow-up chest x-ray showed no overt evidence of heart failure
-Keep on telemetry monitoring
-Otherwise continue current cardiac medications
-Continue gentle diuresis with p.o.
Continue telemetry? Yes

## 2018-07-15 NOTE — PN- RESIDENT CRCU
Subjective
HPI/CRCU Issues:
acute on chronic hypoxemic respiratory failure
end stage COPD
lung cancer
24 Hour Events:
No overnight events, afebrile with leukocytosis on steroids
Patient is complaining of significant shortness of breath but is more 
comfortable on high flow but becomes significantly short of breath and 
tachypneic with minimal activity, unable to produce any mucous from her cough
 
Objective
 
Vital Signs & I&O
Last 8 Hrs of Vitals and I&O:
Temp 97.2 HR 75 RR 26 /62 SpO2 93% on fio2 0.5 high flow nasal cannula
 
Exam
General Appearance: alert, awake, mild distress, tachypneic with minimal 
movement in bed
Respiratory: chest non-tender, decreased breath sounds, globally diminished 
breath sounds without wheezing or rhonchi
Cardiovascular: regular rate/rhythm, normal peripheral pulses
Gastrointestinal: normal bowel sounds, soft, non-tender
Current Medications:
 Current Medications
 
 
  Sig/Enrique Start time  Last
 
Medication Dose Route Stop Time Status Admin
 
Acetaminophen 650 MG Q8P PRN 2100 AC 
 
  PO   
 
Albuterol Sulfate 3 ML BID 2100 AC 07/15
 
  INH   0912
 
Alprazolam 0.5 MG BID 2100 AC 
 
  PO 07/15 2059  2206
 
Aspirin 81 MG DAILY 900 AC 07/15
 
  PO   0822
 
Atorvastatin Calcium 40 MG 1700  1700 AC 
 
  PO   1737
 
Budesonide/ 2 PUF BID  1030 AC 07/15
 
Formoterol Fumarate  INH   0823
 
Ezetimibe 10 MG DAILY  09 AC 07/15
 
  PO   0823
 
Furosemide 20 MG DAILY  09 AC 07/15
 
  PO   0823
 
Heparin Sodium  5,000 UNIT Q8  2200 AC 07/15
 
(Porcine)  SC   0521
 
Insulin Aspart 0 TIDAC  1700 AC 07/15
 
  SC   0822
 
Levothyroxine Sodium 0.05 MG 0700 07/10 0700 AC 07/15
 
  PO   0521
 
Methylprednisolone 20 MG Q12  2100 AC 07/15
 
  IV   0827
 
Methylprednisolone 40 MG Q12  2100 DC 
 
  IV   0859
 
Metoprolol Succinate 50 MG DAILY 900 AC 07/15
 
  PO   0823
 
Mirtazapine 15 MG AT BEDTIME  0015 AC 
 
  PO   2216
 
Omeprazole 40 MG DAILY AC 07/10 07 AC 07/15
 
  PO   0522
 
Ranolazine 1,000 MG BID  0002 AC 07/15
 
  PO   0822
 
Senna/Docusate Sodium 1 TAB AT BEDTIME AS NEED..  1133 AC 
 
  PO   
 
 
 
CXR Findings:
FINDINGS:
EKG leads are seen overlying the chest. The cardiomediastinal silhouette is
enlarged. Calcification of the aorta is seen.
 
Lungs are hyperinflated, consistent with patient's known history of COPD.
Superimposed reticular opacities are seen in the mid and lower lungs,
consistent with the CT demonstrated fibrotic changes, perhaps related to RB
ILD. The patient's known large lingular cancer is poorly appreciated on this
portable film in the medial left infrahilar region. No dense consolidation is
seen. There is some indistinctness of the CP angles, likely representing
atelectatic changes though trace pleural effusions could have a similar
appearance. No evidence of pulmonary edema is seen. No pneumothorax is seen.
 
Osteopenia is noted with mild convex right thoracolumbar scoliosis.
 
IMPRESSION:
 
1. Extensive obstructive lung disease with fibrotic changes in the mid and
lower lung bilaterally. No superimposed focal acute process is seen.
Specifically, no evidence of pulmonary edema is seen.
2. Slight indistinctness of the CP angles is noted, likely due to atelectatic
change, though trace pleural effusions cannot be completely excluded.
 
Impression/Plan
 
Impression/Problem List
Impression:
68 year old female with PMH of CAD, HTN, HLD, squamous lung cell cancer, CKD 
Stage IV, and paroxysmal atrial fibrillation not on anticoagulation due to 
previous GI bleed presented with complaints of dyspnea not relieved by home 
bronchodilators admitted for COPD exacerbation and transferred to ICU . She was 
admitted to the telemetry unit with an admitting diagnosis of COPD exacerbation 
and subsequently transfered to the ICU for desaturation and hypoxia despite high
flow oxygen on . 
 
Acute on chronic hypoxemic respiratory failure
 End stage COPD, currently exacerbated without bacterial infection
 Chest CT () showed diffuse pulmonary emphysema, chronic interstitial 
pneumonitis
 Did receive azithromycin and ceftriaxone, discontinued
 No infiltrates on imaging, unable to expectorate for sputum cultures, off 
antibiotics
 Leukocytosis secondary to steroid treatment, currently solumedrol 20mg iv q12h 
 Currently tachypneic, but comfortable on high flow 50% O2.
 TRC evaluation, Continue nebulized albuterol and ipatropium treatments
 Symbicort 2 puffs q12
 
Squamous cell lung cancer:
 Left lung mass biopsy positive
 S/p post gamma knife radiation
 Followed by Dr. Corcoran
 
Paroxysmal A-fib/CAD/HTN (SBP 110s-140s)
 Off anticoagulation
 Continue aspirin, statin, metoprolol, ranexa, and ezetimibe
 
Chronic Anemia:
 likely due to CKD
 on EPO at home
 Nephrology follow, no further epogen at this time
 CBC stable
 
CKD stage IV
 secondary to hypertensive nephrosclerosis
 Hyperkalemia 5.3
 Continue potassium restricted diet
 
Anxiety
 Continue Alprazolam 0.5 mg PO BID + prn
 
Hypothyroidism:
 Continue synthroid
 
Regular diet with potassium restriction, on sliding scale insulin for 
hyperglycemia with steroids
DVT ppx-heparin sc ALPs
DNR/DNI
Problem List:
 1. COPD (chronic obstructive pulmonary disease)
 
 2. CKD (chronic kidney disease) stage 4, GFR 15-29 ml/min
 
 3. PAF (paroxysmal atrial fibrillation)
 
 4. CAD (coronary artery disease)
 
 5. Lung cancer
 
 6. Decompensated COPD with exacerbation (chronic obstructive pulmonary disease 
)
 
Pain Ratin
Tomorrow's Labs & Rationales:
cbc, icu
 
Plan
DVT/Prophylaxis: mechanical, pharmacological

## 2018-07-16 VITALS — SYSTOLIC BLOOD PRESSURE: 118 MMHG | DIASTOLIC BLOOD PRESSURE: 62 MMHG

## 2018-07-16 VITALS — DIASTOLIC BLOOD PRESSURE: 50 MMHG | SYSTOLIC BLOOD PRESSURE: 100 MMHG

## 2018-07-16 VITALS — SYSTOLIC BLOOD PRESSURE: 130 MMHG | DIASTOLIC BLOOD PRESSURE: 60 MMHG

## 2018-07-16 VITALS — SYSTOLIC BLOOD PRESSURE: 125 MMHG | DIASTOLIC BLOOD PRESSURE: 57 MMHG

## 2018-07-16 LAB
ERYTHROCYTE [DISTWIDTH] IN BLOOD BY AUTOMATED COUNT: 20.5 % (ref 11.5–14.5)
HCT VFR BLD CALC: 25.6 % (ref 37–47)
MCH RBC QN AUTO: 25.4 PG (ref 27–31)
MCHC RBC AUTO-ENTMCNC: 31.1 G/DL (ref 33–37)
MCV RBC AUTO: 81.7 FL (ref 81–99)
PLATELET # BLD: 326 /CUMM (ref 130–400)
PMV BLD AUTO: 9.5 FL (ref 7.4–10.4)
RED BLOOD CELL CT: 3.13 /CUMM (ref 4.2–5.4)
WBC # BLD AUTO: 22.3 /CUMM (ref 4.8–10.8)

## 2018-07-16 NOTE — PN- RESIDENT CRCU
Bo Sainz 18 1022:
Subjective
HPI/CRCU Issues:
End stage COPD/COPD Exacerbation
Chronic anemia due to CKD
Paroxysmal atrial fibrillation
HTN
CKD stage III 2/2 hypertensive nephrosclerosis
Anxiety
Squamous cell lung ca
 
Objective
 
Vital Signs & I&O
Last 8 Hrs of Vitals and I&O:
 Laboratory Tests
 
 
 07/16 07/15
 
 0600 1700
 
Chemistry  
 
  Sodium (137 - 145 mmol/L) 138 
 
  Potassium (3.5 - 5.1 mmol/L) 5.8  H 
 
  Chloride (98 - 107 mmol/L) 106 
 
  Carbon Dioxide (22 - 30 mmol/L) 20  L 
 
  Anion Gap (5 - 16) 11 
 
  BUN (7 - 17 mg/dL) 67  H 
 
  Creatinine (0.5 - 1.0 mg/dL) 2.1  H 
 
  Estimated GFR (>60 ml/min) 23  L 
 
  Glucose (65 - 99 mg/dL) 231  H 
 
  Calcium (8.4 - 10.2 mg/dL) 8.4 
 
  Phosphorus (2.5 - 4.5 mg/dL) 5.5  H 
 
  Magnesium (1.6 - 2.3 mg/dL) 2.1 
 
  Total Bilirubin (0.2 - 1.3 mg/dL) 0.4 
 
  AST (14 - 36 U/L) 20 
 
  ALT (9 - 52 U/L) 27 
 
  Albumin (3.5 - 5.0 g/dL) 2.8  L 
 
Hematology  
 
  CBC w Diff MAN DIFF ORDERED 
 
  WBC (4.8 - 10.8 /CUMM) 22.3  H 
 
  RBC (4.20 - 5.40 /CUMM) 3.13  L 
 
  Hgb (12.0 - 16.0 G/DL) 8.0  L 
 
  Hct (37 - 47 %) 25.6  L 
 
  MCV (81.0 - 99.0 FL) 81.7 
 
  MCH (27.0 - 31.0 PG) 25.4  L 
 
  MCHC (33.0 - 37.0 G/DL) 31.1  L 
 
  RDW (11.5 - 14.5 %) 20.5  H 
 
  Plt Count (130 - 400 /CUMM) 326 
 
  MPV (7.4 - 10.4 FL) 9.5 
 
  Segmented Neutrophils (42.2 - 75.2 %) 95  H 
 
  Lymphocytes (20.5 - 51.1 %) 2  L 
 
  Monocytes (1.7 - 9.3 %) 3 
 
  Nucleated RBCs (0.0 - 0.0 /100WBC) 1  H 
 
  Platelet Estimate (ADEQUATE) ADEQUATE 
 
  Polychromasia 1+ 
 
  Hypochromic-Microcytic 1+ 
 
  Basophilic Stippling RARE 
 
  Anisocytosis 1+ 
 
Urines  
 
  Urine Color (YEL,AMB,STR)  YEL
 
  Urine Clarity (CLEAR)  CLEAR
 
  Urine pH (5.0 - 8.0)  6.0
 
  Ur Specific Gravity (1.001 - 1.035)  1.020
 
  Urine Protein (NEG,<30 MG/DL)  NEG
 
  Urine Ketones (NEG)  NEG
 
  Urine Nitrite (NEG)  NEG
 
  Urine Bilirubin (NEG)  NEG
 
  Urine Urobilinogen (0.1  -  1.0 EU/dl)  0.2
 
  Ur Leukocyte Esterase (NEG)  NEG
 
  Ur Microscopic  EXAM NOT REQUIRED
 
  Urine Hemoglobin (NEG)  NEG
 
  Urine Glucose (N MG/DL)  100  H
 
 
 Vital Signs
 
 
Date Time Temp Pulse Resp B/P B/P Pulse O2 O2 Flow FiO2
 
     Mean Ox Delivery Rate 
 
 0840  73  116/58     
 
 0839  75  116/58     
 
 0800 97.6 66 18 100/50  99 Nasal 45% 
 
       Cannula  
 
 0800      99 Nasal 45% 
 
       Cannula  
 
 0758      94 Nasal 50% 
 
       Cannula  
 
 0409      98 Nasal 50% 
 
       Cannula  
 
 0028      96 Nasal 50% 
 
       Cannula  
 
 0000      98 Nasal 50% 
 
       Cannula  
 
 0000 97.0 71 20 118/62  98 Nasal 50% 
 
       Cannula  
 
07/15 2154  79  128/72     
 
07/15 2000      96 Nasal 50% 
 
       Cannula  
 
07/15 1942      97 Nasal 50% 
 
       Cannula  
 
07/15 1701      94 Nasal 50% 
 
       Cannula  
 
07/15 1600      96 Nasal 50% 
 
       Cannula  
 
07/15 1600 98.1 74 28 120/60  94 Nasal 50% 
 
       Cannula  
 
07/15 1200      94 Nasal 50% 
 
       Cannula  
 
 
 Intake & Output
 
 
  1600 16 0800  0000
 
Intake Total  100 780
 
Output Total  600 1000
 
Balance  -500 -220
 
    
 
Intake, IV   280
 
Intake, Oral  100 500
 
Output, Urine  600 1000
 
 
 
 
Exam
General Appearance: no apparent distress, comfortable, thin
Head: atraumatic, normal appearance
Neck: normal inspection, supple
Respiratory: chest non-tender, rhonchi, respiratory distress on exertion
Cardiovascular: regular rate/rhythm
Gastrointestinal: normal bowel sounds, soft, non-tender, no organomegaly
Extremities: normal inspection, no edema
Current Medications:
 Current Medications
 
 
  Sig/Enrique Start time  Last
 
Medication Dose Route Stop Time Status Admin
 
Acetaminophen 650 MG Q8P PRN  2100 AC 
 
  PO   
 
Albuterol Sulfate 3 ML BID  2100 AC 07/15
 
  INH   1936
 
Alprazolam 0.5 MG BID  0945 AC 
 
  PO  0944  0957
 
Alprazolam 0.5 MG ONCE ONE 07/15 2145 DC 07/15
 
  PO 07/15 2146  2153
 
Alprazolam 0.5 MG BID  2100 DC 07/15
 
  PO 07/15 2059  1019
 
Aspirin 81 MG DAILY  0900 AC 
 
  PO   0838
 
Atorvastatin Calcium 40 MG 1700 09 1700 AC 07/15
 
  PO   1658
 
Budesonide/ 2 PUF BID  1030 AC 
 
Formoterol Fumarate  INH   0841
 
Ceftazidime 2,000 MG Q24H 07/15 1200 DC 07/15
 
  IV   1355
 
Ezetimibe 10 MG DAILY  0900 AC 
 
  PO   0840
 
Furosemide 20 MG DAILY  0900 AC 
 
  PO   0840
 
Heparin Sodium  5,000 UNIT Q8  2200 AC 
 
(Porcine)  SC   0619
 
Insulin Aspart 0 TIDAC  1700 AC 
 
  SC   0844
 
Levothyroxine Sodium 0.05 MG 0700 07/10 0700 AC 
 
  PO   0616
 
Methylprednisolone 20 MG Q12  2100 DC 
 
  IV   0844
 
Metoprolol Succinate 50 MG DAILY  0900 AC 
 
  PO   0840
 
Mirtazapine 15 MG AT BEDTIME  0015 AC 07/15
 
  PO   2153
 
Omeprazole 40 MG DAILY AC 07/10 0700 AC 
 
  PO   0616
 
Prednisone 20 MG DAILY  0900 AC 
 
  PO   
 
Ranolazine 1,000 MG BID  0002 AC 
 
  PO   0839
 
Senna/Docusate Sodium 1 TAB AT BEDTIME AS NEED..  1133 AC 
 
  PO   
 
Vancomycin HCl 1,000 MG ONCE ONE 07/15 1045 DC 07/15
 
Sodium Chloride 250 ML IV 07/15 1144  1355
 
 
 
CXR Findings:

EXAMINATION:
XR PORTABLE CHEST
 
IMPRESSION:
 
1. Extensive obstructive lung disease with fibrotic changes in the mid and
lower lung bilaterally. No superimposed focal acute process is seen.
Specifically, no evidence of pulmonary edema is seen.
2. Slight indistinctness of the CP angles is noted, likely due to atelectatic
change, though trace pleural effusions cannot be completely excluded.
 
 
Impression/Plan
 
Impression/Problem List
Impression:
67 y/o F with a PMH of CAD, HTN, HLD, Squamous lung cell Ca, CKD, and paroxysmal
afib not on anticoagulation due to previous GI bleed that presented to the ED c/
o progressive dyspnea after not achieving relief using her rescue inhaler. She 
was admitted to the telemetry unit with an admitting diagnosis of COPD 
exacerbation and subsequently transfered to the ICU for closer monitoring due to
her desaturation on .  
 
ASSESSMENT AND PLAN
#End stage COPD/COPD Exacerbation
#Chronic anemia due to CKD
#Paroxysmal atrial fibrillation
#HTN
#CKD stage III 2/2 hypertensive nephrosclerosis
#Anxiety
#Squamous cell lung ca
 
End-stage COPD / COPD exacerbation/ Hypoxemic respiratory failure
Pt with a chest CT () showing diffuse pulmonary emphysema with chronic 
interstitial pneumonitis. Prior admit show similar complains of SOB - her 
symptoms are most likely related to her severe emphysematous picture that is 
worsened by her heightened state of anxiety that induces hyperventilation as per
pt. Pt is off antibiotics, her upward trending leukocytosis is most likely 
related to her steroid use.  Steroids have been switched to PO. Unclear if there
is a fluid overload component, f/u CXR ordered to reassess. Currently patient is
not in respiratory distress with 98% O2 sat on high flow 50% O2.
-TRC/Continue neb treatments
-Symbicort 2 puffs q12
-Prednisone 40mg PO
-F/u CXR
 
Paroxysmal A-fib / HTN
Pt currently off anticoagulation due to episodes of GI bleeding and chronic 
anemia due to CKD. Her BP has been controlled on metoprolol, cardiology is 
following. 
-Off anticoag 
-Metoprolol 50 mg qDaily
-Continue ASA/Statin/Ezetimibe
 
Chronic Anemia due to CKD, s/p 1U PRBC
Pt with Hb 8.8, baseline around 8-10. Most likely related to CKD rather than 
massive acute blood loss; though guaic of stools have been positive since .
Will need further workup to determine exact cause. She is on EPO at home, 1 dose
was given during her admission. Next dose of EPO as per regular schedule if pt 
remains admitted. 
-f/u CBC
 
CKD stage III 2/2 hypertensive nephrosclerosis
Pt being followed by Dr. Chacon, currently with K at 5.8, with a baseline Cr 
1.5-2.2 and increasing BUN. Latest bicarb was 20 -- lasix will be held and 
NaHCO3 1300 mg BID will be added. As per nephrology's recommendation, lasix will
be held 
-<2g K in diet
-Sodium Bicarb 1300 mg BID
-Kayexalate PRN if K>5.7
-Continue trending
-f/u nephros recommendation
 
 
Anxiety
Pt described herself as a very anxious person, especially with regards to her 
overall health.  She is currently on Alprazolam 0.5 mg at home. As per the pt, 
being in the ICU has increased her anxiety and feels discomfort as a result. 
Home dose of alprazolam restarted.
-Alprazolam 0.5 mg BID
 
 
Squamous cell lung CA 
S/p post gamma knife radiation, followed by Dr. Corcoran - latest visit received a
referral to an oncologist to evaluate a new left-sided lung mass that was 
positive for malignancy on biopsy.
-Oncology Consult requested 
 
 
DNR
DVT PPX: ALPS, sq heparin
Regular Diet
Problem List:
 1. Anxiety
 
 2. COPD (chronic obstructive pulmonary disease)
 
Pain Ratin
Tomorrow's Labs & Rationales:
cxr
BEP
 
Plan
DVT/Prophylaxis: mechanical, pharmacological
 
 
Raghav Corcoran MD 18 1141:
Attending MD Review Statement
 
Attending Sign Off
Attending Cosign Statement:
I have: examined this patient, reviewed [facility] EMR data, personally reviewd 
images, discussd w/resident/PA/NP, discussed mgmt plan w/brijesh, discussed mgmt 
plan w/CM, discussed mgmt plan w/pt, agreed w/resident/PA/NP, amended to note. 
Other Findings:
Impression
68 year old woman
 
ICU stay for hypoxemic acute on chronic respiratory failure
leukocytosis
squamous cell lung ca recently dx
anemia
ckd
 
Plan
-reduce iv solumedrol to po prednisone given leukocytosis tomorrow
-will evaluate o2 needs - currently on high flow oxygen
-stop abx, can be reactive to steroids, will monitor off at this time, no 
evidence of pna at this time, will send c.diff stool if loose
-nephrology follow up
-will d/w oncology if pt can be introduced to Dr. Verdin if possible during 
admission 
-resume bid xanax
-trc/nebs
-cxr portable
 
DVT prophylaxis at all times
 
currently DNR - she is evaluating intubation status currently accepting
 
TTS 35 min

## 2018-07-16 NOTE — PN- CARDIOLOGY
Subjective
Subjective:
 
Patient is resting comfortably on high flow nasal cannula oxygen. She denies any
chest pain, palpitations, or shortness of breath.
 
Objective
Vital Signs and I&Os
Vital Signs
 
 
Date Time Temp Pulse Resp B/P B/P Pulse O2 O2 Flow FiO2
 
     Mean Ox Delivery Rate 
 
07/16 0840  73  116/58     
 
07/16 0839  75  116/58     
 
07/16 0800 97.6 66 18 100/50  99 Nasal 45% 
 
       Cannula  
 
07/16 0800      99 Nasal 45% 
 
       Cannula  
 
07/16 0758      94 Nasal 50% 
 
       Cannula  
 
07/16 0409      98 Nasal 50% 
 
       Cannula  
 
07/16 0028      96 Nasal 50% 
 
       Cannula  
 
07/16 0000      98 Nasal 50% 
 
       Cannula  
 
07/16 0000 97.0 71 20 118/62  98 Nasal 50% 
 
       Cannula  
 
07/15 2154  79  128/72     
 
07/15 2000      96 Nasal 50% 
 
       Cannula  
 
07/15 1942      97 Nasal 50% 
 
       Cannula  
 
07/15 1701      94 Nasal 50% 
 
       Cannula  
 
07/15 1600      96 Nasal 50% 
 
       Cannula  
 
07/15 1600 98.1 74 28 120/60  94 Nasal 50% 
 
       Cannula  
 
07/15 1200      94 Nasal 50% 
 
       Cannula  
 
 
 Intake & Output
 
 
 07/16 1600 07/16 0800 07/16 0000 07/15 1600 07/15 0800 07/15 0000
 
Intake Total  100 780 800 200 400
 
Output Total  600 1000 600 400 900
 
Balance  -500 -220 200 -200 -500
 
       
 
Intake, IV   280   
 
Intake, Oral  100 500 800 200 400
 
Number      1
 
Bowel      
 
Movements      
 
Output, Urine  600 1000 600 400 900
 
Patient    135 lb  
 
Weight      
 
Weight    Bed scale  
 
Measurement      
 
Method      
 
 
 
Physical Exam:
 
General: no apparent distress. Alert.  On high flow nasal cannula oxygen
Eyes: No obvious scleral icterus.
HEENT: No jugular venous distention or abnormal jugular venous pulsations.
Cardiovascular: Normal intensity S1/S2.  Regular
Respiratory: Decreased air entry bilaterally
Abdomen: Soft, nontender with no guarding or rebound tenderness.
Musculoskeletal: No clubbing or cyanosis noted; no edema
Skin: warm
Neurologic: No gross focal deficits noted.
Lymph: No gross lymphadenopathy. 
 
Current Medications:
 Current Medications
 
 
  Sig/Enrique Start time  Last
 
Medication Dose Route Stop Time Status Admin
 
Acetaminophen 650 MG Q8P PRN 07/08 2100 AC 
 
  PO   
 
Albuterol Sulfate 3 ML BID 07/09 2100 AC 07/15
 
  INH   1936
 
Alprazolam 0.5 MG BID 07/16 0945 AC 07/16
 
  PO 07/23 0944  0957
 
Alprazolam 0.5 MG ONCE ONE 07/15 2145 DC 07/15
 
  PO 07/15 2146  2153
 
Alprazolam 0.5 MG BID 07/08 2100 DC 07/15
 
  PO 07/15 2059  1019
 
Aspirin 81 MG DAILY 07/09 0900 AC 07/16
 
  PO   0838
 
Atorvastatin Calcium 40 MG 1700 07/09 1700 AC 07/15
 
  PO   1658
 
Budesonide/ 2 PUF BID 07/09 1030 AC 07/16
 
Formoterol Fumarate  INH   0841
 
Ceftazidime 2,000 MG Q24H 07/15 1200 DC 07/15
 
  IV   1355
 
Ezetimibe 10 MG DAILY 07/09 0900 AC 07/16
 
  PO   0840
 
Furosemide 20 MG DAILY 07/12 0900 AC 07/16
 
  PO   0840
 
Heparin Sodium  5,000 UNIT Q8 07/08 2200 AC 07/16
 
(Porcine)  SC   0619
 
Insulin Aspart 0 TIDAC 07/12 1700 AC 07/16
 
  SC   0844
 
Levothyroxine Sodium 0.05 MG 0700 07/10 0700 AC 07/16
 
  PO   0616
 
Methylprednisolone 20 MG Q12 07/14 2100 DC 07/16
 
  IV   0844
 
Metoprolol Succinate 50 MG DAILY 07/09 0900 AC 07/16
 
  PO   0840
 
Mirtazapine 15 MG AT BEDTIME 07/09 0015 AC 07/15
 
  PO   2153
 
Omeprazole 40 MG DAILY AC 07/10 0700 AC 07/16
 
  PO   0616
 
Prednisone 20 MG DAILY 07/17 0900 AC 
 
  PO   
 
Ranolazine 1,000 MG BID 07/09 0002 AC 07/16
 
  PO   0839
 
Senna/Docusate Sodium 1 TAB AT BEDTIME AS NEED.. 07/12 1133 AC 
 
  PO   
 
Vancomycin HCl 1,000 MG ONCE ONE 07/15 1045 DC 07/15
 
Sodium Chloride 250 ML IV 07/15 1144  1355
 
 
 
 
Results
Last 48 Hrs of Labs/Mics:
 Laboratory Tests
 
07/16/18 0600:
Anion Gap 11, Estimated GFR 23  L, Glucose 231  H, Calcium 8.4, Phosphorus 5.5  
H, Magnesium 2.1, Total Bilirubin 0.4, AST 20, ALT 27, Albumin 2.8  L, CBC w 
Diff MAN DIFF ORDERED, RBC 3.13  L, MCV 81.7, MCH 25.4  L, MCHC 31.1  L, RDW 
20.5  H, MPV 9.5, Segmented Neutrophils 95  H, Lymphocytes 2  L, Monocytes 3, 
Nucleated RBCs 1  H, Platelet Estimate ADEQUATE, Polychromasia 1+, Hypochromic-
Microcytic 1+, Basophilic Stippling RARE, Anisocytosis 1+
 
07/15/18 1700:
Urine Color YEL, Urine Clarity CLEAR, Urine pH 6.0, Ur Specific Gravity 1.020, 
Urine Protein NEG, Urine Ketones NEG, Urine Nitrite NEG, Urine Bilirubin NEG, 
Urine Urobilinogen 0.2, Ur Leukocyte Esterase NEG, Ur Microscopic EXAM NOT 
REQUIRED, Urine Hemoglobin NEG, Urine Glucose 100  H
 
07/15/18 0530:
Anion Gap 12, Estimated GFR 23  L, BUN/Creatinine Ratio 29.5  H, CBC w Diff MAN 
DIFF ORDERED, RBC 3.28  L, MCV 81.1, MCH 25.8  L, MCHC 31.8  L, RDW 19.9  H, MPV
8.9, Segmented Neutrophils 95  H, Band Neutrophils 1, Lymphocytes 4  L, Platelet
Estimate ADEQUATE, Polychromasia 1+, Hypochromic-Microcytic 1+, Basophilic 
Stippling SLIGHT, Anisocytosis 1+
 
Recent Imaging Studies:
 
Telemetry tracings were personally reviewed and shows sinus rhythm
 
Assessment/Plan
Assessment/Plan
 
1.  Worsening dyspnea with associated hypoxemia-likely related to underlying 
pulmonary issues
2.  History of coronary artery disease, status post intervention in 2001 and 
2014 with drug-eluting stents to the right coronary in 2016
3.  History of lung cancer, status post gamma knife radiation
4.  History of paroxysmal atrial fibrillation, not on anticoagulant therapy due 
to GI bleeding and anemia
4.  Chronic renal insufficiency
5.  Hypertension
 
Patient currently stable on high flow nasal cannula oxygen but appears 
comfortable.  Appears reasonably euvolemic on exam and can continue with low-
dose oral Lasix.  Remains in sinus rhythm on telemetry. Blood pressure is well 
controlled.
 
 
Benjamin Pacheco MD [facility]
Continue telemetry? No

## 2018-07-16 NOTE — RADIOLOGY REPORT
EXAMINATION:
XR PORTABLE CHEST
 
CLINICAL INFORMATION:
Bilateral crackles. Follow-up from previous. COPD.
 
COMPARISON:
Several prior chest x-rays, most recent of which is dated 07/14/2018. CT scan
of the chest dated 07/09/2018.
 
TECHNIQUE:
Portable AP semierect view of the chest was obtained.
 
FINDINGS:
EKG leads overlie the chest. The cardiomediastinal silhouette is within
normal limits in size.
 
Low lung volumes are seen with diffuse increased reticular lung markings and
slight thickening of the central airways, consistent with the previously
demonstrated obstructive lung disease and fibrosis. Mild bibasilar
subsegmental atelectasis is seen. No focal consolidation, significant
effusion or pneumothorax is seen.
 
Bony structures are grossly unremarkable.
 
IMPRESSION:
Findings consistent with mild bibasilar subsegmental atelectasis superimposed
upon chronic obstructive lung disease and fibrotic lung changes. No evolving
pneumonia seen.

## 2018-07-16 NOTE — PN- NEPHROLOGY
Assessment/Plan Nephrology
Assessment:
Stage III CKD - 2/2 hypertensive nephrosclerosis - baseline SCr is 1.5-2.2.  The
fact that she is at the upper end of her normal at this point may be 2/2 
relative intravascular volume depletion (was on QOD lasix as an outpatient and 
has been getting daily here). 
 
Hyperkalemia - Tendency in the past and currently likely 2/2 catabolic state.  
Bicarb is a bit low - may help to supplement.  
 
Anemia - On outpatient epogen which she received on 7/9.
Suggestion:
-Hold lasix for at least a day
-PRN kayexalate for K>5.7
-Start sodium bicarb 1300mg BID
-Will need next dose of Epogen when due if remains in-house
 
Please call  with ?'s
 
 
Subjective
Subjective:
SCr 2.1; BUN up to 67
On steroids - IV solumedrol since 7/9
K 5.8
Bicarb 20
WBC stable at 22.3
Hg 8.0; got 34,000U Epogen on 7/9
On 20mg PO lasix since 7/9
No pulm edema on chest x-ray
Feels like breathing is a bit better
 
Objective
Vital Signs and I&Os
Vital Signs
 
 
Date Time Temp Pulse Resp B/P B/P Pulse O2 O2 Flow FiO2
 
     Mean Ox Delivery Rate 
 
07/16 0840  73  116/58     
 
07/16 0839  75  116/58     
 
07/16 0800 97.6 66 18 100/50  99 Nasal 45% 
 
       Cannula  
 
07/16 0800      99 Nasal 45% 
 
       Cannula  
 
07/16 0758      94 Nasal 50% 
 
       Cannula  
 
07/16 0409      98 Nasal 50% 
 
       Cannula  
 
07/16 0028      96 Nasal 50% 
 
       Cannula  
 
07/16 0000      98 Nasal 50% 
 
       Cannula  
 
07/16 0000 97.0 71 20 118/62  98 Nasal 50% 
 
       Cannula  
 
07/15 2154  79  128/72     
 
07/15 2000      96 Nasal 50% 
 
       Cannula  
 
07/15 1942      97 Nasal 50% 
 
       Cannula  
 
07/15 1701      94 Nasal 50% 
 
       Cannula  
 
07/15 1600      96 Nasal 50% 
 
       Cannula  
 
07/15 1600 98.1 74 28 120/60  94 Nasal 50% 
 
       Cannula  
 
07/15 1200      94 Nasal 50% 
 
       Cannula  
 
 
 Intake & Output
 
 
 07/16 1600 07/16 0400 07/15 1600 07/15 0400 07/14 1600 07/14 0400
 
Intake Total  400 820 120
 
Output Total 600 1000 1000 900 850 400
 
Balance -500 -220 0 -500 -30 -280
 
       
 
Intake, IV  280    
 
Intake, Oral  400 820 120
 
Number    1 2 1
 
Bowel      
 
Movements      
 
Output, Urine 600 1000 1000 900 850 400
 
Patient   135 lb  131 lb 152 lb
 
Weight      
 
Weight   Bed scale  Bed scale 
 
Measurement      
 
Method      
 
 
 
Physical Exam:
Gen - ok appearing
HEENT - supple
CV - RRR, no m/r/g
Chest - clear, no w/r/r
Abd - soft, NTND
Ext - warm, no edema
Neuro - AOX3, grossly nonfocal
Current Medications:
 Current Medications
 
 
  Sig/Enrique Start time  Last
 
Medication Dose Route Stop Time Status Admin
 
Acetaminophen 650 MG Q8P PRN 07/08 2100 AC 
 
  PO   
 
Albuterol Sulfate 3 ML BID 07/09 2100 AC 07/15
 
  INH   1936
 
Alprazolam 0.5 MG BID 07/16 0945 AC 07/16
 
  PO 07/23 0944  0957
 
Alprazolam 0.5 MG ONCE ONE 07/15 2145 DC 07/15
 
  PO 07/15 2146  2153
 
Alprazolam 0.5 MG BID 07/08 2100 DC 07/15
 
  PO 07/15 2059  1019
 
Aspirin 81 MG DAILY 07/09 0900 AC 07/16
 
  PO   0838
 
Atorvastatin Calcium 40 MG 1700 07/09 1700 AC 07/15
 
  PO   1658
 
Budesonide/ 2 PUF BID 07/09 1030 AC 07/16
 
Formoterol Fumarate  INH   0841
 
Ceftazidime 2,000 MG Q24H 07/15 1200 DC 07/15
 
  IV   1355
 
Ezetimibe 10 MG DAILY 07/09 0900 AC 07/16
 
  PO   0840
 
Furosemide 20 MG DAILY 07/12 0900 AC 07/16
 
  PO   0840
 
Heparin Sodium  5,000 UNIT Q8 07/08 2200 AC 07/16
 
(Porcine)  SC   0619
 
Insulin Aspart 0 TIDAC 07/12 1700 AC 07/16
 
  SC   0844
 
Levothyroxine Sodium 0.05 MG 0700 07/10 0700 AC 07/16
 
  PO   0616
 
Methylprednisolone 20 MG Q12 07/14 2100 DC 07/16
 
  IV   0844
 
Metoprolol Succinate 50 MG DAILY 07/09 0900 AC 07/16
 
  PO   0840
 
Mirtazapine 15 MG AT BEDTIME 07/09 0015 AC 07/15
 
  PO   2153
 
Omeprazole 40 MG DAILY AC 07/10 0700 AC 07/16
 
  PO   0616
 
Prednisone 20 MG DAILY 07/17 0900 AC 
 
  PO   
 
Ranolazine 1,000 MG BID 07/09 0002 AC 07/16
 
  PO   0839
 
Senna/Docusate Sodium 1 TAB AT BEDTIME AS NEED.. 07/12 1133 AC 
 
  PO   
 
Vancomycin HCl 1,000 MG ONCE ONE 07/15 1045 DC 07/15
 
Sodium Chloride 250 ML IV 07/15 1144  1355
 
 
 
 
Results
Pertinent Lab Results:
 Laboratory Tests
 
 
 07/16 07/15
 
 0600 1700
 
Chemistry  
 
  Sodium (137 - 145 mmol/L) 138 
 
  Potassium (3.5 - 5.1 mmol/L) 5.8  H 
 
  Chloride (98 - 107 mmol/L) 106 
 
  Carbon Dioxide (22 - 30 mmol/L) 20  L 
 
  Anion Gap (5 - 16) 11 
 
  BUN (7 - 17 mg/dL) 67  H 
 
  Creatinine (0.5 - 1.0 mg/dL) 2.1  H 
 
  Estimated GFR (>60 ml/min) 23  L 
 
  Glucose (65 - 99 mg/dL) 231  H 
 
  Calcium (8.4 - 10.2 mg/dL) 8.4 
 
  Phosphorus (2.5 - 4.5 mg/dL) 5.5  H 
 
  Magnesium (1.6 - 2.3 mg/dL) 2.1 
 
  Total Bilirubin (0.2 - 1.3 mg/dL) 0.4 
 
  AST (14 - 36 U/L) 20 
 
  ALT (9 - 52 U/L) 27 
 
  Albumin (3.5 - 5.0 g/dL) 2.8  L 
 
Hematology  
 
  CBC w Diff MAN DIFF ORDERED 
 
  WBC (4.8 - 10.8 /CUMM) 22.3  H 
 
  RBC (4.20 - 5.40 /CUMM) 3.13  L 
 
  Hgb (12.0 - 16.0 G/DL) 8.0  L 
 
  Hct (37 - 47 %) 25.6  L 
 
  MCV (81.0 - 99.0 FL) 81.7 
 
  MCH (27.0 - 31.0 PG) 25.4  L 
 
  MCHC (33.0 - 37.0 G/DL) 31.1  L 
 
  RDW (11.5 - 14.5 %) 20.5  H 
 
  Plt Count (130 - 400 /CUMM) 326 
 
  MPV (7.4 - 10.4 FL) 9.5 
 
  Segmented Neutrophils (42.2 - 75.2 %) 95  H 
 
  Lymphocytes (20.5 - 51.1 %) 2  L 
 
  Monocytes (1.7 - 9.3 %) 3 
 
  Nucleated RBCs (0.0 - 0.0 /100WBC) 1  H 
 
  Platelet Estimate (ADEQUATE) ADEQUATE 
 
  Polychromasia 1+ 
 
  Hypochromic-Microcytic 1+ 
 
  Basophilic Stippling RARE 
 
  Anisocytosis 1+ 
 
Urines  
 
  Urine Color (YEL,AMB,STR)  YEL
 
  Urine Clarity (CLEAR)  CLEAR
 
  Urine pH (5.0 - 8.0)  6.0
 
  Ur Specific Gravity (1.001 - 1.035)  1.020
 
  Urine Protein (NEG,<30 MG/DL)  NEG
 
  Urine Ketones (NEG)  NEG
 
  Urine Nitrite (NEG)  NEG
 
  Urine Bilirubin (NEG)  NEG
 
  Urine Urobilinogen (0.1  -  1.0 EU/dl)  0.2
 
  Ur Leukocyte Esterase (NEG)  NEG
 
  Ur Microscopic  EXAM NOT REQUIRED
 
  Urine Hemoglobin (NEG)  NEG
 
  Urine Glucose (N MG/DL)  100  H
 
 
 
 
 07/15 07/14
 
 0530 0630
 
Chemistry  
 
  Sodium (137 - 145 mmol/L) 136  L 139
 
  Potassium (3.5 - 5.1 mmol/L) 5.3  H 5.6  H
 
  Chloride (98 - 107 mmol/L) 103 104
 
  Carbon Dioxide (22 - 30 mmol/L) 22 23
 
  Anion Gap (5 - 16) 12 12
 
  BUN (7 - 17 mg/dL) 62  H 57  H
 
  Creatinine (0.5 - 1.0 mg/dL) 2.1  H 1.7  H
 
  Estimated GFR (>60 ml/min) 23  L 30  L
 
  BUN/Creatinine Ratio (7 - 25 %) 29.5  H 33.5  H
 
Hematology  
 
  CBC w Diff MAN DIFF ORDERED MAN DIFF ORDERED
 
  WBC (4.8 - 10.8 /CUMM) 24.6  H 22.2  H
 
  RBC (4.20 - 5.40 /CUMM) 3.28  L 3.39  L
 
  Hgb (12.0 - 16.0 G/DL) 8.5  L 8.8  L
 
  Hct (37 - 47 %) 26.6  L 28.0  L
 
  MCV (81.0 - 99.0 FL) 81.1 82.5
 
  MCH (27.0 - 31.0 PG) 25.8  L 25.9  L
 
  MCHC (33.0 - 37.0 G/DL) 31.8  L 31.4  L
 
  RDW (11.5 - 14.5 %) 19.9  H 20.7  H
 
  Plt Count (130 - 400 /CUMM) 351 373
 
  MPV (7.4 - 10.4 FL) 8.9 9.3
 
  Segmented Neutrophils (42.2 - 75.2 %) 95  H 89  H
 
  Band Neutrophils (0.0 - 5.0 %) 1 1
 
  Lymphocytes (20.5 - 51.1 %) 4  L 5  L
 
  Monocytes (1.7 - 9.3 %)  5
 
  Nucleated RBCs (0.0 - 0.0 /100WBC)  3  H
 
  Platelet Estimate (ADEQUATE) ADEQUATE VERIFIED BY SMEAR
 
  Polychromasia 1+ 1+
 
  Hypochromic-Microcytic 1+ 
 
  Basophilic Stippling SLIGHT 
 
  Anisocytosis 1+ 1+
 
 
 
Imaging/Other Studies:
Chest X-ray
IMPRESSION:
 
1. Extensive obstructive lung disease with fibrotic changes in the mid and
lower lung bilaterally. No superimposed focal acute process is seen.
Specifically, no evidence of pulmonary edema is seen.
2. Slight indistinctness of the CP angles is noted, likely due to atelectatic
change, though trace pleural effusions cannot be completely excluded.

## 2018-07-17 VITALS — SYSTOLIC BLOOD PRESSURE: 136 MMHG | DIASTOLIC BLOOD PRESSURE: 60 MMHG

## 2018-07-17 VITALS — DIASTOLIC BLOOD PRESSURE: 70 MMHG | SYSTOLIC BLOOD PRESSURE: 150 MMHG

## 2018-07-17 VITALS — DIASTOLIC BLOOD PRESSURE: 58 MMHG | SYSTOLIC BLOOD PRESSURE: 120 MMHG

## 2018-07-17 VITALS — SYSTOLIC BLOOD PRESSURE: 110 MMHG | DIASTOLIC BLOOD PRESSURE: 48 MMHG

## 2018-07-17 LAB
ERYTHROCYTE [DISTWIDTH] IN BLOOD BY AUTOMATED COUNT: 20.9 % (ref 11.5–14.5)
HCT VFR BLD CALC: 29.5 % (ref 37–47)
MCH RBC QN AUTO: 25.2 PG (ref 27–31)
MCHC RBC AUTO-ENTMCNC: 31 G/DL (ref 33–37)
MCV RBC AUTO: 81.4 FL (ref 81–99)
PLATELET # BLD: 355 /CUMM (ref 130–400)
PMV BLD AUTO: 9.4 FL (ref 7.4–10.4)
RED BLOOD CELL CT: 3.63 /CUMM (ref 4.2–5.4)
WBC # BLD AUTO: 27 /CUMM (ref 4.8–10.8)

## 2018-07-17 NOTE — PN- RESIDENT CRCU
Bo Sainz 07/17/18 0839:
Subjective
HPI/CRCU Issues:
#End stage COPD/COPD Exacerbation
#Chronic anemia due to CKD
#Paroxysmal atrial fibrillation
#HTN
#CKD stage III 2/2 hypertensive nephrosclerosis
#Anxiety
#Squamous cell lung ca
24 Hour Events:
pt seen and examined today. dyspnea on exertion is still present - also c/o leg 
cramps/pain though no erythema is noted nor is pain ellicited on palpation. No 
other acute events. 
 
Objective
 
Vital Signs & I&O
Last 8 Hrs of Vitals and I&O:
 Intake & Output
 
 
 07/17 1600
 
Intake Total 
 
Output Total 400
 
Balance -400
 
  
 
Output, Urine 400
 
Patient 133 lb
 
Weight 
 
Weight Bed scale
 
Measurement 
 
Method 
 
 
 
 
Exam
General Appearance: no apparent distress, alert, awake
Head: atraumatic, normal appearance
Neck: normal inspection, supple, full range of motion
Respiratory: chest non-tender, no respiratory distress
Cardiovascular: edema
Gastrointestinal: normal bowel sounds, soft, non-tender, no organomegaly
Extremities: normal inspection, normal capillary refill
Cranial Nerves: normal hearing, normal speech
Current Medications:
 Current Medications
 
 
  Sig/Enrique Start time  Last
 
Medication Dose Route Stop Time Status Admin
 
Acetaminophen 650 MG .STK-MED ONE 07/16 1632 DC 
 
  PO 07/16 1633  
 
Acetaminophen 650 MG Q8P PRN 07/08 2100 AC 07/16
 
  PO   1635
 
Albuterol Sulfate 3 ML BID 07/09 2100 AC 07/17
 
  INH   0845
 
Alprazolam 0.5 MG BID 07/16 0945 AC 07/17
 
  PO 07/23 0944  0939
 
Aspirin 81 MG DAILY 07/09 0900 AC 07/17
 
  PO   0939
 
Atorvastatin Calcium 40 MG 1700 07/09 1700 AC 07/16
 
  PO   1634
 
Budesonide/ 2 PUF BID 07/09 1030 AC 07/17
 
Formoterol Fumarate  INH   0939
 
Ezetimibe 10 MG DAILY 07/09 0900 AC 07/17
 
  PO   0939
 
Furosemide 20 MG DAILY 07/12 0900 DC 07/16
 
  PO   0840
 
Heparin Sodium  5,000 UNIT Q8 07/08 2200 AC 07/17
 
(Porcine)  SC   0607
 
Insulin Aspart 0 TIDAC 07/12 1700 AC 07/16
 
  SC   1635
 
Levothyroxine Sodium 0.05 MG 0700 07/10 0700 AC 07/17
 
  PO   0602
 
Metoprolol Succinate 50 MG DAILY 07/09 0900 AC 07/17
 
  PO   0939
 
Mirtazapine 15 MG AT BEDTIME 07/09 0015 AC 07/16
 
  PO   2224
 
Omeprazole 40 MG DAILY AC 07/10 0700 AC 07/17
 
  PO   0602
 
Prednisone 20 MG DAILY 07/17 0900 AC 07/17
 
  PO   0939
 
Ranolazine 1,000 MG BID 07/09 0002 AC 07/17
 
  PO   0939
 
Senna/Docusate Sodium 1 TAB AT BEDTIME AS NEED.. 07/12 1133 AC 07/17
 
  PO   1208
 
 
 
CXR Findings:
07/16
EXAM TYPE: RAD - XRY-PORTABLE CHEST XRAY
 
EXAMINATION:
XR PORTABLE CHEST
 
CLINICAL INFORMATION:
Bilateral crackles. Follow-up from previous. COPD.
 
COMPARISON:
Several prior chest x-rays, most recent of which is dated 07/14/2018. CT scan
of the chest dated 07/09/2018.
 
TECHNIQUE:
Portable AP semierect view of the chest was obtained.
 
FINDINGS:
EKG leads overlie the chest. The cardiomediastinal silhouette is within
normal limits in size.
 
Low lung volumes are seen with diffuse increased reticular lung markings and
slight thickening of the central airways, consistent with the previously
demonstrated obstructive lung disease and fibrosis. Mild bibasilar
subsegmental atelectasis is seen. No focal consolidation, significant
effusion or pneumothorax is seen.
 
Bony structures are grossly unremarkable.
 
IMPRESSION:
Findings consistent with mild bibasilar subsegmental atelectasis superimposed
upon chronic obstructive lung disease and fibrotic lung changes. No evolving
pneumonia seen.
 
 
 
Impression/Plan
 
Impression/Problem List
Impression:
67 y/o F with a PMH of CAD, HTN, HLD, Squamous lung cell Ca, CKD, and paroxysmal
afib not on anticoagulation due to previous GI bleed that presented to the ED c/
o progressive dyspnea after not achieving relief using her rescue inhaler. She 
was admitted to the telemetry unit with an admitting diagnosis of COPD 
exacerbation and subsequently transfered to the ICU for closer monitoring due to
her desaturation on 07/14.  
 
ASSESSMENT AND PLAN
#End stage COPD/COPD Exacerbation
#Chronic anemia due to CKD
#Paroxysmal atrial fibrillation
#HTN
#CKD stage III 2/2 hypertensive nephrosclerosis
#Anxiety
#Squamous cell lung ca
 
End-stage COPD / COPD exacerbation/ Hypoxemic respiratory failure
Pt with a chest CT (07/08) showing diffuse pulmonary emphysema with chronic 
interstitial pneumonitis. Prior admit show similar complains of SOB - her 
symptoms are most likely related to her severe emphysematous picture that is 
worsened by her heightened state of anxiety that induces hyperventilation as per
pt. Pt is off antibiotics, her upward trending leukocytosis is most likely 
related to her steroid use.  Steroids have been switched to PO. Unclear if there
is a fluid overload component. F/u CXR reported no pneumonia findings. Currently
patient is not in respiratory distress at rest but yes on exertion. with 98% O2 
sat on high flow 50% O2.
-TRC/Continue neb treatments
-Symbicort 2 puffs q12
-Prednisone 20mg PO
 
 
Paroxysmal A-fib / HTN
Pt currently off anticoagulation due to episodes of GI bleeding and chronic 
anemia due to CKD. Her BP has been controlled on metoprolol, cardiology is 
following. pt c/o leg pain/cramping, though no erythema is noted or pain is 
ellicited on palpation. B/L Venous doppler will be ordered to assess for the 
possibility of DVT.
-Off anticoag 
-Metoprolol 50 mg qDaily
-Continue ASA/Statin/Ezetimibe
-f/u venous doppler
 
Chronic Anemia due to CKD, s/p 1U PRBC
Pt with Hb 8.8, baseline around 8-10. Most likely related to CKD rather than 
massive acute blood loss; though guaic of stools have been positive since 07/12.
Will need further workup to determine exact cause. She is on EPO at home, 1 dose
was given during her admission. Next dose of EPO as per regular schedule if pt 
remains admitted. 
-f/u CBC
 
CKD stage III 2/2 hypertensive nephrosclerosis
Pt being followed by Dr. Chacon, currently with K at 5.8, with a baseline Cr 
1.5-2.2 and increasing BUN. Latest bicarb was 20 -- NaHCO3 1300 mg BID will be 
added. 
-<2g K in diet
-Sodium Bicarb 1300 mg BID
-Kayexalate PRN if K>5.7
-Continue trending
-f/u nephros recommendation
 
Anxiety
Pt described herself as a very anxious person, especially with regards to her 
overall health.  She is currently on Alprazolam 0.5 mg at home. As per the pt, 
being in the ICU has increased her anxiety and feels discomfort as a result. 
Home dose of alprazolam restarted.
-Alprazolam 0.5 mg BID
 
Squamous cell lung CA 
S/p post gamma knife radiation, followed by Dr. Corcoran - latest visit received a
referral to an oncologist to evaluate a new left-sided lung mass that was 
positive for malignancy on biopsy.
 
 
DNR
DVT PPX: ALPS, sq heparin
Regular Diet
Problem List:
 1. Anxiety
 
 2. PAF (paroxysmal atrial fibrillation)
 
 3. Decompensated COPD with exacerbation (chronic obstructive pulmonary disease 
)
 
 4. Lung cancer
 
Pain Rating: 3
Pain Location:
L leg
Tomorrow's Labs & Rationales:
doppler u/s
icu lab bundle
cbc
 
Plan
DVT/Prophylaxis: mechanical, pharmacological
 
 
Raghav Corcoran MD 07/17/18 0921:
Attending MD Review Statement
 
Attending Sign Off
Attending Cosign Statement:
I have: examined this patient, reviewed aval EMR data, personally reviewd 
images, discussd w/resident/PA/NP, discussed mgmt plan w/brijesh, discussed mgmt 
plan w/CM, discussed mgmt plan w/pt, agreed w/resident/PA/NP, amended to note. 
Other Findings:
Impression
68 year old woman
 
ICU stay for hypoxemic acute on chronic respiratory failure
leukocytosis
squamous cell lung ca recently dx
anemia
ckd
 
Plan
-reduce iv solumedrol to po prednisone given leukocytosis tomorrow
-will evaluate o2 needs - currently on high flow oxygen
-off abx, can be reactive to steroids, will monitor off at this time, no 
evidence of pna at this time, will send c.diff stool if loose, ID consultation 
given continued rise in leukocytosis will be considered if LE dopplers are 
negative and there is no change in leukocytosis tomorrow
-nephrology follow up
-will d/w oncology if pt can be introduced to Dr. Verdin if possible during 
admission 
-bid xanax
-trc/nebs
-check LE dopplers
 
DVT prophylaxis at all times
 
currently DNR - discussion held - she is accepting intubation if necessary at 
this point, however re-evaluating this wish
 
TTS 35 min

## 2018-07-17 NOTE — ULTRASOUND REPORT
EXAMINATION:
US TRIPLEX OF LOWER EXTREMITIES, BILATERAL
 
CLINICAL INFORMATION:
Bilateral lower extremity pain and tenderness
 
COMPARISON:
None
 
TECHNIQUE:
Color-flow triplex imaging with spectral analysis and compression Doppler
were performed on the lower extremities.
 
FINDINGS:
Respiratory variation, normal compression and augmented flow are noted
throughout the lower extremities. The visualized common femoral vein,
superficial femoral vein, profunda femoral vein, popliteal vein and midcalf
peroneal and posterior tibial venous segments show no evidence of deep venous
thrombosis.
 
There is no Baker's cyst.
 
IMPRESSION:
No evidence of deep venous thrombosis involving the bilateral lower
extremities.

## 2018-07-17 NOTE — PN- NEPHROLOGY
Assessment/Plan Nephrology
Assessment:
Stage III CKD - 2/2 hypertensive nephrosclerosis - baseline SCr is 1.5-2.2.  The
fact that she is just above the upper end of her normal at this point may be 2/2
relative intravascular volume depletion (was on QOD lasix as an outpatient and 
has been getting daily here).  Discussed with her that given her severe lung 
disease, there may need to be some tradeoff in terms of worsened renal function 
for improved pulmonary function. 
 
Hyperkalemia - Tendency in the past and currently likely 2/2 catabolic state.  
On supplemental bicarb.
 
Anemia - On outpatient epogen which she received on 7/9 and gets q3 weeks - 
target Hg 10-11.
Suggestion:
-Hold lasix for at least another day
-PRN kayexalate for K>5.7
-Cont sodium bicarb 1300mg BID
-Will need next dose of Epogen when due if remains in-house (gets q3 weeks)
 
Please call  with ?'s
 
 
Subjective
Subjective:
SCr up to 2.3
Lasix given through yesterday
K 5.2; bicarb 22
Breathing is about baseline
 
Objective
Vital Signs and I&Os
Vital Signs
 
 
Date Time Temp Pulse Resp B/P B/P Pulse O2 O2 Flow FiO2
 
     Mean Ox Delivery Rate 
 
07/17 1200 98.7 66 24 136/60  97 Nasal 50% 
 
       Cannula  
 
07/17 0846      97 Nasal 50% 
 
       Cannula  
 
07/17 0800 97.8 70 24 150/70  99 Nasal 50% 
 
       Cannula  
 
07/17 0400       Nasal 50% 
 
       Cannula  
 
07/17 0129      99 Nasal 50% 
 
       Cannula  
 
07/17 0000      98 Nasal 50% 
 
       Cannula  
 
07/16 2302 97.3 79 14 130/60  98 Nasal 50% 
 
       Cannula  
 
07/16 2224  76  120/68     
 
07/16 2019      95 Nasal 50% 
 
       Cannula  
 
07/16 2000      96 Nasal 50% 
 
       Cannula  
 
07/16 1600      99 Nasal 50% 
 
       Cannula  
 
07/16 1600 97.6 72 23 125/57  98 Nasal 40% 
 
       Cannula  
 
 
 Intake & Output
 
 
 07/17 1600 07/17 0400 07/16 1600 07/16 0400 07/15 1600 07/15 0400
 
Intake Total 200 240  400
 
Output Total  1000 1000 900
 
Balance -600 -110 -540 -220 0 -500
 
       
 
Intake, IV    280  
 
Intake, Oral 200 240  400
 
Number  0 0   1
 
Bowel      
 
Movements      
 
Output, Urine  1000 1000 900
 
Patient 133 lb    135 lb 
 
Weight      
 
Weight Bed scale    Bed scale 
 
Measurement      
 
Method      
 
 
 
Physical Exam:
Gen - ok appearing
HEENT - supple
CV - RRR, no m/r/g
Chest - clear, no w/r/r
Abd - soft, NTND
Ext - warm, no edema
Neuro - AOX3, grossly nonfocal
Current Medications:
 Current Medications
 
 
  Sig/Enrique Start time  Last
 
Medication Dose Route Stop Time Status Admin
 
Acetaminophen 650 MG .STK-MED ONE 07/16 1632 DC 
 
  PO 07/16 1633  
 
Acetaminophen 650 MG Q8P PRN 07/08 2100 AC 07/16
 
  PO   1635
 
Albuterol Sulfate 3 ML BID 07/09 2100 AC 07/17
 
  INH   0845
 
Alprazolam 0.5 MG BID 07/16 0945 AC 07/17
 
  PO 07/23 0944  0939
 
Aspirin 81 MG DAILY 07/09 0900 AC 07/17
 
  PO   0939
 
Atorvastatin Calcium 40 MG 1700 07/09 1700 AC 07/16
 
  PO   1634
 
Budesonide/ 2 PUF BID 07/09 1030 AC 07/17
 
Formoterol Fumarate  INH   0939
 
Ezetimibe 10 MG DAILY 07/09 0900 AC 07/17
 
  PO   0939
 
Furosemide 20 MG DAILY 07/12 0900 DC 07/16
 
  PO   0840
 
Heparin Sodium  5,000 UNIT Q8 07/08 2200 AC 07/17
 
(Porcine)  SC   0607
 
Insulin Aspart 0 TIDAC 07/12 1700 AC 07/16
 
  SC   1635
 
Levothyroxine Sodium 0.05 MG 0700 07/10 0700 AC 07/17
 
  PO   0602
 
Metoprolol Succinate 50 MG DAILY 07/09 0900 AC 07/17
 
  PO   0939
 
Mirtazapine 15 MG AT BEDTIME 07/09 0015 AC 07/16
 
  PO   2224
 
Omeprazole 40 MG DAILY AC 07/10 0700 AC 07/17
 
  PO   0602
 
Prednisone 20 MG DAILY 07/17 0900 AC 07/17
 
  PO   0939
 
Ranolazine 1,000 MG BID 07/09 0002 AC 07/17
 
  PO   0939
 
Senna/Docusate Sodium 1 TAB AT BEDTIME AS NEED.. 07/12 1133 AC 07/17
 
  PO   1208
 
 
 
 
Results
Pertinent Lab Results:
 Laboratory Tests
 
 
 07/17 07/16
 
 0415 0600
 
Chemistry  
 
  Sodium (137 - 145 mmol/L) 139 138
 
  Potassium (3.5 - 5.1 mmol/L) 5.2  H 5.8  H
 
  Chloride (98 - 107 mmol/L) 106 106
 
  Carbon Dioxide (22 - 30 mmol/L) 22 20  L
 
  Anion Gap (5 - 16) 11 11
 
  BUN (7 - 17 mg/dL) 68  H 67  H
 
  Creatinine (0.5 - 1.0 mg/dL) 2.3  H 2.1  H
 
  Estimated GFR (>60 ml/min) 21  L 23  L
 
  Glucose (65 - 99 mg/dL) 209  H 231  H
 
  Calcium (8.4 - 10.2 mg/dL) 8.6 8.4
 
  Phosphorus (2.5 - 4.5 mg/dL) 4.4 5.5  H
 
  Magnesium (1.6 - 2.3 mg/dL) 2.2 2.1
 
  Total Bilirubin (0.2 - 1.3 mg/dL) 0.5 0.4
 
  AST (14 - 36 U/L) 22 20
 
  ALT (9 - 52 U/L) 28 27
 
  Albumin (3.5 - 5.0 g/dL) 3.2  L 2.8  L
 
Hematology  
 
  CBC w Diff MAN DIFF ORDERED MAN DIFF ORDERED
 
  WBC (4.8 - 10.8 /CUMM) 27.0  H 22.3  H
 
  RBC (4.20 - 5.40 /CUMM) 3.63  L 3.13  L
 
  Hgb (12.0 - 16.0 G/DL) 9.2  L 8.0  L
 
  Hct (37 - 47 %) 29.5  L 25.6  L
 
  MCV (81.0 - 99.0 FL) 81.4 81.7
 
  MCH (27.0 - 31.0 PG) 25.2  L 25.4  L
 
  MCHC (33.0 - 37.0 G/DL) 31.0  L 31.1  L
 
  RDW (11.5 - 14.5 %) 20.9  H 20.5  H
 
  Plt Count (130 - 400 /CUMM) 355 326
 
  MPV (7.4 - 10.4 FL) 9.4 9.5
 
  Segmented Neutrophils (42.2 - 75.2 %) 86  H 95  H
 
  Band Neutrophils (0.0 - 5.0 %) 2 
 
  Lymphocytes (20.5 - 51.1 %) 5  L 2  L
 
  Monocytes (1.7 - 9.3 %) 7 3
 
  Nucleated RBCs (0.0 - 0.0 /100WBC) 3  H 1  H
 
  Platelet Estimate (ADEQUATE) ADEQUATE ADEQUATE
 
  Polychromasia 1+ 1+
 
  Hypochromic-Microcytic  1+
 
  Basophilic Stippling  RARE
 
  Anisocytosis  1+
 
 
 
 
 07/15 07/15
 
 1700 0530
 
Chemistry  
 
  Sodium (137 - 145 mmol/L)  136  L
 
  Potassium (3.5 - 5.1 mmol/L)  5.3  H
 
  Chloride (98 - 107 mmol/L)  103
 
  Carbon Dioxide (22 - 30 mmol/L)  22
 
  Anion Gap (5 - 16)  12
 
  BUN (7 - 17 mg/dL)  62  H
 
  Creatinine (0.5 - 1.0 mg/dL)  2.1  H
 
  Estimated GFR (>60 ml/min)  23  L
 
  BUN/Creatinine Ratio (7 - 25 %)  29.5  H
 
Hematology  
 
  CBC w Diff  MAN DIFF ORDERED
 
  WBC (4.8 - 10.8 /CUMM)  24.6  H
 
  RBC (4.20 - 5.40 /CUMM)  3.28  L
 
  Hgb (12.0 - 16.0 G/DL)  8.5  L
 
  Hct (37 - 47 %)  26.6  L
 
  MCV (81.0 - 99.0 FL)  81.1
 
  MCH (27.0 - 31.0 PG)  25.8  L
 
  MCHC (33.0 - 37.0 G/DL)  31.8  L
 
  RDW (11.5 - 14.5 %)  19.9  H
 
  Plt Count (130 - 400 /CUMM)  351
 
  MPV (7.4 - 10.4 FL)  8.9
 
  Segmented Neutrophils (42.2 - 75.2 %)  95  H
 
  Band Neutrophils (0.0 - 5.0 %)  1
 
  Lymphocytes (20.5 - 51.1 %)  4  L
 
  Platelet Estimate (ADEQUATE)  ADEQUATE
 
  Polychromasia  1+
 
  Hypochromic-Microcytic  1+
 
  Basophilic Stippling  SLIGHT
 
  Anisocytosis  1+
 
Urines  
 
  Urine Color (YEL,AMB,STR) YEL 
 
  Urine Clarity (CLEAR) CLEAR 
 
  Urine pH (5.0 - 8.0) 6.0 
 
  Ur Specific Gravity (1.001 - 1.035) 1.020 
 
  Urine Protein (NEG,<30 MG/DL) NEG 
 
  Urine Ketones (NEG) NEG 
 
  Urine Nitrite (NEG) NEG 
 
  Urine Bilirubin (NEG) NEG 
 
  Urine Urobilinogen (0.1  -  1.0 EU/dl) 0.2 
 
  Ur Leukocyte Esterase (NEG) NEG 
 
  Ur Microscopic EXAM NOT REQUIRED 
 
  Urine Hemoglobin (NEG) NEG 
 
  Urine Glucose (N MG/DL) 100  H 
 
 
 
Imaging/Other Studies:
EXAM TYPE: RAD - XRY-PORTABLE CHEST XRAY
 
EXAMINATION:
XR PORTABLE CHEST
 
CLINICAL INFORMATION:
Bilateral crackles. Follow-up from previous. COPD.
 
COMPARISON:
Several prior chest x-rays, most recent of which is dated 07/14/2018. CT scan
of the chest dated 07/09/2018.
 
TECHNIQUE:
Portable AP semierect view of the chest was obtained.
 
FINDINGS:
EKG leads overlie the chest. The cardiomediastinal silhouette is within
normal limits in size.
 
Low lung volumes are seen with diffuse increased reticular lung markings and
slight thickening of the central airways, consistent with the previously
demonstrated obstructive lung disease and fibrosis. Mild bibasilar
subsegmental atelectasis is seen. No focal consolidation, significant
effusion or pneumothorax is seen.
 
Bony structures are grossly unremarkable.
 
IMPRESSION:
Findings consistent with mild bibasilar subsegmental atelectasis superimposed
upon chronic obstructive lung disease and fibrotic lung changes. No evolving
pneumonia seen.

## 2018-07-17 NOTE — PN- CARDIOLOGY
Subjective
Subjective:
The patient is awake, alert
The events of the last 24 hours as well as telemetry were reviewed.
 
Review of Systems:
The review of systems is negative for chest pains, palpitations nor 
lightheadedness.
The remainder of the 14 point review of systems is noncontributory with the 
exception of above.
 
Objective
Vital Signs and I&Os
Vital Signs
 
 
Date Time Temp Pulse Resp B/P B/P Pulse O2 O2 Flow FiO2
 
     Mean Ox Delivery Rate 
 
07/17 0846      97 Nasal 50% 
 
       Cannula  
 
07/17 0800 97.8 70 24 150/70  99 Nasal 50% 
 
       Cannula  
 
07/17 0400       Nasal 50% 
 
       Cannula  
 
07/17 0129      99 Nasal 50% 
 
       Cannula  
 
07/17 0000      98 Nasal 50% 
 
       Cannula  
 
07/16 2302 97.3 79 14 130/60  98 Nasal 50% 
 
       Cannula  
 
07/16 2224  76  120/68     
 
07/16 2019      95 Nasal 50% 
 
       Cannula  
 
07/16 2000      96 Nasal 50% 
 
       Cannula  
 
07/16 1600      99 Nasal 50% 
 
       Cannula  
 
07/16 1600 97.6 72 23 125/57  98 Nasal 40% 
 
       Cannula  
 
07/16 1226      100 Nasal 50% 
 
       Cannula  
 
07/16 1209       Nasal 45% 
 
       Cannula  
 
07/16 1200      95 Nasal 50% 
 
       Cannula  
 
 
 Intake & Output
 
 
 07/17 1600 07/17 0800 07/17 0000 07/16 1600 07/16 0800 07/16 0000
 
Intake Total  200 240 360 100 780
 
Output Total  800 350 
 
Balance  -600 -110 -40 -500 -220
 
       
 
Intake, IV      280
 
Intake, Oral  200 240 360 100 500
 
Number   0 0  
 
Bowel      
 
Movements      
 
Output, Urine  800 350 
 
Patient 133 lb 133 lb    
 
Weight      
 
Weight  Bed scale    
 
Measurement      
 
Method      
 
 
 
Physical Exam:
General: Nontoxic, no apparent distress.
HEENT: Sclera and conjunctiva within normal limits, without xanthelasmas.
Neck: Carotids 2+ without bruits.
Respiratory: Scattered rhonchi, air movement is good, without accessory 
respiratory muscle use.
Heart: Regular rate and rhythm, without murmurs, without JVD.
Abdomen: Soft, nontender, no masses, normoactive bowel sounds.
Extremities: Without clubbing, cyanosis, without edema.
Neuro: Nonfocal exam, strength, 5 out of 5
Skin: Within normal limits without lesions.
Psych: Mood and affect: Normal
Current Medications:
 Current Medications
 
 
  Sig/Enrique Start time  Last
 
Medication Dose Route Stop Time Status Admin
 
Acetaminophen 650 MG .STK-MED ONE 07/16 1632 DC 
 
  PO 07/16 1633  
 
Acetaminophen 650 MG Q8P PRN 07/08 2100 AC 07/16
 
  PO   1635
 
Albuterol Sulfate 3 ML BID 07/09 2100 AC 07/17
 
  INH   0845
 
Alprazolam 0.5 MG BID 07/16 0945 AC 07/17
 
  PO 07/23 0944  0939
 
Aspirin 81 MG DAILY 07/09 0900 AC 07/17
 
  PO   0939
 
Atorvastatin Calcium 40 MG 1700 07/09 1700 AC 07/16
 
  PO   1634
 
Budesonide/ 2 PUF BID 07/09 1030 AC 07/17
 
Formoterol Fumarate  INH   0939
 
Ezetimibe 10 MG DAILY 07/09 0900 AC 07/17
 
  PO   0939
 
Furosemide 20 MG DAILY 07/12 0900 DC 07/16
 
  PO   0840
 
Heparin Sodium  5,000 UNIT Q8 07/08 2200 AC 07/17
 
(Porcine)  SC   0607
 
Insulin Aspart 0 TIDAC 07/12 1700 AC 07/16
 
  SC   1635
 
Levothyroxine Sodium 0.05 MG 0700 07/10 0700 AC 07/17
 
  PO   0602
 
Metoprolol Succinate 50 MG DAILY 07/09 0900 AC 07/17
 
  PO   0939
 
Mirtazapine 15 MG AT BEDTIME 07/09 0015 AC 07/16
 
  PO   2224
 
Omeprazole 40 MG DAILY AC 07/10 0700 AC 07/17
 
  PO   0602
 
Prednisone 20 MG DAILY 07/17 0900 AC 07/17
 
  PO   0939
 
Ranolazine 1,000 MG BID 07/09 0002 AC 07/17
 
  PO   0939
 
Senna/Docusate Sodium 1 TAB AT BEDTIME AS NEED.. 07/12 1133 AC 
 
  PO   
 
 
 
 
Results
Last 48 Hrs of Labs/Mics:
 Laboratory Tests
 
07/17/18 0415:
Anion Gap 11, Estimated GFR 21  L, Glucose 209  H, Calcium 8.6, Phosphorus 4.4, 
Magnesium 2.2, Total Bilirubin 0.5, AST 22, ALT 28, Albumin 3.2  L, CBC w Diff 
MAN DIFF ORDERED, RBC 3.63  L, MCV 81.4, MCH 25.2  L, MCHC 31.0  L, RDW 20.9  H,
MPV 9.4, Segmented Neutrophils 86  H, Band Neutrophils 2, Lymphocytes 5  L, 
Monocytes 7, Nucleated RBCs 3  H, Platelet Estimate ADEQUATE, Polychromasia 1+
 
07/16/18 0600:
Anion Gap 11, Estimated GFR 23  L, Glucose 231  H, Calcium 8.4, Phosphorus 5.5  
H, Magnesium 2.1, Total Bilirubin 0.4, AST 20, ALT 27, Albumin 2.8  L, CBC w 
Diff MAN DIFF ORDERED, RBC 3.13  L, MCV 81.7, MCH 25.4  L, MCHC 31.1  L, RDW 
20.5  H, MPV 9.5, Segmented Neutrophils 95  H, Lymphocytes 2  L, Monocytes 3, 
Nucleated RBCs 1  H, Platelet Estimate ADEQUATE, Polychromasia 1+, Hypochromic-
Microcytic 1+, Basophilic Stippling RARE, Anisocytosis 1+
 
07/15/18 1700:
Urine Color YEL, Urine Clarity CLEAR, Urine pH 6.0, Ur Specific Gravity 1.020, 
Urine Protein NEG, Urine Ketones NEG, Urine Nitrite NEG, Urine Bilirubin NEG, 
Urine Urobilinogen 0.2, Ur Leukocyte Esterase NEG, Ur Microscopic EXAM NOT 
REQUIRED, Urine Hemoglobin NEG, Urine Glucose 100  H
 Microbiology
07/15 1700  URINE ROUT: Urine Culture - COMP
 
 
 
Assessment/Plan
Assessment/Plan
68-year-old woman with a past medical history of coronary artery disease (PCI in
2001 in 2014, as well as drug-eluting stents to the RCA in 2016) COPD, social 
lung disease, lung CA status post gamma knife radiation, paroxysmal atrial 
fibrillation (not on anticoagulation secondary to GI bleeds and anemia), chronic
kidney disease and hypertension.  She presents to our hospital with symptoms of 
increasing severe dyspnea and weakness, which has been refractory to her rescue 
asthma inhalers.  She has well has a noted approximate 10 pound weight gain over
the past several weeks.
 
Dyspnea:
The patient presents with dyspnea which is most consistent with a combined 
underlying COPD exacerbation and likely increasing fluid overload.  A recent 
echocardiogram performed demonstrated preserved LV systolic function, and a 
repeat of the same is currently not necessary.  
We will continue treatment aggressively as per pulmonary.  She appears overall 
euvolemic and I would therefore attempt to maintain the same..
 
Anemia:
The patient has been receiving Procrit for her chronic anemia.  She feels 
overall improved following a transfusion
 
Atrial fibrillation:
Patient has known atrial fibrillation and is not anticoagulated secondary to a 
history of GI bleeds and anemia.  We will continue to maintain rate control.
Continue telemetry? No

## 2018-07-17 NOTE — EVENT NOTE
Event Note
Event Note:
Patient is admitted for respiratory failure and will benefit greatly from 
pulmonary rehabilitation acutely. During the admission for rehabilitation she 
will not be able to initiate therapy for her malignancy and/or attend oncologic 
outpatient needs.

## 2018-07-18 VITALS — DIASTOLIC BLOOD PRESSURE: 58 MMHG | SYSTOLIC BLOOD PRESSURE: 104 MMHG

## 2018-07-18 VITALS — SYSTOLIC BLOOD PRESSURE: 130 MMHG | DIASTOLIC BLOOD PRESSURE: 50 MMHG

## 2018-07-18 VITALS — DIASTOLIC BLOOD PRESSURE: 48 MMHG | SYSTOLIC BLOOD PRESSURE: 110 MMHG

## 2018-07-18 VITALS — SYSTOLIC BLOOD PRESSURE: 90 MMHG | DIASTOLIC BLOOD PRESSURE: 50 MMHG

## 2018-07-18 LAB
ERYTHROCYTE [DISTWIDTH] IN BLOOD BY AUTOMATED COUNT: 21.3 % (ref 11.5–14.5)
HCT VFR BLD CALC: 28.5 % (ref 37–47)
MCH RBC QN AUTO: 24.9 PG (ref 27–31)
MCHC RBC AUTO-ENTMCNC: 30.7 G/DL (ref 33–37)
MCV RBC AUTO: 81 FL (ref 81–99)
PLATELET # BLD: 354 /CUMM (ref 130–400)
PMV BLD AUTO: 9.5 FL (ref 7.4–10.4)
RED BLOOD CELL CT: 3.52 /CUMM (ref 4.2–5.4)
WBC # BLD AUTO: 25.7 /CUMM (ref 4.8–10.8)

## 2018-07-18 NOTE — PN- NEPHROLOGY
Assessment/Plan Nephrology
Assessment:
Stage III CKD - 2/2 hypertensive nephrosclerosis - baseline SCr is 1.5-2.2.  The
fact that she was just above the upper end of her normal may be 2/2 relative 
intravascular volume depletion (was on QOD lasix as an outpatient and had gotten
daily here) - now her SCr has improved with holding her lasix for a day.  
Discussed with her that given her severe lung disease, there may need to be some
tradeoff in terms of worsened renal function for improved pulmonary function.  I
think it'd be reasonable to hold diuretics again today.
 
Hyperkalemia - Tendency in the past and currently likely 2/2 catabolic state.  
On supplemental bicarb.
 
Anemia - On outpatient epogen which she received on 7/9 and gets q3 weeks - 
target Hg 10-11.
Suggestion:
-Hold lasix today - please check with me tomorrow before ordering lasix
-PRN kayexalate for K>5.7
-Cont sodium bicarb 1300mg BID
-Will need next dose of Epogen when due if remains in-house (gets q3 weeks)
 
Please call  with ?'s
 
 
Subjective
Subjective:
SCr 2.0
1500cc UOP
Last dose of lasix on 7/16
Breathing OK
 
Objective
Vital Signs and I&Os
Vital Signs
 
 
Date Time Temp Pulse Resp B/P B/P Pulse O2 O2 Flow FiO2
 
     Mean Ox Delivery Rate 
 
07/18 0956  72  130/50     
 
07/18 0955  72  130/50     
 
07/18 0857      97 Nasal 50% 
 
       Cannula  
 
07/18 0800      100 Nasal 50% 
 
       Cannula  
 
07/18 0800 97.8 72 24 130/50  100 Nasal 50% 
 
       Cannula  
 
07/18 0400      97 Nasal 50% 
 
       Cannula  
 
07/18 0103      98 Nasal 50% 
 
       Cannula  
 
07/18 0000      98 Nasal 50% 
 
       Cannula  
 
07/17 2300 97.3 78 14 110/48  98 Nasal 50% 
 
       Cannula  
 
07/17 2152  75 28 120/80     
 
07/17 2000      97 Nasal 50% 
 
       Cannula  
 
07/17 1918      93 Nasal 50% 
 
       Cannula  
 
07/17 1600      96 Nasal 50% 
 
       Cannula  
 
07/17 1600 98.2 70 22 120/58  96 Nasal 50% 
 
       Cannula  
 
07/17 1200 98.7 66 24 136/60  97 Nasal 50% 
 
       Cannula  
 
 
 Intake & Output
 
 
 07/18 1600 07/18 0400 07/17 1600 07/17 0400 07/16 1600 07/16 0400
 
Intake Total 60 120 760 240 460 780
 
Output Total  350 1000 1000
 
Balance -740 -280 -340 -110 -540 -220
 
       
 
Intake, IV      280
 
Intake, Oral 60 120 760 240 460 500
 
Number 0 1  0 0 
 
Bowel      
 
Movements      
 
Output, Urine  350 1000 1000
 
Patient   133 lb   
 
Weight      
 
Weight   Bed scale   
 
Measurement      
 
Method      
 
 
 
Physical Exam:
Gen - OK appearing
HEENT - supple, on high flow nasal cannula
CV - RRR, no m/r/g
Chest - decreased BS b/l, no w/r/r
Abd - soft, NTND
Ext - warm, no edema
Neuro - AOX3, grossly nonfocal
Current Medications:
 Current Medications
 
 
  Sig/Enrique Start time  Last
 
Medication Dose Route Stop Time Status Admin
 
Acetaminophen 650 MG Q8P PRN 07/08 2100 AC 07/16
 
  PO   1635
 
Albuterol Sulfate 3 ML BID 07/09 2100 AC 07/18
 
  INH   0847
 
Alprazolam 0.5 MG ONCE ONE 07/17 1445 DC 07/17
 
  PO 07/17 1446  1442
 
Alprazolam 0.5 MG BID 07/16 0945 AC 07/18
 
  PO 07/23 0944  0956
 
Aspirin 81 MG DAILY 07/09 0900 AC 07/18
 
  PO   0956
 
Atorvastatin Calcium 40 MG 1700 07/09 1700 AC 07/17
 
  PO   1627
 
Budesonide/ 2 PUF BID 07/09 1030 AC 07/18
 
Formoterol Fumarate  INH   0956
 
Ezetimibe 10 MG DAILY 07/09 0900 AC 07/18
 
  PO   0955
 
Furosemide 20 MG DAILY 07/18 0900 AC 
 
  PO   
 
Heparin Sodium  5,000 UNIT Q8 07/08 2200 AC 07/18
 
(Porcine)  SC   0637
 
Insulin Aspart 0 TIDAC 07/12 1700 AC 07/17
 
  SC   1731
 
Levothyroxine Sodium 0.05 MG 0700 07/10 0700 AC 07/18
 
  PO   0636
 
Metoprolol Succinate 50 MG DAILY 07/09 0900 AC 07/18
 
  PO   0956
 
Mirtazapine 15 MG AT BEDTIME 07/09 0015 AC 07/17
 
  PO   2154
 
Omeprazole 40 MG DAILY AC 07/10 0700 AC 07/18
 
  PO   0636
 
Prednisone 20 MG DAILY 07/17 0900 AC 07/18
 
  PO   0955
 
Ranolazine 1,000 MG BID 07/09 0002 AC 07/18
 
  PO   0955
 
Senna/Docusate Sodium 1 TAB AT BEDTIME AS NEED.. 07/12 1133 AC 07/17
 
  PO   1208
 
Sodium Bicarbonate 1,300 MG BID 07/17 1400 AC 07/18
 
  PO   0955
 
 
 
 
Results
Pertinent Lab Results:
 Laboratory Tests
 
 
 07/18 07/17
 
 0630 0415
 
Chemistry  
 
  Sodium (137 - 145 mmol/L) 141 139
 
  Potassium (3.5 - 5.1 mmol/L) 5.3  H 5.2  H
 
  Chloride (98 - 107 mmol/L) 110  H 106
 
  Carbon Dioxide (22 - 30 mmol/L) 19  L 22
 
  Anion Gap (5 - 16) 11 11
 
  BUN (7 - 17 mg/dL) 63  H 68  H
 
  Creatinine (0.5 - 1.0 mg/dL) 2.0  H 2.3  H
 
  Estimated GFR (>60 ml/min) 25  L 21  L
 
  Glucose (65 - 99 mg/dL) 146  H 209  H
 
  Calcium (8.4 - 10.2 mg/dL) 8.5 8.6
 
  Phosphorus (2.5 - 4.5 mg/dL) 4.3 4.4
 
  Magnesium (1.6 - 2.3 mg/dL) 2.1 2.2
 
  Total Bilirubin (0.2 - 1.3 mg/dL) 0.5 0.5
 
  AST (14 - 36 U/L) 27 22
 
  ALT (9 - 52 U/L) 24 28
 
  Albumin (3.5 - 5.0 g/dL) 2.9  L 3.2  L
 
Hematology  
 
  CBC w Diff MAN DIFF ORDERED MAN DIFF ORDERED
 
  WBC (4.8 - 10.8 /CUMM) 25.7  H 27.0  H
 
  RBC (4.20 - 5.40 /CUMM) 3.52  L 3.63  L
 
  Hgb (12.0 - 16.0 G/DL) 8.7  L 9.2  L
 
  Hct (37 - 47 %) 28.5  L 29.5  L
 
  MCV (81.0 - 99.0 FL) 81.0 81.4
 
  MCH (27.0 - 31.0 PG) 24.9  L 25.2  L
 
  MCHC (33.0 - 37.0 G/DL) 30.7  L 31.0  L
 
  RDW (11.5 - 14.5 %) 21.3  H 20.9  H
 
  Plt Count (130 - 400 /CUMM) 354 355
 
  MPV (7.4 - 10.4 FL) 9.5 9.4
 
  Segmented Neutrophils (42.2 - 75.2 %) Pending 86  H
 
  Band Neutrophils (0.0 - 5.0 %)  2
 
  Lymphocytes (20.5 - 51.1 %)  5  L
 
  Monocytes (1.7 - 9.3 %)  7
 
  Nucleated RBCs (0.0 - 0.0 /100WBC)  3  H
 
  Platelet Estimate (ADEQUATE)  ADEQUATE
 
  Polychromasia  1+ 07/16 07/15
 
 0600 1700
 
Chemistry  
 
  Sodium (137 - 145 mmol/L) 138 
 
  Potassium (3.5 - 5.1 mmol/L) 5.8  H 
 
  Chloride (98 - 107 mmol/L) 106 
 
  Carbon Dioxide (22 - 30 mmol/L) 20  L 
 
  Anion Gap (5 - 16) 11 
 
  BUN (7 - 17 mg/dL) 67  H 
 
  Creatinine (0.5 - 1.0 mg/dL) 2.1  H 
 
  Estimated GFR (>60 ml/min) 23  L 
 
  Glucose (65 - 99 mg/dL) 231  H 
 
  Calcium (8.4 - 10.2 mg/dL) 8.4 
 
  Phosphorus (2.5 - 4.5 mg/dL) 5.5  H 
 
  Magnesium (1.6 - 2.3 mg/dL) 2.1 
 
  Total Bilirubin (0.2 - 1.3 mg/dL) 0.4 
 
  AST (14 - 36 U/L) 20 
 
  ALT (9 - 52 U/L) 27 
 
  Albumin (3.5 - 5.0 g/dL) 2.8  L 
 
Hematology  
 
  CBC w Diff MAN DIFF ORDERED 
 
  WBC (4.8 - 10.8 /CUMM) 22.3  H 
 
  RBC (4.20 - 5.40 /CUMM) 3.13  L 
 
  Hgb (12.0 - 16.0 G/DL) 8.0  L 
 
  Hct (37 - 47 %) 25.6  L 
 
  MCV (81.0 - 99.0 FL) 81.7 
 
  MCH (27.0 - 31.0 PG) 25.4  L 
 
  MCHC (33.0 - 37.0 G/DL) 31.1  L 
 
  RDW (11.5 - 14.5 %) 20.5  H 
 
  Plt Count (130 - 400 /CUMM) 326 
 
  MPV (7.4 - 10.4 FL) 9.5 
 
  Segmented Neutrophils (42.2 - 75.2 %) 95  H 
 
  Lymphocytes (20.5 - 51.1 %) 2  L 
 
  Monocytes (1.7 - 9.3 %) 3 
 
  Nucleated RBCs (0.0 - 0.0 /100WBC) 1  H 
 
  Platelet Estimate (ADEQUATE) ADEQUATE 
 
  Polychromasia 1+ 
 
  Hypochromic-Microcytic 1+ 
 
  Basophilic Stippling RARE 
 
  Anisocytosis 1+ 
 
Urines  
 
  Urine Color (YEL,AMB,STR)  YEL
 
  Urine Clarity (CLEAR)  CLEAR
 
  Urine pH (5.0 - 8.0)  6.0
 
  Ur Specific Gravity (1.001 - 1.035)  1.020
 
  Urine Protein (NEG,<30 MG/DL)  NEG
 
  Urine Ketones (NEG)  NEG
 
  Urine Nitrite (NEG)  NEG
 
  Urine Bilirubin (NEG)  NEG
 
  Urine Urobilinogen (0.1  -  1.0 EU/dl)  0.2
 
  Ur Leukocyte Esterase (NEG)  NEG
 
  Ur Microscopic  EXAM NOT REQUIRED
 
  Urine Hemoglobin (NEG)  NEG
 
  Urine Glucose (N MG/DL)  100  H
 
 
 
Imaging/Other Studies:
None new

## 2018-07-18 NOTE — PN- CARDIOLOGY
Subjective
Subjective:
The patient is awake, alert, feels improved from a respiratory standpoint
The events of the last 24 hours as well as telemetry were reviewed.
 
Review of Systems:
The review of systems is negative for chest pains, palpitations nor 
lightheadedness.
The remainder of the 14 point review of systems is noncontributory with the 
exception of above.
 
Objective
Vital Signs and I&Os
Vital Signs
 
 
Date Time Temp Pulse Resp B/P B/P Pulse O2 O2 Flow FiO2
 
     Mean Ox Delivery Rate 
 
07/18 0956  72  130/50     
 
07/18 0955  72  130/50     
 
07/18 0857      97 Nasal 50% 
 
       Cannula  
 
07/18 0800      100 Nasal 50% 
 
       Cannula  
 
07/18 0800 97.8 72 24 130/50  100 Nasal 50% 
 
       Cannula  
 
07/18 0400      97 Nasal 50% 
 
       Cannula  
 
07/18 0103      98 Nasal 50% 
 
       Cannula  
 
07/18 0000      98 Nasal 50% 
 
       Cannula  
 
07/17 2300 97.3 78 14 110/48  98 Nasal 50% 
 
       Cannula  
 
07/17 2152  75 28 120/80     
 
07/17 2000      97 Nasal 50% 
 
       Cannula  
 
07/17 1918      93 Nasal 50% 
 
       Cannula  
 
07/17 1600      96 Nasal 50% 
 
       Cannula  
 
07/17 1600 98.2 70 22 120/58  96 Nasal 50% 
 
       Cannula  
 
07/17 1200 98.7 66 24 136/60  97 Nasal 50% 
 
       Cannula  
 
 
 Intake & Output
 
 
 07/18 1600 07/18 0800 07/18 0000 07/17 1600 07/17 0800 07/17 0000
 
Intake Total  60 120 560 200 240
 
Output Total  800 400 300 800 350
 
Balance  -740 -280 260 -600 -110
 
       
 
Intake, Oral  60 120 560 200 240
 
Number  0 1   0
 
Bowel      
 
Movements      
 
Output, Urine  800 400 300 800 350
 
Patient    133 lb 133 lb 
 
Weight      
 
Weight     Bed scale 
 
Measurement      
 
Method      
 
 
 
Physical Exam:
General: Nontoxic, no apparent distress.
HEENT: Sclera and conjunctiva within normal limits, without xanthelasmas.
Neck: Carotids 2+ without bruits.
Respiratory: Diffuse rhonchi, air movement is decreased throughout, without 
accessory respiratory muscle use.
Heart: Regular rate and rhythm, without murmurs, without JVD.
Abdomen: Soft, nontender, no masses, normoactive bowel sounds.
Extremities: Without clubbing, cyanosis, without edema.
Neuro: Nonfocal exam, strength, 5 out of 5
Skin: Within normal limits without lesions.
Psych: Mood and affect: Normal
Current Medications:
 Current Medications
 
 
  Sig/Enrique Start time  Last
 
Medication Dose Route Stop Time Status Admin
 
Acetaminophen 650 MG Q8P PRN 07/08 2100 AC 07/16
 
  PO   1635
 
Albuterol Sulfate 3 ML BID 07/09 2100 AC 07/18
 
  INH   0847
 
Alprazolam 0.5 MG ONCE ONE 07/17 1445 DC 07/17
 
  PO 07/17 1446  1442
 
Alprazolam 0.5 MG BID 07/16 0945 AC 07/18
 
  PO 07/23 0944  0956
 
Aspirin 81 MG DAILY 07/09 0900 AC 07/18
 
  PO   0956
 
Atorvastatin Calcium 40 MG 1700 07/09 1700 AC 07/17
 
  PO   1627
 
Budesonide/ 2 PUF BID 07/09 1030 AC 07/18
 
Formoterol Fumarate  INH   0956
 
Ezetimibe 10 MG DAILY 07/09 0900 AC 07/18
 
  PO   0955
 
Furosemide 20 MG DAILY 07/18 0900 AC 
 
  PO   
 
Heparin Sodium  5,000 UNIT Q8 07/08 2200 AC 07/18
 
(Porcine)  SC   0637
 
Insulin Aspart 0 TIDAC 07/12 1700 AC 07/17
 
  SC   1731
 
Levothyroxine Sodium 0.05 MG 0700 07/10 0700 AC 07/18
 
  PO   0636
 
Metoprolol Succinate 50 MG DAILY 07/09 0900 AC 07/18
 
  PO   0956
 
Mirtazapine 15 MG AT BEDTIME 07/09 0015 AC 07/17
 
  PO   2154
 
Omeprazole 40 MG DAILY AC 07/10 0700 AC 07/18
 
  PO   0636
 
Prednisone 20 MG DAILY 07/17 0900 AC 07/18
 
  PO   0955
 
Ranolazine 1,000 MG BID 07/09 0002 AC 07/18
 
  PO   0955
 
Senna/Docusate Sodium 1 TAB AT BEDTIME AS NEED.. 07/12 1133 AC 07/17
 
  PO   1208
 
Sodium Bicarbonate 1,300 MG BID 07/17 1400 AC 07/18
 
  PO   0955
 
 
 
 
Results
Last 48 Hrs of Labs/Mics:
 Laboratory Tests
 
07/18/18 0630:
Anion Gap 11, Estimated GFR 25  L, Glucose 146  H, Calcium 8.5, Phosphorus 4.3, 
Magnesium 2.1, Total Bilirubin 0.5, AST 27, ALT 24, Albumin 2.9  L, CBC w Diff 
Pending, WBC Pending, RBC Pending, Hgb Pending, Hct Pending, MCV Pending, MCH 
Pending, MCHC Pending, RDW Pending, Plt Count Pending, MPV Pending
 
07/17/18 0415:
Anion Gap 11, Estimated GFR 21  L, Glucose 209  H, Calcium 8.6, Phosphorus 4.4, 
Magnesium 2.2, Total Bilirubin 0.5, AST 22, ALT 28, Albumin 3.2  L, CBC w Diff 
MAN DIFF ORDERED, RBC 3.63  L, MCV 81.4, MCH 25.2  L, MCHC 31.0  L, RDW 20.9  H,
MPV 9.4, Segmented Neutrophils 86  H, Band Neutrophils 2, Lymphocytes 5  L, 
Monocytes 7, Nucleated RBCs 3  H, Platelet Estimate ADEQUATE, Polychromasia 1+
 
 
Assessment/Plan
Assessment/Plan
68-year-old woman with a past medical history of coronary artery disease (PCI in
2001 in 2014, as well as drug-eluting stents to the RCA in 2016) COPD, social 
lung disease, lung CA status post gamma knife radiation, paroxysmal atrial 
fibrillation (not on anticoagulation secondary to GI bleeds and anemia), chronic
kidney disease and hypertension.  She presents to our hospital with symptoms of 
increasing severe dyspnea and weakness, which has been refractory to her rescue 
asthma inhalers.  She has well has a noted approximate 10 pound weight gain over
the past several weeks.
 
Dyspnea:
The patient presented with dyspnea which was most consistent with a combined 
underlying COPD exacerbation and likely increasing fluid overload.  Following 
diuresis, she currently appears euvolemic and I would attempt to maintain the 
same.  
A recent echocardiogram performed demonstrated preserved LV systolic function, 
and a repeat of the same is currently not necessary.  
We will continue treatment aggressively as per pulmonary.
 
Anemia:
The patient has been receiving Procrit for her chronic anemia.  She feels 
overall improved following a transfusion
 
Atrial fibrillation:
Patient has known atrial fibrillation and is not anticoagulated secondary to a 
history of GI bleeds and anemia.  We will continue to maintain rate control.
Continue telemetry? No

## 2018-07-18 NOTE — PN- RESIDENT CRCU
Bo Sainz 18 0734:
Subjective
HPI/CRCU Issues:
#End stage COPD/COPD Exacerbation
#Chronic anemia due to CKD
#Paroxysmal atrial fibrillation
#HTN
#CKD stage III 2/2 hypertensive nephrosclerosis
#Anxiety
#Squamous cell lung ca
24 Hour Events:
Pt seen and examined at bedside today. She still reports dyspnea on exertion but
her LE pain is improved from yesterday. 
 
Objective
 
Vital Signs & I&O
Last 8 Hrs of Vitals and I&O:
 Laboratory Tests
 
18 0630:
Anion Gap 11, Estimated GFR 25  L, Glucose 146  H, Calcium 8.5, Phosphorus 4.3, 
Magnesium 2.1, Total Bilirubin 0.5, AST 27, ALT 24, Albumin 2.9  L, CBC w Diff 
MAN DIFF ORDERED, RBC 3.52  L, MCV 81.0, MCH 24.9  L, MCHC 30.7  L, RDW 21.3  H,
MPV 9.5, Segmented Neutrophils Pending
 Vital Signs
 
 
Date Time Temp Pulse Resp B/P B/P Pulse O2 O2 Flow FiO2
 
     Mean Ox Delivery Rate 
 
 0956  72  130/50     
 
 0955  72  130/50     
 
 0857      97 Nasal 50% 
 
       Cannula  
 
 0800      100 Nasal 50% 
 
       Cannula  
 
 0800 97.8 72 24 130/50  100 Nasal 50% 
 
       Cannula  
 
 0400      97 Nasal 50% 
 
       Cannula  
 
 0103      98 Nasal 50% 
 
       Cannula  
 
 0000      98 Nasal 50% 
 
       Cannula  
 
 2300 97.3 78 14 110/48  98 Nasal 50% 
 
       Cannula  
 
 2152  75 28 120/80     
 
 2000      97 Nasal 50% 
 
       Cannula  
 
 1918      93 Nasal 50% 
 
       Cannula  
 
 1600      96 Nasal 50% 
 
       Cannula  
 
 1600 98.2 70 22 120/58  96 Nasal 50% 
 
       Cannula  
 
 1200 98.7 66 24 136/60  97 Nasal 50% 
 
       Cannula  
 
 
 Intake & Output
 
 
  1600  0800  0000
 
Intake Total  60 120
 
Output Total  800 400
 
Balance  -740 -280
 
    
 
Intake, Oral  60 120
 
Number  0 1
 
Bowel   
 
Movements   
 
Output, Urine  800 400
 
 
 
 
Exam
General Appearance: no apparent distress, alert, awake
Head: atraumatic, normal appearance
Neck: normal inspection, supple
Respiratory: normal breath sounds, chest non-tender, respiratory distress (on 
exertion)
Cardiovascular: regular rate/rhythm
Gastrointestinal: normal bowel sounds, soft, non-tender, no organomegaly
Extremities: normal inspection, normal capillary refill
Cranial Nerves: normal hearing, normal speech
Current Medications:
 Current Medications
 
 
  Sig/Enrique Start time  Last
 
Medication Dose Route Stop Time Status Admin
 
Acetaminophen 650 MG Q8P PRN  2100 AC 
 
  PO   1635
 
Albuterol Sulfate 3 ML BID  2100 AC 
 
  INH   0847
 
Alprazolam 0.5 MG ONCE ONE  1445 DC 
 
  PO  1446  1442
 
Alprazolam 0.5 MG BID  0945 AC 
 
  PO  0944  0956
 
Aspirin 81 MG DAILY  0900 AC 
 
  PO   0956
 
Atorvastatin Calcium 40 MG 1700  1700 AC 
 
  PO   1627
 
Budesonide/ 2 PUF BID  1030 AC 
 
Formoterol Fumarate  INH   0956
 
Ezetimibe 10 MG DAILY  0900 AC 
 
  PO   0955
 
Furosemide 20 MG DAILY  0900 AC 
 
  PO   
 
Heparin Sodium  5,000 UNIT Q8  2200 AC 
 
(Porcine)  SC   0637
 
Insulin Aspart 0 TIDAC  1700 AC 
 
  SC   1731
 
Levothyroxine Sodium 0.05 MG 0700 07/10 0700 AC 
 
  PO   0636
 
Metoprolol Succinate 50 MG DAILY  0900 AC 
 
  PO   0956
 
Mirtazapine 15 MG AT BEDTIME  0015 AC 
 
  PO   2154
 
Omeprazole 40 MG DAILY AC 07/10 0700 AC 
 
  PO   0636
 
Prednisone 20 MG DAILY  0900 AC 
 
  PO   0955
 
Ranolazine 1,000 MG BID  0002 AC 
 
  PO   0955
 
Senna/Docusate Sodium 1 TAB AT BEDTIME AS NEED..  1133 AC 
 
  PO   1208
 
Sodium Bicarbonate 1,300 MG BID  1400 AC 
 
  PO   0955
 
 
 
US Findings:

EXAM TYPE: US - US-EXT BILAT VENOUS DOPPLER
 
EXAMINATION:
US TRIPLEX OF LOWER EXTREMITIES, BILATERAL
 
CLINICAL INFORMATION:
Bilateral lower extremity pain and tenderness
 
COMPARISON:
None
 
TECHNIQUE:
Color-flow triplex imaging with spectral analysis and compression Doppler
were performed on the lower extremities.
 
FINDINGS:
Respiratory variation, normal compression and augmented flow are noted
throughout the lower extremities. The visualized common femoral vein,
superficial femoral vein, profunda femoral vein, popliteal vein and midcalf
peroneal and posterior tibial venous segments show no evidence of deep venous
thrombosis.
 
There is no Baker's cyst.
 
IMPRESSION:
No evidence of deep venous thrombosis involving the bilateral lower
extremities.
 
Impression/Plan
 
Impression/Problem List
Impression:
69 y/o F with a PMH of CAD, HTN, HLD, Squamous lung cell Ca, CKD, and paroxysmal
afib not on anticoagulation due to previous GI bleed that presented to the ED c/
o progressive dyspnea after not achieving relief using her rescue inhaler. She 
was admitted to the telemetry unit with an admitting diagnosis of COPD 
exacerbation and subsequently transfered to the ICU for closer monitoring due to
her desaturation on .  
 
ASSESSMENT AND PLAN
#End stage COPD/COPD Exacerbation
#Chronic anemia due to CKD
#Paroxysmal atrial fibrillation
#HTN
#CKD stage III 2/2 hypertensive nephrosclerosis
#Anxiety
#Squamous cell lung ca
 
End-stage COPD / COPD exacerbation/ Hypoxemic respiratory failure
Pt with a chest CT () showing diffuse pulmonary emphysema with chronic 
interstitial pneumonitis. Prior admit show similar complains of SOB - her 
symptoms are most likely related to her severe emphysematous picture that is 
worsened by her heightened state of anxiety that induces hyperventilation as per
pt. Pt is off antibiotics, her upward trending leukocytosis is most likely 
related to her steroid use, latest 25.2.  Steroids have been switched to PO. F/u
CXR reported no pneumonia findings. Patient still reporting dyspnea on exertion.
98% O2 sat on high flow 45% O2. Plan is to transfer to a pulmonary rehab 
facility.
-TRC/Continue neb treatments
-Symbicort 2 puffs q12
-Prednisone 20 mg PO
 
 
Paroxysmal A-fib / HTN
Pt currently off anticoagulation due to episodes of GI bleeding and chronic 
anemia due to CKD. Her BP has been controlled on metoprolol, cardiology is 
following. pt c/o leg pain/cramping, though no erythema is noted or pain is 
ellicited on palpation, patient does note improvement. B/L Venous doppler r/o 
DVT.
-Off anticoag 
-Metoprolol 50 mg qDaily
-Continue ASA/Statin/Ezetimibe
 
Chronic Anemia due to CKD, s/p 1U PRBC
Pt with Hb 8.7, baseline around 8-10. Most likely related to CKD rather than 
massive acute blood loss; She is on EPO at home, 1 dose was given during her 
admission. Next dose of EPO as per regular schedule if pt remains admitted. 
-f/u CBC
 
CKD stage III 2/2 hypertensive nephrosclerosis
Pt being followed by Dr. Chacon, currently with K at 5.8, with a baseline Cr 
1.5-2.2 and increasing BUN. Latest bicarb was 20 -- NaHCO3 1300 mg BID will be 
added. 
-<2g K in diet
-Sodium Bicarb 1300 mg BID
-Kayexalate PRN if K>5.7
-Continue trending
-f/u nephros recommendation
-Furosemide held
 
Anxiety
Pt described herself as a very anxious person, especially with regards to her 
overall health.  She is currently on Alprazolam 0.5 mg at home. As per the pt, 
being in the ICU has increased her anxiety and feels discomfort as a result. 
Home dose of alprazolam restarted.
-Alprazolam 0.5 mg BID
 
Squamous cell lung CA 
S/p post gamma knife radiation, followed by Dr. Corcoran - latest visit received a
referral to an oncologist to evaluate a new left-sided lung mass that was 
positive for malignancy on biopsy.
 
 
DNR
DVT PPX: ALPS, sq heparin
Regular Diet
Problem List:
 1. COPD (chronic obstructive pulmonary disease)
 
Pain Ratin
Tomorrow's Labs & Rationales:
cbc
 
Plan
DVT/Prophylaxis: mechanical, pharmacological
 
 
Nilo NEWBERRY,Raghav 18 1701:
Attending MD Review Statement
 
Attending Sign Off
Attending Cosign Statement:
I have: examined this patient, [facility], personally reviewd 
images, discussd w/resident/PA/NP, discussed mgmt plan w/brijesh, discussed mgmt 
plan w/CM, discussed mgmt plan w/pt, agreed w/resident/PA/NP, amended to note. 
Other Findings:
Impression
68 year old woman
 
ICU stay for hypoxemic acute on chronic respiratory failure
leukocytosis
squamous cell lung ca recently dx
anemia
ckd
 
Plan
-continue po prednisone
-monitor wbc
-remains on high flow oxygen, significant exertional dyspnea, 50% fio2
-off abx, can be reactive to steroids, will monitor off abx at this time, no 
evidence of pna at this time
-nephrology follow up
-bid xanax
-trc/nebs
-check LE dopplers
 
DVT prophylaxis at all times
 
DNR - NOT DNI
 
TTS 35 min

## 2018-07-19 VITALS — SYSTOLIC BLOOD PRESSURE: 112 MMHG | DIASTOLIC BLOOD PRESSURE: 58 MMHG

## 2018-07-19 VITALS — SYSTOLIC BLOOD PRESSURE: 120 MMHG | DIASTOLIC BLOOD PRESSURE: 48 MMHG

## 2018-07-19 VITALS — SYSTOLIC BLOOD PRESSURE: 124 MMHG | DIASTOLIC BLOOD PRESSURE: 52 MMHG

## 2018-07-19 VITALS — DIASTOLIC BLOOD PRESSURE: 60 MMHG | SYSTOLIC BLOOD PRESSURE: 124 MMHG

## 2018-07-19 LAB
ERYTHROCYTE [DISTWIDTH] IN BLOOD BY AUTOMATED COUNT: 21.1 % (ref 11.5–14.5)
HCT VFR BLD CALC: 27.2 % (ref 37–47)
MCH RBC QN AUTO: 25.4 PG (ref 27–31)
MCHC RBC AUTO-ENTMCNC: 31.1 G/DL (ref 33–37)
MCV RBC AUTO: 81.4 FL (ref 81–99)
PLATELET # BLD: 306 /CUMM (ref 130–400)
PMV BLD AUTO: 9.7 FL (ref 7.4–10.4)
RED BLOOD CELL CT: 3.34 /CUMM (ref 4.2–5.4)
WBC # BLD AUTO: 22.2 /CUMM (ref 4.8–10.8)

## 2018-07-19 NOTE — PN- RESIDENT CRCU
Bo Sainz 18 0739:
Subjective
HPI/CRCU Issues:
#End stage COPD/COPD Exacerbation
#Chronic anemia due to CKD
#Paroxysmal atrial fibrillation
#HTN
#CKD stage III 2/2 hypertensive nephrosclerosis
#Anxiety
#Squamous cell lung ca
24 Hour Events:
Pt seen and examined at bedside. Dyspnea on exertion remains the same, pt states
feeling at her baseline. No other acute complains. 
 
Objective
 
Vital Signs & I&O
Last 8 Hrs of Vitals and I&O:
 Laboratory Tests
 
18 0438:
Anion Gap 12, Estimated GFR 28  L, Glucose 111  H, Calcium 8.7, Phosphorus 4.2, 
Magnesium 2.0, Total Bilirubin 0.6, AST 21, ALT 32, Albumin 3.0  L, CBC w Diff 
MAN DIFF ORDERED, RBC 3.34  L, MCV 81.4, MCH 25.4  L, MCHC 31.1  L, RDW 21.1  H,
MPV 9.7, Segmented Neutrophils 92  H, Band Neutrophils 2, Lymphocytes 5  L, 
Monocytes 1  L, Platelet Estimate ADEQUATE, Hypochromic-Microcytic 2+, 
Anisocytosis 1+, Schistocytes 1+
 Vital Signs
 
 
Date Time Temp Pulse Resp B/P B/P Pulse O2 O2 Flow FiO2
 
     Mean Ox Delivery Rate 
 
 0916  66  124/52     
 
 0916  66  124/52     
 
 0800      99 Nasal 50% 
 
       Cannula  
 
 0800 98.6 66 24 124/52  99 Nasal 50% 
 
       Cannula  
 
 0400      99 Nasal 50% 
 
       Cannula  
 
 0058      100 Nasal 50% 
 
       Cannula  
 
 0000      99 Nasal 50% 
 
       Cannula  
 
 2300 97.0 81 14 110/48  99 Nasal 50% 
 
       Cannula  
 
 2152  84 28 110/60     
 
 2110      98 Nasal 50% 
 
       Cannula  
 
 2000      97 Nasal 50% 
 
       Cannula  
 
 1600      98 Nasal 50% 
 
       Cannula  
 
 1600 97.8 81 18 104/58  98 Nasal 50% 
 
       Cannula  
 
 1200      99 Nasal 50% 
 
       Cannula  
 
 1200 97.3 73 21 90/50  99 Nasal 50% 
 
       Cannula  
 
 
 Intake & Output
 
 
  1600  0800  0000
 
Intake Total  120 120
 
Output Total  800 400
 
Balance  -680 -280
 
    
 
Intake, Oral  120 120
 
Number  0 2
 
Bowel   
 
Movements   
 
Output, Urine  800 400
 
Patient  134 lb 
 
Weight   
 
Weight  Bed scale 
 
Measurement   
 
Method   
 
 
 Intake & Output
 
 
  1600
 
Intake Total 
 
Output Total 
 
Balance 
 
  
 
Patient 134 lb
 
Weight 
 
Weight Bed scale
 
Measurement 
 
Method 
 
 
 
 
Exam
General Appearance: no apparent distress, alert, awake
Head: atraumatic, normal appearance
Neck: normal inspection, supple
Respiratory: chest non-tender, quiet respiration, respiratory distress on 
exertion
Cardiovascular: regular rate/rhythm, normal peripheral pulses
Gastrointestinal: normal bowel sounds, soft, non-tender, no organomegaly
Extremities: normal inspection, normal capillary refill
Current Medications:
 Current Medications
 
 
  Sig/Enrique Start time  Last
 
Medication Dose Route Stop Time Status Admin
 
Acetaminophen 650 MG Q8P PRN  2100 AC 
 
  PO   1635
 
Albuterol Sulfate 3 ML BID  2100 AC 
 
  INH   1056
 
Alprazolam 0.5 MG BID  0945 AC 
 
  PO  0944  1035
 
Aspirin 81 MG DAILY  0900 AC 
 
  PO   0917
 
Atorvastatin Calcium 40 MG 1700  1700 AC 
 
  PO   1635
 
Budesonide/ 2 PUF BID  1030 AC 
 
Formoterol Fumarate  INH   0917
 
Ezetimibe 10 MG DAILY  0900 AC 
 
  PO   0916
 
Furosemide 20 MG DAILY  0900 DC 
 
  PO   
 
Heparin Sodium  5,000 UNIT Q8  2200 AC 
 
(Porcine)  SC   0632
 
Insulin Aspart 0 TIDAC  1700 AC 
 
  SC   1636
 
Levothyroxine Sodium 0.05 MG 0700 07/10 0700 AC 
 
  PO   0632
 
Metoprolol Succinate 50 MG DAILY  0900 AC 
 
  PO   0916
 
Mirtazapine 15 MG AT BEDTIME  0015 AC 
 
  PO   2153
 
Omeprazole 40 MG DAILY AC 07/10 0700 AC 
 
  PO   0632
 
Prednisone 20 MG DAILY  0900 AC 
 
  PO   0917
 
Ranolazine 1,000 MG BID  0002 AC 
 
  PO   0916
 
Senna/Docusate Sodium 1 TAB AT BEDTIME AS NEED..  1133 AC 
 
  PO   1208
 
Sodium Bicarbonate 1,300 MG BID  1400 AC 
 
  PO   0916
 
 
 
 
Impression/Plan
 
Impression/Problem List
Impression:
69 y/o F with a PMH of CAD, HTN, HLD, Squamous lung cell Ca, CKD, and paroxysmal
afib not on anticoagulation due to previous GI bleed that presented to the ED c/
o progressive dyspnea after not achieving relief using her rescue inhaler. She 
was admitted to the telemetry unit with an admitting diagnosis of COPD 
exacerbation and subsequently transfered to the ICU for closer monitoring due to
her desaturation on .  
 
ASSESSMENT AND PLAN
#End stage COPD/COPD Exacerbation
#Chronic anemia due to CKD
#Paroxysmal atrial fibrillation
#HTN
#CKD stage III 2/2 hypertensive nephrosclerosis
#Anxiety
#Squamous cell lung ca
 
End-stage COPD / COPD exacerbation/ Hypoxemic respiratory failure
Pt with a chest CT () showing diffuse pulmonary emphysema with chronic 
interstitial pneumonitis. Prior admit show similar complains of SOB - her 
symptoms are most likely related to her severe emphysematous picture that is 
worsened by her heightened state of anxiety that induces hyperventilation as per
pt. Pt is off antibiotics, leukocytosis is most likely related to her steroid 
use; WBC stabilizing and slowly on the downtrend.  Steroids have been switched 
to PO. F/u CXR reported no pneumonia findings. Currently patient is not in 
respiratory distress at rest buton significant distress on exertion. with 98% O2
sat on high flow 50% O2.
-TRC/Continue neb treatments
-Symbicort 2 puffs q12
-Prednisone 20mg PO
 
 
Paroxysmal A-fib / HTN
Pt currently off anticoagulation due to episodes of GI bleeding and chronic 
anemia due to CKD. Her BP has been controlled on metoprolol, cardiology is 
following. pt c/o leg pain/cramping, though no erythema is noted or pain is 
ellicited on palpation. B/L showed no evidence of DVT.
-Off anticoag 
-Metoprolol 50 mg qDaily
-Continue ASA/Statin/Ezetimibe
 
Chronic Anemia due to CKD, s/p 1U PRBC
Pt with Hb 8.8, baseline around 8-10. Most likely related to CKD rather than 
massive acute blood loss. She is on EPO at home, 1 dose was given during her 
admission. Next dose of EPO as per regular schedule should pt remains admitted. 
-f/u CBC
 
CKD stage III 2/2 hypertensive nephrosclerosis
Pt being followed by [facility], currently with K at 5.8, with a baseline Cr 1.5-2.2
and increasing BUN.
-Sodium Bicarb 1300 mg BID
-Kayexalate PRN if K>5.7
-Continue trending
-f/u nephros recommendation
 
Anxiety
Pt described herself as a very anxious person, especially with regards to her 
overall health.  She is currently on Alprazolam 0.5 mg at home. As per the pt, 
being in the ICU has increased her anxiety and feels discomfort as a result. 
Home dose of alprazolam restarted.
-Alprazolam 0.5 mg BID
 
Squamous cell lung CA 
S/p post gamma knife radiation, followed by Dr. Corcoran - latest visit received a
referral to an oncologist to evaluate a new left-sided lung mass that was 
positive for malignancy on biopsy.
 
 
DNR
DVT PPX: ALPS, sq heparin
Regular Diet
Problem List:
 1. COPD (chronic obstructive pulmonary disease)
 
 2. Anemia
 
Pain Ratin
Tomorrow's Labs & Rationales:
cbc
icu lab bundle
 
Plan
DVT/Prophylaxis: mechanical, pharmacological
 
 
Nilo NEWBERRY,Raghav 18 1008:
Attending MD Review Statement
 
Attending Sign Off
Attending Cosign Statement:
I have: examined this patient, reviewed [employer] EMR data, personally reviewd 
images, discussd w/resident/PA/NP, discussed mgmt plan w/brijesh, discussed mgmt 
plan w/CM, discussed mgmt plan w/pt, agreed w/resident/PA/NP, amended to note. 
Other Findings:
Impression
68 year old woman
 
ICU stay for hypoxemic acute on chronic respiratory failure
leukocytosis
squamous cell lung ca recently dx
anemia
ckd
 
Plan
-continue po prednisone
-monitor wbc - trending down
-remains on high flow oxygen, significant exertional dyspnea, 50% fio2
-off abx, can be reactive to steroids, will monitor off abx at this time, no 
evidence of pna at this time
-nephrology follow up
-bid xanax
-trc/nebs
 
DVT prophylaxis at all times
 
DNR - NOT DNI
 
TTS 35 min
 
Awaiting transfer to [facility] for pulmonary rehab

## 2018-07-19 NOTE — PN- CARDIOLOGY
Subjective
Subjective:
Patient remains comfortable.  No cardiac issues overnight.  Remains in sinus 
rhythm.
 
Objective
Vital Signs and I&Os
Vital Signs
 
 
Date Time Temp Pulse Resp B/P B/P Pulse O2 O2 Flow FiO2
 
     Mean Ox Delivery Rate 
 
07/19 0800      99 Nasal 50% 
 
       Cannula  
 
07/19 0800 98.6 66 24 124/52  99 Nasal 50% 
 
       Cannula  
 
07/19 0400      99 Nasal 50% 
 
       Cannula  
 
07/19 0058      100 Nasal 50% 
 
       Cannula  
 
07/19 0000      99 Nasal 50% 
 
       Cannula  
 
07/18 2300 97.0 81 14 110/48  99 Nasal 50% 
 
       Cannula  
 
07/18 2152  84 28 110/60     
 
07/18 2110      98 Nasal 50% 
 
       Cannula  
 
07/18 2000      97 Nasal 50% 
 
       Cannula  
 
07/18 1600      98 Nasal 50% 
 
       Cannula  
 
07/18 1600 97.8 81 18 104/58  98 Nasal 50% 
 
       Cannula  
 
07/18 1200      99 Nasal 50% 
 
       Cannula  
 
07/18 1200 97.3 73 21 90/50  99 Nasal 50% 
 
       Cannula  
 
07/18 0956  72  130/50     
 
07/18 0955  72  130/50     
 
 
 Intake & Output
 
 
 07/19 1600 07/19 0800 07/19 0000 07/18 1600 07/18 0800 07/18 0000
 
Intake Total  120 120 600 60 120
 
Output Total  800 400 500 800 400
 
Balance  -680 -280 100 -740 -280
 
       
 
Intake, Oral  120 120 600 60 120
 
Number  0 2 0 0 1
 
Bowel      
 
Movements      
 
Output, Urine  800 400 500 800 400
 
Patient  134 lb    
 
Weight      
 
Weight  Bed scale    
 
Measurement      
 
Method      
 
 
 
Physical Exam:
Physical exam patient appeared comfortable.
Head normocephalic atraumatic
Eyes sclera anicteric conjunctiva showed no pallor extraocular muscles were 
normal
Neck no jugular vein distention no thyroid masses no palpable nodes
Chest bilateral fine crackles
Heart regular rhythm with a grade 1-2 6 systolic murmur
Abdomen soft no organomegaly
Extremities no clubbing cyanosis or edema.
Neurological no gross motor or sensory deficits
Current Medications:
 Current Medications
 
 
  Sig/Enrique Start time  Last
 
Medication Dose Route Stop Time Status Admin
 
Acetaminophen 650 MG Q8P PRN 07/08 2100 AC 07/16
 
  PO   1635
 
Albuterol Sulfate 3 ML BID 07/09 2100 AC 07/18
 
  INH   2106
 
Alprazolam 0.5 MG BID 07/16 0945 AC 07/18
 
  PO 07/23 0944  2152
 
Aspirin 81 MG DAILY 07/09 0900 AC 07/18
 
  PO   0956
 
Atorvastatin Calcium 40 MG 1700 07/09 1700 AC 07/18
 
  PO   1635
 
Budesonide/ 2 PUF BID 07/09 1030 AC 07/18
 
Formoterol Fumarate  INH   2154
 
Ezetimibe 10 MG DAILY 07/09 0900 AC 07/18
 
  PO   0955
 
Furosemide 20 MG DAILY 07/18 0900 DC 
 
  PO   
 
Heparin Sodium  5,000 UNIT Q8 07/08 2200 AC 07/19
 
(Porcine)  SC   0632
 
Insulin Aspart 0 TIDAC 07/12 1700 AC 07/18
 
  SC   1636
 
Levothyroxine Sodium 0.05 MG 0700 07/10 0700 AC 07/19
 
  PO   0632
 
Metoprolol Succinate 50 MG DAILY 07/09 0900 AC 07/18
 
  PO   0956
 
Mirtazapine 15 MG AT BEDTIME 07/09 0015 AC 07/18
 
  PO   2153
 
Omeprazole 40 MG DAILY AC 07/10 0700 AC 07/19
 
  PO   0632
 
Prednisone 20 MG DAILY 07/17 0900 AC 07/18
 
  PO   0955
 
Ranolazine 1,000 MG BID 07/09 0002 AC 07/18
 
  PO   2152
 
Senna/Docusate Sodium 1 TAB AT BEDTIME AS NEED.. 07/12 1133 AC 07/17
 
  PO   1208
 
Sodium Bicarbonate 1,300 MG BID 07/17 1400 AC 07/18
 
  PO   2152
 
 
 
 
Results
Last 48 Hrs of Labs/Mics:
 Laboratory Tests
 
07/19/18 0438:
Anion Gap 12, Estimated GFR 28  L, Glucose 111  H, Calcium 8.7, Phosphorus 4.2, 
Magnesium 2.0, Total Bilirubin 0.6, AST 21, ALT 32, Albumin 3.0  L, CBC w Diff 
MAN DIFF ORDERED, RBC 3.34  L, MCV 81.4, MCH 25.4  L, MCHC 31.1  L, RDW 21.1  H,
MPV 9.7, Segmented Neutrophils 92  H, Band Neutrophils 2, Lymphocytes 5  L, 
Monocytes 1  L, Platelet Estimate ADEQUATE, Hypochromic-Microcytic 2+, 
Anisocytosis 1+, Schistocytes 1+
 
07/18/18 0630:
Anion Gap 11, Estimated GFR 25  L, Glucose 146  H, Calcium 8.5, Phosphorus 4.3, 
Magnesium 2.1, Total Bilirubin 0.5, AST 27, ALT 24, Albumin 2.9  L, CBC w Diff 
MAN DIFF ORDERED, RBC 3.52  L, MCV 81.0, MCH 24.9  L, MCHC 30.7  L, RDW 21.3  H,
MPV 9.5, Segmented Neutrophils 94  H, Band Neutrophils 1, Lymphocytes 2  L, 
Monocytes 2, Metamyelocytes 1, Platelet Estimate VERIFIED BY SMEAR, 
Polychromasia 1+, Poikilocytosis 1+, Anisocytosis 1+, Ovalocytes 1+
 
 
Assessment/Plan
Assessment/Plan
In summary this 68-year-old female has the following problems68-year-old woman 
with a past medical history of coronary artery disease (PCI in 2001 in 2014, as 
well as drug-eluting stents to the RCA in 2016) COPD, social lung disease, lung 
CA status post gamma knife radiation, paroxysmal atrial fibrillation (not on 
anticoagulation secondary to GI bleeds and anemia), chronic kidney disease and 
hypertension.  She presents to our hospital with symptoms of increasing severe 
dyspnea and weakness, which has been refractory to her rescue asthma inhalers.  
She has well has a noted approximate 10 pound weight gain over the past several 
weeks.
 
Dyspnea:
The patient presented with dyspnea which was most consistent with a combined 
underlying COPD exacerbation and less likely increasing fluid overload.  
Following diuresis, she currently appears euvolemic and I would attempt to 
maintain the same.  I suspect his dyspnea is mainly related to COPD, 
interstitial lung disease, lung cancer.
A recent echocardiogram performed demonstrated preserved LV systolic function, 
and a repeat of the same is currently not necessary.  
We will continue treatment aggressively as per pulmonary.
 
Anemia:
The patient has been receiving Procrit for her chronic anemia.  She feels 
overall improved following a transfusion
 
Atrial fibrillation:
Patient has known paroxysmal atrial fibrillation and is not anticoagulated 
secondary to a history of GI bleed.  Amiodarone was recently stopped.  Continued
to maintain beta-blockers.
Continue telemetry? Yes

## 2018-07-19 NOTE — PN- NEPHROLOGY
Assessment/Plan Nephrology
Assessment:
Stage III CKD - 2/2 hypertensive nephrosclerosis - baseline SCr is 1.5-2.2.  The
fact that she was just above the upper end of her normal may be 2/2 relative 
intravascular volume depletion (was on QOD lasix as an outpatient and had gotten
daily here) - now her SCr has improved with holding her lasix.  Discussed with 
her that given her severe lung disease, there may need to be some tradeoff in 
terms of worsened renal function for improved pulmonary function.  I think it'd 
be reasonable to hold diuretics again today.
 
Hyperkalemia - Tendency in the past and currently likely 2/2 catabolic state.  
On supplemental bicarb.
 
Anemia - On outpatient epogen which she received on 7/9 and gets q3 weeks - 
target Hg 10-11.
Suggestion:
-Restart every other day PO lasix 20mg starting tomorrow
-PRN kayexalate for K>5.7
-Cont sodium bicarb 1300mg BID
-Will need next dose of Epogen when due if remains in-house (gets q3 weeks)
 
Please call  with ?'s
 
 
Subjective
Subjective:
SCr 1.8
Lasix being held
Breathing OK
 
Objective
Vital Signs and I&Os
Vital Signs
 
 
Date Time Temp Pulse Resp B/P B/P Pulse O2 O2 Flow FiO2
 
     Mean Ox Delivery Rate 
 
07/19 0916  66  124/52     
 
07/19 0916  66  124/52     
 
07/19 0800      99 Nasal 50% 
 
       Cannula  
 
07/19 0800 98.6 66 24 124/52  99 Nasal 50% 
 
       Cannula  
 
07/19 0400      99 Nasal 50% 
 
       Cannula  
 
07/19 0058      100 Nasal 50% 
 
       Cannula  
 
07/19 0000      99 Nasal 50% 
 
       Cannula  
 
07/18 2300 97.0 81 14 110/48  99 Nasal 50% 
 
       Cannula  
 
07/18 2152  84 28 110/60     
 
07/18 2110      98 Nasal 50% 
 
       Cannula  
 
07/18 2000      97 Nasal 50% 
 
       Cannula  
 
07/18 1600      98 Nasal 50% 
 
       Cannula  
 
07/18 1600 97.8 81 18 104/58  98 Nasal 50% 
 
       Cannula  
 
 
 Intake & Output
 
 
 07/19 1600 07/19 0400 07/18 1600 07/18 0400 07/17 1600 07/17 0400
 
Intake Total 120 120 660 120 760 240
 
Output Total  400 1100 350
 
Balance -680 -280 -640 -280 -340 -110
 
       
 
Intake, Oral 120 120 660 120 760 240
 
Number 0 2 0 1  0
 
Bowel      
 
Movements      
 
Output, Urine  400 1100 350
 
Patient 134 lb    133 lb 
 
Weight      
 
Weight Bed scale    Bed scale 
 
Measurement      
 
Method      
 
 
 
Physical Exam:
Gen - ok appearing
HEENT - supple
CV - RRR, no m/r/g
Chest - clear, no w/r/r
Abd - soft, NTND
Ext - warm, no edema
Neuro - AOX3, grossly nonfocal
Current Medications:
 Current Medications
 
 
  Sig/Enrique Start time  Last
 
Medication Dose Route Stop Time Status Admin
 
Acetaminophen 650 MG Q8P PRN 07/08 2100 AC 07/16
 
  PO   1635
 
Albuterol Sulfate 3 ML BID 07/09 2100 AC 07/19
 
  INH   1056
 
Alprazolam 0.5 MG BID 07/16 0945 AC 07/19
 
  PO 07/23 0944  1035
 
Aspirin 81 MG DAILY 07/09 0900 AC 07/19
 
  PO   0917
 
Atorvastatin Calcium 40 MG 1700 07/09 1700 AC 07/18
 
  PO   1635
 
Budesonide/ 2 PUF BID 07/09 1030 AC 07/19
 
Formoterol Fumarate  INH   0917
 
Ezetimibe 10 MG DAILY 07/09 0900 AC 07/19
 
  PO   0916
 
Furosemide 20 MG DAILY 07/18 0900 DC 
 
  PO   
 
Heparin Sodium  5,000 UNIT Q8 07/08 2200 AC 07/19
 
(Porcine)  SC   0632
 
Insulin Aspart 0 TIDAC 07/12 1700 AC 07/18
 
  SC   1636
 
Levothyroxine Sodium 0.05 MG 0700 07/10 0700 AC 07/19
 
  PO   0632
 
Metoprolol Succinate 50 MG DAILY 07/09 0900 AC 07/19
 
  PO   0916
 
Mirtazapine 15 MG AT BEDTIME 07/09 0015 AC 07/18
 
  PO   2153
 
Omeprazole 40 MG DAILY AC 07/10 0700 AC 07/19
 
  PO   0632
 
Prednisone 20 MG DAILY 07/17 0900 AC 07/19
 
  PO   0917
 
Ranolazine 1,000 MG BID 07/09 0002 AC 07/19
 
  PO   0916
 
Senna/Docusate Sodium 1 TAB AT BEDTIME AS NEED.. 07/12 1133 AC 07/17
 
  PO   1208
 
Sodium Bicarbonate 1,300 MG BID 07/17 1400 AC 07/19
 
  PO   0916
 
 
 
 
Results
Pertinent Lab Results:
 Laboratory Tests
 
 
 07/19 07/18
 
 0438 0630
 
Chemistry  
 
  Sodium (137 - 145 mmol/L) 141 141
 
  Potassium (3.5 - 5.1 mmol/L) 5.1 5.3  H
 
  Chloride (98 - 107 mmol/L) 108  H 110  H
 
  Carbon Dioxide (22 - 30 mmol/L) 21  L 19  L
 
  Anion Gap (5 - 16) 12 11
 
  BUN (7 - 17 mg/dL) 55  H 63  H
 
  Creatinine (0.5 - 1.0 mg/dL) 1.8  H 2.0  H
 
  Estimated GFR (>60 ml/min) 28  L 25  L
 
  Glucose (65 - 99 mg/dL) 111  H 146  H
 
  Calcium (8.4 - 10.2 mg/dL) 8.7 8.5
 
  Phosphorus (2.5 - 4.5 mg/dL) 4.2 4.3
 
  Magnesium (1.6 - 2.3 mg/dL) 2.0 2.1
 
  Total Bilirubin (0.2 - 1.3 mg/dL) 0.6 0.5
 
  AST (14 - 36 U/L) 21 27
 
  ALT (9 - 52 U/L) 32 24
 
  Albumin (3.5 - 5.0 g/dL) 3.0  L 2.9  L
 
Hematology  
 
  CBC w Diff MAN DIFF ORDERED MAN DIFF ORDERED
 
  WBC (4.8 - 10.8 /CUMM) 22.2  H 25.7  H
 
  RBC (4.20 - 5.40 /CUMM) 3.34  L 3.52  L
 
  Hgb (12.0 - 16.0 G/DL) 8.5  L 8.7  L
 
  Hct (37 - 47 %) 27.2  L 28.5  L
 
  MCV (81.0 - 99.0 FL) 81.4 81.0
 
  MCH (27.0 - 31.0 PG) 25.4  L 24.9  L
 
  MCHC (33.0 - 37.0 G/DL) 31.1  L 30.7  L
 
  RDW (11.5 - 14.5 %) 21.1  H 21.3  H
 
  Plt Count (130 - 400 /CUMM) 306 354
 
  MPV (7.4 - 10.4 FL) 9.7 9.5
 
  Segmented Neutrophils (42.2 - 75.2 %) 92  H 94  H
 
  Band Neutrophils (0.0 - 5.0 %) 2 1
 
  Lymphocytes (20.5 - 51.1 %) 5  L 2  L
 
  Monocytes (1.7 - 9.3 %) 1  L 2
 
  Metamyelocytes (0.0 - 1.0 %)  1
 
  Platelet Estimate (ADEQUATE) ADEQUATE VERIFIED BY SMEAR
 
  Polychromasia  1+
 
  Hypochromic-Microcytic 2+ 
 
  Poikilocytosis  1+
 
  Anisocytosis 1+ 1+
 
  Ovalocytes  1+
 
  Schistocytes 1+ 
 
 
 
 
 07/17
 
 0415
 
Chemistry 
 
  Sodium (137 - 145 mmol/L) 139
 
  Potassium (3.5 - 5.1 mmol/L) 5.2  H
 
  Chloride (98 - 107 mmol/L) 106
 
  Carbon Dioxide (22 - 30 mmol/L) 22
 
  Anion Gap (5 - 16) 11
 
  BUN (7 - 17 mg/dL) 68  H
 
  Creatinine (0.5 - 1.0 mg/dL) 2.3  H
 
  Estimated GFR (>60 ml/min) 21  L
 
  Glucose (65 - 99 mg/dL) 209  H
 
  Calcium (8.4 - 10.2 mg/dL) 8.6
 
  Phosphorus (2.5 - 4.5 mg/dL) 4.4
 
  Magnesium (1.6 - 2.3 mg/dL) 2.2
 
  Total Bilirubin (0.2 - 1.3 mg/dL) 0.5
 
  AST (14 - 36 U/L) 22
 
  ALT (9 - 52 U/L) 28
 
  Albumin (3.5 - 5.0 g/dL) 3.2  L
 
Hematology 
 
  CBC w Diff MAN DIFF ORDERED
 
  WBC (4.8 - 10.8 /CUMM) 27.0  H
 
  RBC (4.20 - 5.40 /CUMM) 3.63  L
 
  Hgb (12.0 - 16.0 G/DL) 9.2  L
 
  Hct (37 - 47 %) 29.5  L
 
  MCV (81.0 - 99.0 FL) 81.4
 
  MCH (27.0 - 31.0 PG) 25.2  L
 
  MCHC (33.0 - 37.0 G/DL) 31.0  L
 
  RDW (11.5 - 14.5 %) 20.9  H
 
  Plt Count (130 - 400 /CUMM) 355
 
  MPV (7.4 - 10.4 FL) 9.4
 
  Segmented Neutrophils (42.2 - 75.2 %) 86  H
 
  Band Neutrophils (0.0 - 5.0 %) 2
 
  Lymphocytes (20.5 - 51.1 %) 5  L
 
  Monocytes (1.7 - 9.3 %) 7
 
  Nucleated RBCs (0.0 - 0.0 /100WBC) 3  H
 
  Platelet Estimate (ADEQUATE) ADEQUATE
 
  Polychromasia 1+
 
 
 
Imaging/Other Studies:
None new

## 2018-07-20 VITALS — DIASTOLIC BLOOD PRESSURE: 70 MMHG | SYSTOLIC BLOOD PRESSURE: 120 MMHG

## 2018-07-20 LAB
ERYTHROCYTE [DISTWIDTH] IN BLOOD BY AUTOMATED COUNT: 21.1 % (ref 11.5–14.5)
HCT VFR BLD CALC: 25.5 % (ref 37–47)
MCH RBC QN AUTO: 25.3 PG (ref 27–31)
MCHC RBC AUTO-ENTMCNC: 31.2 G/DL (ref 33–37)
MCV RBC AUTO: 81.1 FL (ref 81–99)
PLATELET # BLD: 304 /CUMM (ref 130–400)
PMV BLD AUTO: 9.6 FL (ref 7.4–10.4)
RED BLOOD CELL CT: 3.15 /CUMM (ref 4.2–5.4)
WBC # BLD AUTO: 21.8 /CUMM (ref 4.8–10.8)

## 2018-07-20 NOTE — PN- CARDIOLOGY
Subjective
Subjective:
Patient resting comfortably.  Reports dyspnea is improved.  Denies chest pain, 
palpitations.
 
Objective
Vital Signs and I&Os
Vital Signs
 
 
Date Time Temp Pulse Resp B/P B/P Pulse O2 O2 Flow FiO2
 
     Mean Ox Delivery Rate 
 
07/20 1224 97.9 70 20 120/70     
 
07/20 1025      97 Nasal 50% 
 
       Cannula  
 
07/20 0835  70  120/70     
 
07/20 0835  70  120/70     
 
07/20 0800      96 Nasal 50% 
 
       Cannula  
 
07/20 0800 97.9 60 20 120/70  96 Nasal 50% 
 
       Cannula  
 
07/20 0634      100 Nasal 50% 
 
       Cannula  
 
07/20 0400      98 Nasal 50% 
 
       Cannula  
 
07/20 0248       Nasal 50% 
 
       Cannula  
 
07/20 0008      98 Nasal 50% 
 
       Cannula  
 
07/20 0000      98 Nasal 50% 
 
       Cannula  
 
07/19 2300 96.3 77 17 120/48  98 Nasal 50% 
 
       Cannula  
 
07/19 2206  80 24 112/60     
 
07/19 2046      97 Nasal 50% 
 
       Cannula  
 
07/19 2000      96 Nasal 50% 
 
       Cannula  
 
07/19 1600      98 Nasal 50% 
 
       Cannula  
 
07/19 1600 97.9 78 20 124/60  98 Nasal 50% 
 
       Cannula  
 
 
 Intake & Output
 
 
 07/20 1600 07/20 0800 07/20 0000 07/19 1600 07/19 0800 07/19 0000
 
Intake Total  120 360 720 120 120
 
Output Total  900 850 500 800 400
 
Balance  -780 -490 220 -680 -280
 
       
 
Intake, IV    0  
 
Intake, Oral  120 360 720 120 120
 
Number  0 1 1 0 2
 
Bowel      
 
Movements      
 
Output, Urine  900 850 500 800 400
 
Patient     60.781 kg 
 
Weight      
 
Weight     Bed scale 
 
Measurement      
 
Method      
 
 
 
Physical Exam:
General: no apparent distress, on high flow nasal cannula
HEENT: NCAT, NO JVD
Heart: s1s2, RRR, no MRG
Lungs: bibasilar dry crackles
Abd: soft, nt
Ext: no peripheral edema
Current Medications:
 Current Medications
 
 
  Sig/Enrique Start time  Last
 
Medication Dose Route Stop Time Status Admin
 
Acetaminophen 650 MG Q8P PRN 07/08 2100 AC 07/16
 
  PO   1635
 
Albuterol Sulfate 3 ML BID 07/09 2100 AC 07/20
 
  INH   1022
 
Alprazolam 0.5 MG BID 07/16 0945 AC 07/20
 
  PO 07/23 0944  1015
 
Aspirin 81 MG DAILY 07/09 0900 AC 07/20
 
  PO   0835
 
Atorvastatin Calcium 40 MG 1700 07/09 1700 AC 07/19
 
  PO   1632
 
Budesonide/ 2 PUF BID 07/09 1030 AC 07/20
 
Formoterol Fumarate  INH   0838
 
Ezetimibe 10 MG DAILY 07/09 0900 AC 07/20
 
  PO   0835
 
Furosemide 20 MG Q48 07/20 0915 AC 
 
  PO   
 
Heparin Sodium  5,000 UNIT Q8 07/08 2200 AC 07/20
 
(Porcine)  SC   0536
 
Insulin Aspart 0 TIDAC 07/12 1700 AC 07/19
 
  SC   1632
 
Levothyroxine Sodium 0.05 MG 0700 07/10 0700 AC 07/20
 
  PO   0533
 
Metoprolol Succinate 50 MG DAILY 07/09 0900 AC 07/20
 
  PO   0835
 
Mirtazapine 15 MG AT BEDTIME 07/09 0015 AC 07/19
 
  PO   2205
 
Omeprazole 40 MG DAILY AC 07/10 0700 AC 07/20
 
  PO   0534
 
Prednisone 20 MG DAILY 07/17 0900 AC 07/20
 
  PO   0835
 
Ranolazine 1,000 MG BID 07/09 0002 AC 07/20
 
  PO   0835
 
Senna/Docusate Sodium 1 TAB AT BEDTIME AS NEED.. 07/12 1133 AC 07/17
 
  PO   1208
 
Sodium Bicarbonate 1,300 MG BID 07/17 1400 AC 07/20
 
  PO   0835
 
 
 
 
Results
Last 48 Hrs of Labs/Mics:
 Laboratory Tests
 
07/20/18 0528:
Anion Gap 11, Estimated GFR 26  L, Glucose 109  H, Calcium 8.9, Phosphorus 4.2, 
Magnesium 1.9, Total Bilirubin 0.6, AST 37  H, ALT 35, Albumin 3.3  L, CBC w 
Diff MAN DIFF ORDERED, RBC 3.15  L, MCV 81.1, MCH 25.3  L, MCHC 31.2  L, RDW 
21.1  H, MPV 9.6, Segmented Neutrophils 86  H, Lymphocytes 9  L, Monocytes 4, 
Eosinophils 1, Nucleated RBCs 1  H, Platelet Estimate ADEQUATE, Polychromasia 1+
, Hypochromic-Microcytic 1+, Poikilocytosis 1+, Ovalocytes 1+, Elliptocytes FEW,
Fld Total RBCs Counted 100
 
07/19/18 0438:
Anion Gap 12, Estimated GFR 28  L, Glucose 111  H, Calcium 8.7, Phosphorus 4.2, 
Magnesium 2.0, Total Bilirubin 0.6, AST 21, ALT 32, Albumin 3.0  L, CBC w Diff 
MAN DIFF ORDERED, RBC 3.34  L, MCV 81.4, MCH 25.4  L, MCHC 31.1  L, RDW 21.1  H,
MPV 9.7, Segmented Neutrophils 92  H, Band Neutrophils 2, Lymphocytes 5  L, 
Monocytes 1  L, Platelet Estimate ADEQUATE, Hypochromic-Microcytic 2+, 
Anisocytosis 1+, Schistocytes 1+
 
Recent Imaging Studies:
telemetry personally reviewed: normal sinus rhythm
 
 
Assessment/Plan
Assessment/Plan
1.  Worsening dyspnea with associated hypoxemia-likely related to underlying 
pulmonary issues
2.  History of coronary artery disease, status post intervention in 2001 and 
2014 with drug-eluting stents to the right coronary in 2016
3.  History of lung cancer, status post gamma knife radiation
4.  History of paroxysmal atrial fibrillation, not on anticoagulant therapy due 
to GI bleeding and anemia
4.  Chronic renal insufficiency
5.  Hypertension
 
Patient currently appears euvolemic on exam.  Can continue on p.o. Lasix every 
48 hours.  Continue with metoprolol.  Please ensure the patient follows up in 
our office within 1 week.
Continue telemetry? No

## 2018-07-20 NOTE — PN- NEPHROLOGY
Assessment/Plan Nephrology
Assessment:
Stage III CKD - 2/2 hypertensive nephrosclerosis - baseline SCr is 1.5-2.2.  SCr
has proved to be very volume sensitive.  Discussed with her that given her 
severe lung disease, there may need to be some tradeoff in terms of worsened 
renal function for improved pulmonary function.  Current renal function and 
pulmonary status reasonable - pt leaving today.
 
Hyperkalemia - Tendency in the past and currently likely 2/2 catabolic state.  
On supplemental bicarb but is now refusing - will need to K restrict diet.
 
Anemia - On outpatient epogen which she received on 7/9 and gets q3 weeks - 
target Hg 10-11.
Suggestion:
-Restart every other day PO lasix 20mg (pt requesting to wait until she gets to 
the ECF today)
-Pt refusing sodium bicarb
-Needs K restricted diet
-Should have labs checked in next 1-2 weeks
-Cont Epogen as outpatient (q3 week schedule)
 
Please call  with ?'s
 
 
Subjective
Subjective:
SCr 1.9
Does not want to take lasix before she leaves and does not want to take sodium 
bicarb (has refused it and baking soda mixed with water in the past)
Breathing OK
 
Objective
Vital Signs and I&Os
Vital Signs
 
 
Date Time Temp Pulse Resp B/P B/P Pulse O2 O2 Flow FiO2
 
     Mean Ox Delivery Rate 
 
07/20 1224 97.9 70 20 120/70     
 
07/20 1025      97 Nasal 50% 
 
       Cannula  
 
07/20 0835  70  120/70     
 
07/20 0835  70  120/70     
 
07/20 0800      96 Nasal 50% 
 
       Cannula  
 
07/20 0800 97.9 60 20 120/70  96 Nasal 50% 
 
       Cannula  
 
07/20 0634      100 Nasal 50% 
 
       Cannula  
 
07/20 0400      98 Nasal 50% 
 
       Cannula  
 
07/20 0248       Nasal 50% 
 
       Cannula  
 
07/20 0008      98 Nasal 50% 
 
       Cannula  
 
07/20 0000      98 Nasal 50% 
 
       Cannula  
 
07/19 2300 96.3 77 17 120/48  98 Nasal 50% 
 
       Cannula  
 
07/19 2206  80 24 112/60     
 
07/19 2046      97 Nasal 50% 
 
       Cannula  
 
07/19 2000      96 Nasal 50% 
 
       Cannula  
 
07/19 1600      98 Nasal 50% 
 
       Cannula  
 
07/19 1600 97.9 78 20 124/60  98 Nasal 50% 
 
       Cannula  
 
 
 Intake & Output
 
 
 07/20 1600 07/20 0400 07/19 1600 07/19 0400 07/18 1600 07/18 0400
 
Intake Total 120 360 840 120 660 120
 
Output Total  400 1300 400
 
Balance -780 -490 -460 -280 -640 -280
 
       
 
Intake, IV   0   
 
Intake, Oral 120 360 840 120 660 120
 
Number 0 1 1 2 0 1
 
Bowel      
 
Movements      
 
Output, Urine  400 1300 400
 
Patient   134 lb   
 
Weight      
 
Weight   Bed scale   
 
Measurement      
 
Method      
 
 
 
Physical Exam:
Gen - ok appearing
HEENT - supple
CV - RRR, no m/r/g
Chest - clear, no w/r/r
Abd - soft, NTND
Ext - warm, no edema
Neuro - AOX3, grossly nonfocal
Current Medications:
 Current Medications
 
 
  Sig/Enrique Start time  Last
 
Medication Dose Route Stop Time Status Admin
 
Acetaminophen 650 MG Q8P PRN 07/08 2100 AC 07/16
 
  PO   1635
 
Albuterol Sulfate 3 ML BID 07/09 2100 AC 07/20
 
  INH   1022
 
Alprazolam 0.5 MG BID 07/16 0945 AC 07/20
 
  PO 07/23 0944  1015
 
Aspirin 81 MG DAILY 07/09 0900 AC 07/20
 
  PO   0835
 
Atorvastatin Calcium 40 MG 1700 07/09 1700 AC 07/19
 
  PO   1632
 
Budesonide/ 2 PUF BID 07/09 1030 AC 07/20
 
Formoterol Fumarate  INH   0838
 
Ezetimibe 10 MG DAILY 07/09 0900 AC 07/20
 
  PO   0835
 
Furosemide 20 MG Q48 07/20 0915 AC 
 
  PO   
 
Heparin Sodium  5,000 UNIT Q8 07/08 2200 AC 07/20
 
(Porcine)  SC   0536
 
Insulin Aspart 0 TIDAC 07/12 1700 AC 07/19
 
  SC   1632
 
Levothyroxine Sodium 0.05 MG 0700 07/10 0700 AC 07/20
 
  PO   0533
 
Metoprolol Succinate 50 MG DAILY 07/09 0900 AC 07/20
 
  PO   0835
 
Mirtazapine 15 MG AT BEDTIME 07/09 0015 AC 07/19
 
  PO   2205
 
Omeprazole 40 MG DAILY AC 07/10 0700 AC 07/20
 
  PO   0534
 
Prednisone 20 MG DAILY 07/17 0900 AC 07/20
 
  PO   0835
 
Ranolazine 1,000 MG BID 07/09 0002 AC 07/20
 
  PO   0835
 
Senna/Docusate Sodium 1 TAB AT BEDTIME AS NEED.. 07/12 1133 AC 07/17
 
  PO   1208
 
Sodium Bicarbonate 1,300 MG BID 07/17 1400 AC 07/20
 
  PO   0835
 
 
 
 
Results
Pertinent Lab Results:
 Laboratory Tests
 
 
 07/20 07/19
 
 [id number]
 
Chemistry  
 
  Sodium (137 - 145 mmol/L) 142 141
 
  Potassium (3.5 - 5.1 mmol/L) 5.2  H 5.1
 
  Chloride (98 - 107 mmol/L) 109  H 108  H
 
  Carbon Dioxide (22 - 30 mmol/L) 23 21  L
 
  Anion Gap (5 - 16) 11 12
 
  BUN (7 - 17 mg/dL) 58  H 55  H
 
  Creatinine (0.5 - 1.0 mg/dL) 1.9  H 1.8  H
 
  Estimated GFR (>60 ml/min) 26  L 28  L
 
  Glucose (65 - 99 mg/dL) 109  H 111  H
 
  Calcium (8.4 - 10.2 mg/dL) 8.9 8.7
 
  Phosphorus (2.5 - 4.5 mg/dL) 4.2 4.2
 
  Magnesium (1.6 - 2.3 mg/dL) 1.9 2.0
 
  Total Bilirubin (0.2 - 1.3 mg/dL) 0.6 0.6
 
  AST (14 - 36 U/L) 37  H 21
 
  ALT (9 - 52 U/L) 35 32
 
  Albumin (3.5 - 5.0 g/dL) 3.3  L 3.0  L
 
Hematology  
 
  CBC w Diff MAN DIFF ORDERED MAN DIFF ORDERED
 
  WBC (4.8 - 10.8 /CUMM) 21.8  H 22.2  H
 
  RBC (4.20 - 5.40 /CUMM) 3.15  L 3.34  L
 
  Hgb (12.0 - 16.0 G/DL) 8.0  L 8.5  L
 
  Hct (37 - 47 %) 25.5  L 27.2  L
 
  MCV (81.0 - 99.0 FL) 81.1 81.4
 
  MCH (27.0 - 31.0 PG) 25.3  L 25.4  L
 
  MCHC (33.0 - 37.0 G/DL) 31.2  L 31.1  L
 
  RDW (11.5 - 14.5 %) 21.1  H 21.1  H
 
  Plt Count (130 - 400 /CUMM) 304 306
 
  MPV (7.4 - 10.4 FL) 9.6 9.7
 
  Segmented Neutrophils (42.2 - 75.2 %) 86  H 92  H
 
  Band Neutrophils (0.0 - 5.0 %)  2
 
  Lymphocytes (20.5 - 51.1 %) 9  L 5  L
 
  Monocytes (1.7 - 9.3 %) 4 1  L
 
  Eosinophils (0 - 5.0 %) 1 
 
  Nucleated RBCs (0.0 - 0.0 /100WBC) 1  H 
 
  Platelet Estimate (ADEQUATE) ADEQUATE ADEQUATE
 
  Polychromasia 1+ 
 
  Hypochromic-Microcytic 1+ 2+
 
  Poikilocytosis 1+ 
 
  Anisocytosis  1+
 
  Ovalocytes 1+ 
 
  Elliptocytes FEW 
 
  Schistocytes  1+
 
Other Body Source  
 
  Fld Total RBCs Counted (%) 100 
 
 
 
 
 07/18
 
 0630
 
Chemistry 
 
  Sodium (137 - 145 mmol/L) 141
 
  Potassium (3.5 - 5.1 mmol/L) 5.3  H
 
  Chloride (98 - 107 mmol/L) 110  H
 
  Carbon Dioxide (22 - 30 mmol/L) 19  L
 
  Anion Gap (5 - 16) 11
 
  BUN (7 - 17 mg/dL) 63  H
 
  Creatinine (0.5 - 1.0 mg/dL) 2.0  H
 
  Estimated GFR (>60 ml/min) 25  L
 
  Glucose (65 - 99 mg/dL) 146  H
 
  Calcium (8.4 - 10.2 mg/dL) 8.5
 
  Phosphorus (2.5 - 4.5 mg/dL) 4.3
 
  Magnesium (1.6 - 2.3 mg/dL) 2.1
 
  Total Bilirubin (0.2 - 1.3 mg/dL) 0.5
 
  AST (14 - 36 U/L) 27
 
  ALT (9 - 52 U/L) 24
 
  Albumin (3.5 - 5.0 g/dL) 2.9  L
 
Hematology 
 
  CBC w Diff MAN DIFF ORDERED
 
  WBC (4.8 - 10.8 /CUMM) 25.7  H
 
  RBC (4.20 - 5.40 /CUMM) 3.52  L
 
  Hgb (12.0 - 16.0 G/DL) 8.7  L
 
  Hct (37 - 47 %) 28.5  L
 
  MCV (81.0 - 99.0 FL) 81.0
 
  MCH (27.0 - 31.0 PG) 24.9  L
 
  MCHC (33.0 - 37.0 G/DL) 30.7  L
 
  RDW (11.5 - 14.5 %) 21.3  H
 
  Plt Count (130 - 400 /CUMM) 354
 
  MPV (7.4 - 10.4 FL) 9.5
 
  Segmented Neutrophils (42.2 - 75.2 %) 94  H
 
  Band Neutrophils (0.0 - 5.0 %) 1
 
  Lymphocytes (20.5 - 51.1 %) 2  L
 
  Monocytes (1.7 - 9.3 %) 2
 
  Metamyelocytes (0.0 - 1.0 %) 1
 
  Platelet Estimate (ADEQUATE) VERIFIED BY SMEAR
 
  Polychromasia 1+
 
  Poikilocytosis 1+
 
  Anisocytosis 1+
 
  Ovalocytes 1+
 
 
 
Imaging/Other Studies:
None new

## 2018-07-20 NOTE — PN- RESIDENT CRCU
Bo Sainz 18 0728:
Subjective
HPI/CRCU Issues:
#End stage COPD/COPD Exacerbation
#Chronic anemia due to CKD
#Paroxysmal atrial fibrillation
#HTN
#CKD stage III 2/2 hypertensive nephrosclerosis
#Anxiety
#Squamous cell lung ca
24 Hour Events:
Pt seen and examined this am. She reports no acute complains. Still 
significantly distressed on exertion. 
 
Objective
 
Vital Signs & I&O
Last 8 Hrs of Vitals and I&O:
#End stage COPD/COPD Exacerbation
#Chronic anemia due to CKD
#Paroxysmal atrial fibrillation
#HTN
#CKD stage III 2/2 hypertensive nephrosclerosis
#Anxiety
#Squamous cell lung ca
 
Exam
General Appearance: no apparent distress, alert, awake, anxious
Head: atraumatic, normal appearance
Neck: normal inspection, supple, full range of motion
Respiratory: chest non-tender, significant distress on exertion
Cardiovascular: regular rate/rhythm, edema
Gastrointestinal: normal bowel sounds, soft, non-tender, no organomegaly
Extremities: normal inspection, normal capillary refill
Current Medications:
 Current Medications
 
 
  Sig/Enrique Start time  Last
 
Medication Dose Route Stop Time Status Admin
 
Acetaminophen 650 MG Q8P PRN  2100 AC 
 
  PO   1635
 
Albuterol Sulfate 3 ML BID  2100 AC 
 
  INH   1022
 
Alprazolam 0.5 MG BID  0945 AC 
 
  PO  0944  1015
 
Aspirin 81 MG DAILY  09 AC 
 
  PO   0835
 
Atorvastatin Calcium 40 MG 1700  1700 AC 
 
  PO   1632
 
Budesonide/ 2 PUF BID  1030 AC 
 
Formoterol Fumarate  INH   0838
 
Ezetimibe 10 MG DAILY  09 AC 
 
  PO   0835
 
Furosemide 20 MG Q48  0915 AC 
 
  PO   
 
Heparin Sodium  5,000 UNIT Q8  2200 AC 
 
(Porcine)  SC   0536
 
Insulin Aspart 0 TIDAC  1700 AC 
 
  SC   1632
 
Levothyroxine Sodium 0.05 MG 0700 07/10 0700 AC 
 
  PO   0533
 
Metoprolol Succinate 50 MG DAILY  0900 AC 
 
  PO   0835
 
Mirtazapine 15 MG AT BEDTIME  0015 AC 
 
  PO   2205
 
Omeprazole 40 MG DAILY AC 07/10 0700 AC 
 
  PO   0534
 
Prednisone 20 MG DAILY  0900 AC 
 
  PO   0835
 
Ranolazine 1,000 MG BID  0002 AC 
 
  PO   0835
 
Senna/Docusate Sodium 1 TAB AT BEDTIME AS NEED..  1133 AC 
 
  PO   1208
 
Sodium Bicarbonate 1,300 MG BID  1400 AC 
 
  PO   0835
 
 
 
 
Impression/Plan
 
Impression/Problem List
Impression:
69 y/o F with a PMH of CAD, HTN, HLD, Squamous lung cell Ca, CKD, and paroxysmal
afib not on anticoagulation due to previous GI bleed that presented to the ED c/
o progressive dyspnea after not achieving relief using her rescue inhaler. She 
was admitted to the telemetry unit with an admitting diagnosis of COPD 
exacerbation and subsequently transfered to the ICU for closer monitoring due to
her desaturation on .  
 
ASSESSMENT AND PLAN
#End stage COPD/COPD Exacerbation
#Chronic anemia due to CKD
#Paroxysmal atrial fibrillation
#HTN
#CKD stage III 2/2 hypertensive nephrosclerosis
#Anxiety
#Squamous cell lung ca
 
End-stage COPD / COPD exacerbation/ Hypoxemic respiratory failure
Pt with a chest CT () showing diffuse pulmonary emphysema with chronic 
interstitial pneumonitis. Prior admit show similar complains of SOB - her 
symptoms are most likely related to her severe emphysematous picture that is 
worsened by her heightened state of anxiety that induces hyperventilation as per
pt. Pt is off antibiotics, leukocytosis is most likely related to her steroid 
use; WBC stabilizing and slowly on the downtrend.  Steroids have been switched 
to PO. F/u CXR reported no pneumonia findings. Currently patient is not in 
respiratory distress at rest but on significant distress on exertion. Plan is to
transfer to a pulm rehab facility. 
-TRC/Continue neb treatments
-Symbicort 2 puffs q12
-Prednisone 20mg PO
 
 
Paroxysmal A-fib / HTN
Pt currently off anticoagulation due to episodes of GI bleeding and chronic 
anemia due to CKD. Her BP has been controlled on metoprolol, cardiology is 
following. pt c/o leg pain/cramping, though no erythema is noted or pain is 
ellicited on palpation. B/L showed no evidence of DVT.
-Off anticoag 
-Metoprolol 50 mg qDaily
-Continue ASA/Statin/Ezetimibe
 
Chronic Anemia due to CKD, s/p 1U PRBC
Pt with Hb 8.0, baseline around 8-10. Most likely related to CKD. She is on EPO 
at home, 1 dose was given during her admission. 
 
 
CKD stage III 2/2 hypertensive nephrosclerosis
Pt being followed by [facility]ph[facility], currently with K at 5.2, with a baseline Cr 1.5-
2.2. 
-Sodium Bicarb 1300 mg BID
-Kayexalate PRN if K>5.7
 
Anxiety
Pt described herself as a very anxious person, especially with regards to her 
overall health.  She is currently on Alprazolam 0.5 mg at home. Home dose of 
alprazolam restarted.
-Alprazolam 0.5 mg BID
 
Squamous cell lung CA 
S/p post gamma knife radiation, followed by Dr. Corcoran - latest visit received a
referral to an oncologist to evaluate a new left-sided lung mass that was 
positive for malignancy on biopsy.
 
 
DNR
DVT PPX: ALPS, sq heparin
Regular Diet
Problem List:
 1. COPD (chronic obstructive pulmonary disease)
 
 2. Chronic renal insufficiency
 
Pain Ratin
Tomorrow's Labs & Rationales:
none
 
Plan
DVT/Prophylaxis: mechanical, pharmacological
 
 
Raghav Corcoran MD 18 1231:
Attending MD Review Statement
 
Attending Sign Off
Attending Cosign Statement:
I have: examined this patient, reviewed [employer] EMR data, personally reviewd 
images, discussd w/resident/PA/NP, discussed mgmt plan w/brijesh, discussed mgmt 
plan w/CM, discussed mgmt plan w/pt, agreed w/resident/PA/NP, amended to note. 
Other Findings:
Impression
68 year old woman
 
ICU stay for hypoxemic acute on chronic respiratory failure
leukocytosis
squamous cell lung ca recently dx
anemia
ckd
 
Plan
-continue po prednisone
-monitor wbc - trending down
-remains on high flow oxygen, significant exertional dyspnea, 50% fio2
-off abx, can be reactive to steroids, will monitor off abx at this time, no 
evidence of pna at this time
-nephrology follow up
-bid xanax
-trc/nebs
 
DVT prophylaxis at all times
 
DNR - NOT DNI
 
TTS 35 min
 
Awaiting transfer to [place] for pulmonary rehab

## 2018-07-20 NOTE — DISCHARGE SUMMARY
Visit Information
 
Visit Dates
Admission Date:
07/08/18
 
Discharge Date:
07/20/18
 
 
Hospital Course
 
Course
Attending Physician:
Raghav Corcoran MD
 
Primary Care Physician:
Jaciel Mari MD
 
Hospital Course:
Ms De La Cruz is a 68 year old female with PMH of CAD, HTN, HLD, squamous lung 
cell cancer, CKD Stage IV, and paroxysmal atrial fibrillation not on 
anticoagulation due to previous GI bleed who presented with complaints of 
symptoms of increasing severe dyspnea and weakness, which had been refractory to
her rescue asthma inhalers. She was admitted to the telemetry unit with an 
admitting diagnosis of COPD exacerbation and subsequently transfered to the ICU 
for desaturation and hypoxia on 07/14. 
 
Below is a list of problems that were addressed during her hospital stay
 
Acute on chronic hypoxemic respiratory failure
 
Patient presented with complaints of worsening dyspnea. She was initially on 6L 
of O2 and subsequently transitioned to high flow oxygen to maintain her 
oxygenation. Also started on IV Solu-Medrol along with TRC/nebs and 5 day course
of Azithromycin. Her symptoms are likely secondary to her COPD. A V/Q scan was 
performed which ruled out a pulmonary embolism. Chest CT (07/08) showed severe 
pulmonary emphysema and chronic interstitial pneumonitis which could explain 
progressive respiratory failure as well. Her steroids were slowly weaned down 
and transitioned to oral prednisone. She has a persistent leukocytosis which is 
likely secondary to steroid use. The leukocytosis is noted to trend down, 
however, this needs to be followed up next week. Due to her respiratory status 
requiring persistent high flow oxygen she will benefit greatly from pulmonary 
rehabilitation acutely. She currently remains on 50% high flow oxygen. 
 
Squamous cell lung cancer:
 Left lung mass biopsy positive
 S/p post gamma knife radiation
 During the admission for rehabilitation she will not be able to initiate 
therapy for her malignancy and/or attend oncologic outpatient needs.
 
Hyperkalemia
 likely secondary to catabolic state
 Started on potassium restricted diet. 
 
CKD Stage III
 
 likely secondary to hypertensive nephrosclerosis. baseline SCr is 1.5-2.2
 on sodium bicarb 1300mg BID
 on lasix QOD
 
Paroxysmal A-fib/CAD/HTN:
 
 Off anticoagulation
 Continued on aspirin, statin, metoprolol, ranexa, and ezetimibe
 
Anxiety
 Continued on Alprazolam 0.5 mg PO BID + prn
 
Hypothyroidism:
 Continued on synthroid
 
Allergies:
Coded Allergies:
No Known Allergies (05/25/16)
 
Significant Procedures:
SERVICE DATE: 07/16/18-
EXAM TYPE: RAD - XRY-PORTABLE CHEST XRAY
 
FINDINGS:
EKG leads overlie the chest. The cardiomediastinal silhouette is within
normal limits in size.
 
Low lung volumes are seen with diffuse increased reticular lung markings and
slight thickening of the central airways, consistent with the previously
demonstrated obstructive lung disease and fibrosis. Mild bibasilar
subsegmental atelectasis is seen. No focal consolidation, significant
effusion or pneumothorax is seen.
 
Bony structures are grossly unremarkable.
 
IMPRESSION:
Findings consistent with mild bibasilar subsegmental atelectasis superimposed
upon chronic obstructive lung disease and fibrotic lung changes. No evolving
pneumonia seen.
 
SERVICE DATE: 07/14/18-
EXAM TYPE: RAD - XRY-PORTABLE CHEST XRAY
 
FINDINGS:
EKG leads are seen overlying the chest. The cardiomediastinal silhouette is
enlarged. Calcification of the aorta is seen.
 
Lungs are hyperinflated, consistent with patient's known history of COPD.
Superimposed reticular opacities are seen in the mid and lower lungs,
consistent with the CT demonstrated fibrotic changes, perhaps related to RB
ILD. The patient's known large lingular cancer is poorly appreciated on this
portable film in the medial left infrahilar region. No dense consolidation is
seen. There is some indistinctness of the CP angles, likely representing
atelectatic changes though trace pleural effusions could have a similar
appearance. No evidence of pulmonary edema is seen. No pneumothorax is seen.
 
Osteopenia is noted with mild convex right thoracolumbar scoliosis.
 
IMPRESSION:
 
1. Extensive obstructive lung disease with fibrotic changes in the mid and
lower lung bilaterally. No superimposed focal acute process is seen.
Specifically, no evidence of pulmonary edema is seen.
2. Slight indistinctness of the CP angles is noted, likely due to atelectatic
change, though trace pleural effusions cannot be completely excluded.
 
SERVICE DATE: 07/10/
EXAM TYPE: NUC - LUNG SCAN (V/Q)
 
FINDINGS:
Ventilation images: On the single breath and equilibrium images there is
homogeneous distribution of gas bilaterally. During the washout phase there
is no abnormal retention.
 
Perfusion images: No segmental perfusion defects are present. There is mild
heterogeneity present bilaterally but no focal or anatomic appearing
perfusion defects are present. There is prominence of the interlobar fissures
bilaterally.
 
The CT scan dated 07/09/2018 shows extensive chronic interstitial pneumonitis.
 
Compared to the prior lung scan dated 05/16/2016 there is more heterogeneity
on the current study and more prominence of the interlobar fissures. There
has also been significant progression of the interstitial lung disease on the
CT scan when compared to the CT scan performed shortly after the prior lung
scan, the CT of the chest dated 06/23/2016.
 
IMPRESSION:
Very low probability of pulmonary embolism.
 
SERVICE DATE: 07/09/
EXAM TYPE: US - US-EXT BILAT VENOUS DOPPLER
 
FINDINGS:
Respiratory variation, normal compression and augmented flow are noted
throughout the lower extremities. The visualized common femoral vein, greater
saphenous, superficial femoral vein, profunda femoral vein, popliteal vein
and midcalf peroneal and posterior tibial venous segments show no evidence of
deep venous thrombosis.
 
There is no Baker's cyst.
 
IMPRESSION:
No evidence of deep venous thrombosis involving the bilateral lower
extremities.
 
SERVICE DATE: 07/09/
EXAM TYPE: CAT - CT CHEST WO IV CONTRAST
 
FINDINGS:
 
LUNGS: Again seen are diffuse paraseptal and centrilobular emphysema, most
severe at the upper lobes. Peripheral interlobular septal thickening, and
honeycombing are evident at the lung bases and posterior/inferior aspects of
the upper lobes. The increased interstitial opacification in these regions of
fibrotic change could be due to atelectasis or superimposed interstitial
edema. No new areas of consolidation are identified. There is persistent
dense consolidation in the right lower lobe with surgical markers, not
significantly change from prior.
 
The spiculated, solid, anterior lingular nodule is unchanged in size from
prior, measuring 2.4 x 2.1 cm by my measurement. This corresponds to the
biopsied squamous cell carcinoma.
 
MEDIASTINUM: Mild cardiomegaly is again noted. Calcific atherosclerosis is
present in the coronary arteries. Thoracic aorta is normal in caliber.
Calcific atherosclerosis is present in the thoracic aorta. Thyroid gland is
unremarkable.
 
The 1 cm AP angle lymph node is unchanged. 1 cm (short axis) precarinal nodes
are again noted. Sensitivity for hilar adenopathy is limited without
intravenous contrast, though there is suspicion for hilar adenopathy on the
right.
 
PLEURA: No significant pleural effusion.
 
AXILLA: No lymphadenopathy.
 
UPPER ABDOMEN: Unremarkable.
 
OSSEOUS STRUCTURES: No suspicious bony lesions.
 
IMPRESSION:
Diffuse pulmonary emphysema with chronic interstitial pneumonitis, likely UIP
pattern. The density of the fibrotic interstitial changes increased from the
recent CT from 06/10/2018 which could be due to atelectasis, interstitial
edema, pneumonitis, or drug related phenomenon.
 
Cardiomegaly
 
Unchanged 2.4 cm squamous cell carcinoma in the lingula. Mild mediastinal
adenopathy is unchanged as well.
 
SERVICE DATE: 07/08/
EXAM TYPE: RAD - XRY-PORTABLE CHEST XRAY
 
FINDINGS:
Chronic centrilobular emphysema.
 
Nonspecific, diffuse interstitial opacity has improved/decreased compared to
06/12/2018. No focal airspace opacification, pleural effusion or
pneumothorax.
 
Cardiac silhouette is chronically enlarged; it has a stable appearance
compared to 12/20/2017. Atherosclerotic calcification of the aorta. Also,
dense calcification of the right coronary artery is seen. The hilar contours
are normal.
 
Bones are diffusely osteopenic.
 
IMPRESSION:
- Chronic obstructive pulmonary disease.
- Diffuse, nonspecific interstitial opacity in both lungs has
decreased/improved compared to 06/12/2018.
- Stable cardiomegaly.
 
 
Disposition Summary
 
Disposition
Principal Diagnosis:
acute on chronic respiratory failure
COPD Exacerbation
Hyperkalemia
Additional Diagnosis:
Squamous Cell Lung Carcinoma
Anemia
CKD
Paroxysmal atrial fibrillation
Hypertension
Anxiety
Discharge Disposition: SNF
 
Discharge Instructions
 
General Discharge Information
Code Status: Do Not Resucitate
Patient's Diet:
Regular
Patient's Activity:
As tolerated
Follow-Up Instructions/Appts:
Please follow up with your PCP and pulmonologist within one week of discharge. 
 
Please have a repeat CBC on 7/23/18 and further evaluate per MD discretion. 
 
Medications at Discharge
Discharge Medications:
Stop taking the following medications:
Amiodarone HCl (Pacerone) 100 MG TABLET ORAL Every other day 
 
Continue taking these medications:
Omeprazole (Omeprazole) 40 MG CAPSULE.DR
    1 Tablet ORAL DAILY
    Comments:
       Last Taken:7/20/18
             Time: 0534AM
 
Rosuvastatin Calcium (Crestor) 10 MG TABLET
    1 Tablet ORAL DAILY
    Comments:
       LIPITOR ADMINISTERED IN PLACE  
       Last Taken: 07/19/18
             Time: 1632
 
Aspirin (Children's Aspirin) 81 MG TAB.CHEW
    1 Tablet ORAL DAILY
    Comments:
       Last Taken: 7/20/18
             Time: 8:35 A.M
 
Alprazolam (Alprazolam) 0.5 MG TABLET
    1 Tablet ORAL TWICE DAILY
    Comments:
       Last Taken: 7/20/18
             Time: 1015AM
 
diphenhydrAMINE HCl (Benadryl) 25 MG CAPSULE
    1 Capsule ORAL TAKE AT BEDTIME as needed for SLEEP
    Comments:
       Last Taken: NOT GIVEN THIS THIS ADMISSION
             Time: 10 am
 
Metoprolol Succ XL (Toprol Xl) 50 MG TAB.ER.24H
    1 Tablet ORAL DAILY
    Comments:
       Last Taken: 7/20/18
             Time: 0835 AM
 
Albuterol Sulfate (Proair Respiclick) 90 MCG AER.POW.BA
    2 PUFF Inhale through mouth EVERY 8 HOURS AS NEEDED as needed for COPD
    Comments:
       Not given in hospital
 
Fluticasone/Vilanterol (Breo Ellipta 100-25 Mcg INH) 100 MCG-25 MCG/DOSE 
BLST.W.DEV
    1 PUFF Inhale through mouth DAILY
    Comments:
       NOT GIVEN/ PT ON SYMBICORT IN HOSPITAL
 
Umeclidinium Bromide (Incruse Ellipta) 62.5 MCG/ACTUATION BLST.W.DEV
    1 PUFF Inhale through mouth DAILY
    Comments:
       Not given in hospital
 
Ezetimibe (Zetia) 10 MG TABLET
    1 Tablet ORAL DAILY
    Comments:
       Last Taken: 7/20/18
             Time: 08:35 A.M
 
Epoetin Hermelindo (Epogen) 10,000 UNIT/ML VIAL
    34,000 Units SC EVERY 3 WEEKS
    Qty = 30
    Comments:
       Last Taken: 07/09/18
             Time: 2341
 
Furosemide (Furosemide) 20 MG TABLET
    20 Milligram ORAL Every other day
    Comments:
       Last Taken: 07/16/18 0840 AM
             ***PT DUE TO TAKE TODAY 7/20/18
 
Levothyroxine Sodium (Levothyroxine Sodium) 50 MCG TABLET
    1 Tablet ORAL DAILY
    Comments:
       Last Taken: 07/20/18
             Time: 0533
 
Ranolazine (Ranexa) 500 MG TAB.ER.12H
    1,000 Milligram ORAL TWICE DAILY
    Comments:
       Last Taken: 07/20/18
             Time: 0835 AM
 
Cholecalciferol (Vitamin D3) (Vitamin D3) 1,000 UNIT CAPSULE
    1 Capsule ORAL DAILY
    Comments:
       NOT GIVEN IN HOSPITAL
 
Mirtazapine (Mirtazapine) 15 MG TABLET
    1 Tablet ORAL TAKE AT BEDTIME
    Comments:
       Last Taken: 07/19/20
             Time: 2205
 
Albuterol Sulfate (Albuterol Sulfate) 2.5 MG/3 ML (0.083 %) VIAL.NEB
    1 Vial Inhale Solution TWICE DAILY
    Comments:
       Last Taken: 07/20/18
             Time:1022 AM
 
Sennosides/Docusate Sodium (Senokot-S Tablet) 8.6 MG-50 MG TABLET
    1 Tablet ORAL DAILY as needed for CONSTIPATION 
    Comments:
       Last Taken: 07/10/18
             Time: 2005
 
Start taking the following new medications:
Prednisone (Prednisone) 20 MG TABLET
    1 Tablet ORAL DAILY
    Qty = 30
    No Refills
 
 
Copies To:
Jaciel Mari MD
 
 
 
Copies To:
Jaciel Mari MD

## 2021-06-23 NOTE — PN- STUDENT
Subjective
Subjective:
No acute events overnight. Patient feels well and says she is ready to go home.
 
Objective
Objective:
 Vital Signs
 
 
Date Time Temp Pulse Resp B/P B/P Pulse O2 O2 Flow FiO2
 
     Mean Ox Delivery Rate 
 
06/13 0824      95 Nasal 4.0L 
 
       Cannula  
 
06/13 0647 97.9 85 20 108/50  99 Nasal  
 
       Cannula  
 
06/12 2127 97.1 95 20 108/92  93 Nasal  
 
       Cannula  
 
06/12 2037      95 Nasal 4.0L 
 
       Cannula  
 
06/12 2019  95  108/52     
 
06/12 1835      95 Nasal 4.0L 
 
       Cannula  
 
06/12 1646  90  116/52     
 
06/12 1600      95 Nasal 5.0L 
 
       Cannula  
 
06/12 1423 97.7 91 20 116/52  95 Nasal  
 
       Cannula  
 
06/12 0906 98.1 97 20 156/62     
 
06/12 0905 98.1 88 20 156/62     
 
 
 
 ED Intake and Output
 
 
 06/13 0000 06/12 1200
 
Intake Total 842.5 50
 
Output Total 750 
 
Balance 92.5 50
 
   
 
Intake, IV 22.5 
 
Intake, Oral 820 50
 
Output, Urine 750 
 
Patient 130 lb 
 
Weight  
 
 
 
 Laboratory Tests
 
 
 06/13 0620
 
Hematology 
 
  CBC w Diff Pending
 
  WBC Pending
 
  RBC Pending
 
  Hgb Pending
 
  Hct Pending
 
  MCV Pending
 
  MCH Pending
 
  MCHC Pending
 
  RDW Pending
 
  Plt Count Pending
 
  MPV Pending
 
 
 
PE: 
Patient is an anxious appearing lady, alert and oriented x3. S1 & S2 were heard,
with no additional murmurs. On lung auscultation, breath sounds were diminished 
and crackels were noted. She had normal bowel sounds, and her abdomen was soft, 
nontender, with no guarding.
 
Results
Results:
Laboratory Tests
 
06/13/18 0620:
CBC w Diff Pending, WBC Pending, RBC Pending, Hgb Pending, Hct Pending, MCV 
Pending, MCH Pending, MCHC Pending, RDW Pending, Plt Count Pending, MPV Pending
 
06/12/18 0639:
Anion Gap 13, Estimated GFR 22  L, BUN/Creatinine Ratio 18.2, CBC w Diff NO MAN 
DIFF REQ, RBC 3.11  L, MCV 81.9, MCH 26.4  L, MCHC 32.2  L, RDW 17.8  H, MPV 8.9
, Gran % 93.6  H, Lymphocytes % 5.9  L, Monocytes % 0.4  L, Eosinophils % 0.1, 
Basophils % 0, Absolute Granulocytes 9.0  H, Absolute Lymphocytes 0.6  L, 
Absolute Monocytes 0  L, Absolute Eosinophils 0, Absolute Basophils 0
 
06/11/18 1600:
CBC w Diff NO MAN DIFF REQ, RBC 3.15  L, MCV 81.3, MCH 26.1  L, MCHC 32.2  L, 
RDW 18.0  H, MPV 8.8, Gran % 71.1, Lymphocytes % 16.9  L, Monocytes % 8.9, 
Eosinophils % 3.1, Basophils % 0, Absolute Granulocytes 5.6, Absolute 
Lymphocytes 1.3, Absolute Monocytes 0.7  H, Absolute Eosinophils 0.2, Absolute 
Basophils 0
 
06/11/18 0654:
Anion Gap 14, Estimated GFR 25  L, BUN/Creatinine Ratio 15.5, Troponin I 0.01, 
CBC w Diff NO MAN DIFF REQ, RBC 2.65  L, MCV 80.9  L, MCH 25.8  L, MCHC 31.9  L,
RDW 19.7  H, MPV 8.9, Gran % 74.9, Lymphocytes % 9.8  L, Monocytes % 12.0  H, 
Eosinophils % 3.2, Basophils % 0.1, Absolute Granulocytes 6.1, Absolute 
Lymphocytes 0.8  L, Absolute Monocytes 1.0  H, Absolute Eosinophils 0.3, 
Absolute Basophils 0
 
06/10/18 1955:
Troponin I < 0.01
 
06/10/18 1615:
Lactic Acid Cancelled
 
06/10/18 1445:
Urine Color YEL, Urine Clarity CLEAR, Urine pH 7.0, Ur Specific Gravity 1.010, 
Urine Protein NEG, Urine Ketones NEG, Urine Nitrite NEG, Urine Bilirubin NEG, 
Urine Urobilinogen 0.2, Ur Leukocyte Esterase NEG, Ur Microscopic EXAM NOT 
REQUIRED, Urine Hemoglobin NEG, Urine Glucose NEG
 
06/10/18 1340:
Anion Gap 14, Estimated GFR 26  L, BUN/Creatinine Ratio 13.7, Glucose 101  H, 
Lactic Acid 1.7, Calcium 9.6, Magnesium 1.8, Total Bilirubin 0.5, AST 36, ALT 23
, Alkaline Phosphatase 143  H, Troponin I < 0.01, Pro-B-Natriuretic Pept 4570  H
, Total Protein 7.2, Albumin 4.0, Globulin 3.2, Albumin/Globulin Ratio 1.3, PT 
12.0, INR 1.10, APTT 29, CBC w Diff MAN DIFF ORDERED, RBC 3.08  L, MCV 81.7, MCH
25.6  L, MCHC 31.3  L, RDW 19.7  H, MPV 8.6, Segmented Neutrophils 87  H, 
Lymphocytes 2  L, Monocytes 9, Eosinophils 2, Nucleated RBCs 1  H, Polychromasia
1+, Hypochromic-Microcytic 2+, Poikilocytosis 1+, Anisocytosis 2+
 Microbiology
06/10 1848  NASOPHARYN: Influenza Virus A & B Rapid Smear - CAN
                Cancelled: SPECIMEN NOT RECEIVED IN LABORATORY
 
IMAGING:
Venous doppler US:
IMPRESSION:
No deep venous thrombosis demonstrated.
 
Echocardiogram US:
CONCLUSIONS 
 Normal left and right ventricular systolic function. No significant  
 valvular abnormalities noted. 
 
 
CXR:
IMPRESSION:
No evidence of complications status post left lung biopsy.
 
Chronic lung changes.
 
 
Assessment/Plan
Assessment:
Ms. Landers is a 69 y/o female with a PMH of CAD s/p PCI (2001, 2014), STEMI 
on RCA (2016) & paroxysmal afib (not on anticoagulation due to hx of GI bleed) (
followed by Dr. Murray), COPD, ILD & squamous cell lung cancer (followed by Dr. Corcoran), chronic anemia and CKD (followed by Dr. Chacon), that was BIBA d/t 
progressive SOB (saturating on the 70's even with 3L of home oxygen). She felt a
pressure-like chest pain that would radiate to her lower jaw bilaterally. Before
arriving to the hospital, she felt like she was going to faint, so she decided 
to activate her life alert. On arrival, her BP was 180/70, O2 sat was 86% on 5L 
nasal cannula, Hg was 7.9 and K+ was 5.7 (for which she was given Kayexalate in 
the ED). Patient had a growing mass in the lingula, for which a successful lung 
biopsy was performed yesterday.
 
Problem list and plan:
* SOB: Patient has an extensive history of lung and heart diseases that could 
explain her SOB. The pressure-like chest pain radiating to the jaw points to a 
cardiac etiology, but troponins have been consistently negative. Also, the pain 
has dissipated since then. The most likely cause of her SOB seems to be an acute
respiratory failure, d/t exacerbation of her COPD. Patient has ILD, which could 
get worse with amiodarone therapy. Consult with pulmonology and cardiology to 
know their opinion on this. 
 Patient was started on  IV Solumedrol currently 40mg q 12h, per pulmonologist's
 
 recommendation and her condition has significantly improved. Patient is 
currently on her 
 baseline O2 requirements. Next course of action is to start tapering the 
solumedrol in 
 anticipation for discharge.
 
* Chest pain: Resolved. Troponins negative x2. Cardiology is on the case.
 
* Chronic anemia: Patient has CKD and is in epogen q 3 wks. Blood transfusion 
was administered, which increased her Hgb, but it has come down again today a 
little (from 8.2 to 7.8). The marked anemia could also be contributing to the 
patient's SOB. Transfuse as needed, taking into consideration her volume status.
Follow up with PCP.
 
* Hyperkalemia: Resolved.
 
* Growing mass in lingula: Patient has a lung mass whose progression is being 
followed by Dr. Corcoran. CXR and CT showed that the mass has increased in size. 
CT guided lung biopsy was performed. Awaiting the path report. Follow up with 
PCP and Dr. Corcoran.
 
* Anxiety: Patient seems very anxious all the time. She is taking Alprazolam but
it doesn't seem like is being very effective. Continue with Alprazolam and 
consider increasing the dose. Patient's anxiety could be worse due to her 
recently found mass. Anxiety can also be contributing to her SOB. If anxiety 
persists, consider psychiatry referral as outpatient.
 
* Other chronic conditions: Continue home meds.
Subjective
Subjective:
Patient wasn't able to sleep last night. She is complaining of a headache which 
she rates a 10/10. She also complains of lightheadedness, especially when 
changing positions. When I arrived in the room, she didn't have her O2 on, her 
saturation was checked and it was 77, so she was put on high flow oxygen right 
away, even though she didn't really wanted to have it on. 
 
Objective
Objective:
 Vital Signs
 
 
Date Time Temp Pulse Resp B/P B/P Pulse O2 O2 Flow FiO2
 
     Mean Ox Delivery Rate 
 
06/11 0702 98.4 68 18 116/66  99 Non  
 
       ReBreather  
 
06/11 0112      92 Nasal 55% 
 
       Cannula  
 
06/11 0000       Nasal 55% 
 
       Cannula  
 
06/10 2230 99.9 78 18 104/58  92 Part  
 
       ReBreather  
 
06/10 2155  81  134/64     
 
06/10 2154  81  134/64     
 
06/10 2130       Nasal 5.0L 
 
       Cannula  
 
06/10 1829 98.3 81 18 134/64  92 Nasal 5.0L 
 
       Cannula  
 
06/10 1810       Nasal 3.0L 
 
       Cannula  
 
06/10 1735 97.4  28      
 
06/10 1731  88 24 140/68  92 Nasal 5.0L 
 
       Cannula  
 
06/10 1424      91 Nasal 5.0L 
 
       Cannula  
 
06/10 1336      86 Nasal 5.0L 
 
       Cannula  
 
06/10 1308 97.9 75 22 180/70  86 Nasal 5.0L 
 
       Cannula  
 
 
 ED Intake and Output
 
 
 06/11 0000 06/10 1200
 
Intake Total 300 
 
Output Total 700 
 
Balance -400 
 
   
 
Intake, Oral 300 
 
Output, Urine 700 
 
Patient 124 lb 
 
Weight  
 
Weight Bed scale 
 
Measurement  
 
Method  
 
 
 Laboratory Tests
 
 
 06/11 06/10 06/10
 
 0654 1955 1615
 
Chemistry   
 
  Sodium (137 - 145 mmol/L) 141  
 
  Potassium (3.5 - 5.1 mmol/L) 4.4  
 
  Chloride (98 - 107 mmol/L) 104  
 
  Carbon Dioxide (22 - 30 mmol/L) 23  
 
  Anion Gap (5 - 16) 14  
 
  BUN (7 - 17 mg/dL) 31  H  
 
  Creatinine (0.5 - 1.0 mg/dL) 2.0  H  
 
  Estimated GFR (>60 ml/min) 25  L  
 
  BUN/Creatinine Ratio (7 - 25 %) 15.5  
 
  Lactic Acid   Cancelled
 
  Troponin I (< 0.11 ng/ml) 0.01 < 0.01 
 
Hematology   
 
  CBC w Diff NO MAN DIFF REQ  
 
  WBC (4.8 - 10.8 /CUMM) 8.2  
 
  RBC (4.20 - 5.40 /CUMM) 2.65  L  
 
  Hgb (12.0 - 16.0 G/DL) 6.9 *L  
 
  Hct (37 - 47 %) 21.5  L  
 
  MCV (81.0 - 99.0 FL) 80.9  L  
 
  MCH (27.0 - 31.0 PG) 25.8  L  
 
  MCHC (33.0 - 37.0 G/DL) 31.9  L  
 
  RDW (11.5 - 14.5 %) 19.7  H  
 
  Plt Count (130 - 400 /CUMM) 315  
 
  MPV (7.4 - 10.4 FL) 8.9  
 
  Gran % (42.2 - 75.2 %) 74.9  
 
  Lymphocytes % (20.5 - 51.1 %) 9.8  L  
 
  Monocytes % (1.7 - 9.3 %) 12.0  H  
 
  Eosinophils % (0 - 5 %) 3.2  
 
  Basophils % (0.0 - 2.0 %) 0.1  
 
  Absolute Granulocytes (1.4 - 6.5 /CUMM) 6.1  
 
  Absolute Lymphocytes (1.2 - 3.4 /CUMM) 0.8  L  
 
  Absolute Monocytes (0.10 - 0.60 /CUMM) 1.0  H  
 
  Absolute Eosinophils (0.0 - 0.7 /CUMM) 0.3  
 
  Absolute Basophils (0.0 - 0.2 /CUMM) 0  
 
 
 
 
 06/10 06/10
 
 1445 1340
 
Chemistry  
 
  Sodium (137 - 145 mmol/L)  143
 
  Potassium (3.5 - 5.1 mmol/L)  5.7  H
 
  Chloride (98 - 107 mmol/L)  107
 
  Carbon Dioxide (22 - 30 mmol/L)  22
 
  Anion Gap (5 - 16)  14
 
  BUN (7 - 17 mg/dL)  26  H
 
  Creatinine (0.5 - 1.0 mg/dL)  1.9  H
 
  Estimated GFR (>60 ml/min)  26  L
 
  BUN/Creatinine Ratio (7 - 25 %)  13.7
 
  Glucose (65 - 99 mg/dL)  101  H
 
  Lactic Acid (0.7 - 2.1 mmol/L)  1.7
 
  Calcium (8.4 - 10.2 mg/dL)  9.6
 
  Magnesium (1.6 - 2.3 mg/dL)  1.8
 
  Total Bilirubin (0.2 - 1.3 mg/dL)  0.5
 
  AST (14 - 36 U/L)  36
 
  ALT (9 - 52 U/L)  23
 
  Alkaline Phosphatase (<127 U/L)  143  H
 
  Troponin I (< 0.11 ng/ml)  < 0.01
 
  Pro-B-Natriuretic Pept (<125 pg/mL)  4570  H
 
  Total Protein (6.3 - 8.2 g/dL)  7.2
 
  Albumin (3.5 - 5.0 g/dL)  4.0
 
  Globulin (1.9 - 4.2 gm/dL)  3.2
 
  Albumin/Globulin Ratio (1.1 - 2.2 %)  1.3
 
Coagulation  
 
  PT (9.4 - 12.5 SEC)  12.0
 
  INR (0.90 - 1.19)  1.10
 
  APTT (25 - 37 SEC)  29
 
Hematology  
 
  CBC w Diff  MAN DIFF ORDERED
 
  WBC (4.8 - 10.8 /CUMM)  11.2  H
 
  RBC (4.20 - 5.40 /CUMM)  3.08  L
 
  Hgb (12.0 - 16.0 G/DL)  7.9  L
 
  Hct (37 - 47 %)  25.1  L
 
  MCV (81.0 - 99.0 FL)  81.7
 
  MCH (27.0 - 31.0 PG)  25.6  L
 
  MCHC (33.0 - 37.0 G/DL)  31.3  L
 
  RDW (11.5 - 14.5 %)  19.7  H
 
  Plt Count (130 - 400 /CUMM)  353
 
  MPV (7.4 - 10.4 FL)  8.6
 
  Segmented Neutrophils (42.2 - 75.2 %)  87  H
 
  Lymphocytes (20.5 - 51.1 %)  2  L
 
  Monocytes (1.7 - 9.3 %)  9
 
  Eosinophils (0 - 5.0 %)  2
 
  Nucleated RBCs (0.0 - 0.0 /100WBC)  1  H
 
  Polychromasia  1+
 
  Hypochromic-Microcytic  2+
 
  Poikilocytosis  1+
 
  Anisocytosis  2+
 
Urines  
 
  Urine Color (YEL,AMB,STR) YEL 
 
  Urine Clarity (CLEAR) CLEAR 
 
  Urine pH (5.0 - 8.0) 7.0 
 
  Ur Specific Gravity (1.001 - 1.035) 1.010 
 
  Urine Protein (NEG,<30 MG/DL) NEG 
 
  Urine Ketones (NEG) NEG 
 
  Urine Nitrite (NEG) NEG 
 
  Urine Bilirubin (NEG) NEG 
 
  Urine Urobilinogen (0.1  -  1.0 EU/dl) 0.2 
 
  Ur Leukocyte Esterase (NEG) NEG 
 
  Ur Microscopic EXAM NOT REQUIRED 
 
  Urine Hemoglobin (NEG) NEG 
 
  Urine Glucose (N MG/DL) NEG 
 
 
 
PE: 
Patient is an anxious appearing lady, alert and oriented x3. S1 & S2 were heard,
with no additional murmurs. On lung auscultation, breath sounds were diminished 
and crackels were noted. She had normal bowel sounds, and her abdomen was soft, 
nontender, with no guarding.
 
Results
Results:
Laboratory Tests
 
06/11/18 0654:
Anion Gap 14, Estimated GFR 25  L, BUN/Creatinine Ratio 15.5, Troponin I 0.01, 
CBC w Diff NO MAN DIFF REQ, RBC 2.65  L, MCV 80.9  L, MCH 25.8  L, MCHC 31.9  L,
RDW 19.7  H, MPV 8.9, Gran % 74.9, Lymphocytes % 9.8  L, Monocytes % 12.0  H, 
Eosinophils % 3.2, Basophils % 0.1, Absolute Granulocytes 6.1, Absolute 
Lymphocytes 0.8  L, Absolute Monocytes 1.0  H, Absolute Eosinophils 0.3, 
Absolute Basophils 0
 
06/10/18 1955:
Troponin I < 0.01
 
06/10/18 1615:
Lactic Acid Cancelled
 
06/10/18 1445:
Urine Color YEL, Urine Clarity CLEAR, Urine pH 7.0, Ur Specific Gravity 1.010, 
Urine Protein NEG, Urine Ketones NEG, Urine Nitrite NEG, Urine Bilirubin NEG, 
Urine Urobilinogen 0.2, Ur Leukocyte Esterase NEG, Ur Microscopic EXAM NOT 
REQUIRED, Urine Hemoglobin NEG, Urine Glucose NEG
 
06/10/18 1340:
Anion Gap 14, Estimated GFR 26  L, BUN/Creatinine Ratio 13.7, Glucose 101  H, 
Lactic Acid 1.7, Calcium 9.6, Magnesium 1.8, Total Bilirubin 0.5, AST 36, ALT 23
, Alkaline Phosphatase 143  H, Troponin I < 0.01, Pro-B-Natriuretic Pept 4570  H
, Total Protein 7.2, Albumin 4.0, Globulin 3.2, Albumin/Globulin Ratio 1.3, PT 
12.0, INR 1.10, APTT 29, CBC w Diff MAN DIFF ORDERED, RBC 3.08  L, MCV 81.7, MCH
25.6  L, MCHC 31.3  L, RDW 19.7  H, MPV 8.6, Segmented Neutrophils 87  H, 
Lymphocytes 2  L, Monocytes 9, Eosinophils 2, Nucleated RBCs 1  H, Polychromasia
1+, Hypochromic-Microcytic 2+, Poikilocytosis 1+, Anisocytosis 2+
 Microbiology
06/10 1848  NASOPHARYN: Influenza Virus A & B Rapid Smear - COLB
 
Imaging:
CXR: 
IMPRESSION:
Bibasilar airspace opacities, right greater than left with possible small
bilateral pleural effusions. These opacities are nonspecific and may
represent atelectasis or pneumonia.
 
Mild engorgement of the pulmonary vasculature suggesting volume overload.
 
Prominent airspace opacities in the right middle and right upper lobe. These
may represent sequela of mild volume overload or possibly developing foci of
multifocal pneumonia.
 
Chest CT:
IMPRESSION:
Diffuse centrilobular and paraseptal emphysema with some chronic interstitial
lung disease. Interval increase in interstitial disease which may be related
to interstitial edema, atypical or viral pneumonitis, or be drug-related.
 
Interval enlargement of lingular mass.
 
 
 
Assessment/Plan
Assessment:
Ms. Landers is a 69 y/o female with a PMH of CAD s/p PCI (2001, 2014), STEMI 
on RCA (2016) & paroxysmal afib (not on anticoagulation due to hx of GI bleed) (
followed by Dr. Murray), COPD, ILD & squamous cell lung cancer (followed by Dr. Corcoran), chronic anemia and CKD (followed by Dr. Chacon), that was BIBA d/t 
progressive SOB (saturating on the 70's even with 3L of home oxygen). She felt a
pressure-like chest pain that would radiate to her lower jaw bilaterally. Before
arriving to the hospital, she felt like she was going to faint, so she decided 
to activate her life alert. On arrival, her BP was 180/70, O2 sat was 86% on 5L 
nasal cannula, Hg was 7.9 and K+ was 5.7 (for which she was given Kayexalate in 
the ED).
 
Problem list and plan:
* SOB: Patient has an extensive history of lung and heart diseases that could 
explain her SOB. The pressure-like chest pain radiating to the jaw points to a 
cardiac etiology, but troponins have been consistently negative. Also, the pain 
has dissipated since then. The more likely cause of her SOB seems to be an acute
respiratory failure, d/t exacerbation of her COPD. Patient has ILD, which could 
get worse with amiodarone therapy. Consult with pulmonology and cardiology to 
know their opinion on this. Patient is currently on high flow oxygen, which 
should be continued. Pulmonology consultation was placed, awaiting for their 
recommendations.
 
* Chest pain: Resolved. Troponins negative x2. Cardiology consultation was 
placed. Awaiting for their input.
 
* Chronic anemia: Patient has CKD and is in epogen q 3 wks. Hgb is 6.9 this 
morning, and a blood transfusion consent was taken this morning. The marked 
anemia could also be contributing to the patient's SOB.
 
* Hyperkalemia: Resolved.
 
* Growing mass in lingula: Patient has a lung mass whose progression is being 
followed by Dr. Corcoran. CXR and CT showed that the mass has increased in size. 
She is scheduled to have a CT guided lung biopsy tomorrow, but the current 
status is unclear as patient oxygen requirements have increased and her anemia 
has worsened. IR should be contacted to assess the requirements and any other 
measurements (if any) that need to be followed. Patient seems anxious about the 
procedure and gets teary when contemplating that it may not be possible to 
proceed with it tomorrow.
 
* Anxiety: Patient seems very anxious all the time. She is taking Alprazolam but
it doesn't seem like is being very effective. Continue with Alprazolam and 
consider increasing the dose. Patient's anxiety could be worse due to her 
recently found mass or because of her lung biopsy tomorrow. Anxiety can also be 
contributing to her SOB. If anxiety persists, consider psychiatry referral as 
outpatient.
 
* Other chronic conditions: Continue home meds.
Statement Selected

## 2024-05-29 NOTE — RADIOLOGY REPORT
"Pt arrives for chest pain starting around 1600 when sitting, pain radiates to L neck. Hx stent placement. Was seen last Wednesday for similar thing but was \"fine\", sent home with meds for ulcer/reflux. Took baby aspirin PTA. Denies nausea/SOB/dizziness. Slight hypertension.        " EXAMINATION:
XR PORTABLE CHEST
 
CLINICAL INFORMATION:
Presumptive diagnosis of COPD with fluid overload. Crackles bilaterally.
 
COMPARISON:
Several prior chest x-rays, most recent of which is dated 07/08/2018. CT scan
of the chest dated 07/09/2018. VQ scan dated 07/10/2018.
 
TECHNIQUE:
Portable semierect view of the chest was obtained.
 
FINDINGS:
EKG leads are seen overlying the chest. The cardiomediastinal silhouette is
enlarged. Calcification of the aorta is seen.
 
Lungs are hyperinflated, consistent with patient's known history of COPD.
Superimposed reticular opacities are seen in the mid and lower lungs,
consistent with the CT demonstrated fibrotic changes, perhaps related to RB
ILD. The patient's known large lingular cancer is poorly appreciated on this
portable film in the medial left infrahilar region. No dense consolidation is
seen. There is some indistinctness of the CP angles, likely representing
atelectatic changes though trace pleural effusions could have a similar
appearance. No evidence of pulmonary edema is seen. No pneumothorax is seen.
 
Osteopenia is noted with mild convex right thoracolumbar scoliosis.
 
IMPRESSION:
 
1. Extensive obstructive lung disease with fibrotic changes in the mid and
lower lung bilaterally. No superimposed focal acute process is seen.
Specifically, no evidence of pulmonary edema is seen.
2. Slight indistinctness of the CP angles is noted, likely due to atelectatic
change, though trace pleural effusions cannot be completely excluded.